# Patient Record
Sex: FEMALE | Race: BLACK OR AFRICAN AMERICAN | NOT HISPANIC OR LATINO | Employment: UNEMPLOYED | URBAN - METROPOLITAN AREA
[De-identification: names, ages, dates, MRNs, and addresses within clinical notes are randomized per-mention and may not be internally consistent; named-entity substitution may affect disease eponyms.]

---

## 2017-04-27 RX ORDER — NORETHINDRONE ACETATE AND ETHINYL ESTRADIOL 1MG-20(21)
KIT ORAL
Qty: 28 TABLET | Refills: 0 | Status: SHIPPED | OUTPATIENT
Start: 2017-04-27 | End: 2022-03-23

## 2017-09-09 ENCOUNTER — OFFICE VISIT (OUTPATIENT)
Dept: URGENT CARE | Facility: CLINIC | Age: 27
End: 2017-09-09
Payer: MEDICAID

## 2017-09-09 VITALS
TEMPERATURE: 97 F | HEIGHT: 65 IN | HEART RATE: 69 BPM | BODY MASS INDEX: 43.15 KG/M2 | WEIGHT: 259 LBS | SYSTOLIC BLOOD PRESSURE: 142 MMHG | RESPIRATION RATE: 18 BRPM | OXYGEN SATURATION: 98 % | DIASTOLIC BLOOD PRESSURE: 88 MMHG

## 2017-09-09 DIAGNOSIS — J30.9 ACUTE ALLERGIC RHINITIS, UNSPECIFIED SEASONALITY, UNSPECIFIED TRIGGER: Primary | ICD-10-CM

## 2017-09-09 PROCEDURE — 3008F BODY MASS INDEX DOCD: CPT | Mod: S$GLB,,, | Performed by: FAMILY MEDICINE

## 2017-09-09 PROCEDURE — 99203 OFFICE O/P NEW LOW 30 MIN: CPT | Mod: S$GLB,,, | Performed by: FAMILY MEDICINE

## 2017-09-09 RX ORDER — FLUTICASONE PROPIONATE 50 MCG
1 SPRAY, SUSPENSION (ML) NASAL DAILY
Qty: 1 BOTTLE | Refills: 2 | Status: SHIPPED | OUTPATIENT
Start: 2017-09-09 | End: 2018-09-09

## 2017-09-09 RX ORDER — LORATADINE 10 MG/1
10 TABLET ORAL DAILY
Refills: 0 | COMMUNITY
Start: 2017-09-09 | End: 2022-03-23

## 2017-09-09 NOTE — PROGRESS NOTES
"Subjective:       Patient ID: Pinky Kate is a 27 y.o. female.    Vitals:  height is 5' 5" (1.651 m) and weight is 117.5 kg (259 lb). Her oral temperature is 97.4 °F (36.3 °C). Her blood pressure is 142/88 (abnormal) and her pulse is 69. Her respiration is 18 and oxygen saturation is 98%.     Chief Complaint: Sinusitis    Sinusitis   This is a new problem. The current episode started more than 1 month ago. The problem has been gradually worsening since onset. There has been no fever. Her pain is at a severity of 4/10. The pain is mild. Associated symptoms include congestion, ear pain, headaches and a hoarse voice. Pertinent negatives include no chills, coughing, shortness of breath or sore throat. Treatments tried: ibuprofen  The treatment provided mild relief.     Review of Systems   Constitution: Negative for chills, fever and malaise/fatigue.   HENT: Positive for congestion, ear pain and hoarse voice. Negative for sore throat.    Eyes: Positive for blurred vision. Negative for discharge and redness.   Cardiovascular: Negative for chest pain, dyspnea on exertion and leg swelling.   Respiratory: Negative for cough, shortness of breath, sputum production and wheezing.    Musculoskeletal: Negative for myalgias.   Gastrointestinal: Positive for nausea. Negative for abdominal pain.   Neurological: Positive for headaches.       Objective:      Physical Exam   Constitutional: She is oriented to person, place, and time. She appears well-developed and well-nourished.   HENT:   Head: Normocephalic and atraumatic.   Nose: Mucosal edema, rhinorrhea and sinus tenderness (minimal over right ethomidal) present.   Mouth/Throat: Posterior oropharyngeal edema and posterior oropharyngeal erythema present.   Eyes: Conjunctivae and EOM are normal. Pupils are equal, round, and reactive to light.   Neck: Normal range of motion.   Cardiovascular: Normal rate, regular rhythm and normal heart sounds.    Pulmonary/Chest: Effort normal and " breath sounds normal. She has no wheezes. She has no rales.   Neurological: She is alert and oriented to person, place, and time.       Assessment:       1. Acute allergic rhinitis, unspecified seasonality, unspecified trigger        Plan:         Acute allergic rhinitis, unspecified seasonality, unspecified trigger  -     loratadine (CLARITIN) 10 mg tablet; Take 1 tablet (10 mg total) by mouth once daily.; Refill: 0  -     fluticasone (FLONASE) 50 mcg/actuation nasal spray; 1 spray by Each Nare route once daily.  Dispense: 1 Bottle; Refill: 2

## 2017-09-09 NOTE — PATIENT INSTRUCTIONS

## 2017-12-03 ENCOUNTER — OFFICE VISIT (OUTPATIENT)
Dept: URGENT CARE | Facility: CLINIC | Age: 27
End: 2017-12-03
Payer: MEDICAID

## 2017-12-03 VITALS
WEIGHT: 259 LBS | HEART RATE: 94 BPM | TEMPERATURE: 98 F | BODY MASS INDEX: 43.15 KG/M2 | OXYGEN SATURATION: 98 % | HEIGHT: 65 IN | DIASTOLIC BLOOD PRESSURE: 93 MMHG | SYSTOLIC BLOOD PRESSURE: 139 MMHG

## 2017-12-03 DIAGNOSIS — J06.9 VIRAL UPPER RESPIRATORY TRACT INFECTION: Primary | ICD-10-CM

## 2017-12-03 DIAGNOSIS — R06.02 SHORTNESS OF BREATH: ICD-10-CM

## 2017-12-03 DIAGNOSIS — R52 BODY ACHES: ICD-10-CM

## 2017-12-03 LAB
CTP QC/QA: YES
FLUAV AG NPH QL: NEGATIVE
FLUBV AG NPH QL: NEGATIVE

## 2017-12-03 PROCEDURE — 99214 OFFICE O/P EST MOD 30 MIN: CPT | Mod: 25,S$GLB,, | Performed by: NURSE PRACTITIONER

## 2017-12-03 PROCEDURE — 94640 AIRWAY INHALATION TREATMENT: CPT | Mod: S$GLB,,, | Performed by: EMERGENCY MEDICINE

## 2017-12-03 PROCEDURE — 87804 INFLUENZA ASSAY W/OPTIC: CPT | Mod: 59,QW,S$GLB, | Performed by: NURSE PRACTITIONER

## 2017-12-03 RX ORDER — PROMETHAZINE HYDROCHLORIDE AND DEXTROMETHORPHAN HYDROBROMIDE 6.25; 15 MG/5ML; MG/5ML
5 SYRUP ORAL
Qty: 118 ML | Refills: 0 | Status: SHIPPED | OUTPATIENT
Start: 2017-12-03 | End: 2022-03-23

## 2017-12-03 RX ORDER — ALBUTEROL SULFATE 0.83 MG/ML
2.5 SOLUTION RESPIRATORY (INHALATION)
Status: COMPLETED | OUTPATIENT
Start: 2017-12-03 | End: 2017-12-03

## 2017-12-03 RX ORDER — ALBUTEROL SULFATE 90 UG/1
2 AEROSOL, METERED RESPIRATORY (INHALATION) EVERY 6 HOURS PRN
Qty: 18 G | Refills: 0 | Status: SHIPPED | OUTPATIENT
Start: 2017-12-03 | End: 2022-03-23

## 2017-12-03 RX ORDER — DEXAMETHASONE SODIUM PHOSPHATE 100 MG/10ML
10 INJECTION INTRAMUSCULAR; INTRAVENOUS
Status: COMPLETED | OUTPATIENT
Start: 2017-12-03 | End: 2017-12-03

## 2017-12-03 RX ADMIN — ALBUTEROL SULFATE 2.5 MG: 0.83 SOLUTION RESPIRATORY (INHALATION) at 01:12

## 2017-12-03 RX ADMIN — DEXAMETHASONE SODIUM PHOSPHATE 10 MG: 100 INJECTION INTRAMUSCULAR; INTRAVENOUS at 01:12

## 2017-12-03 NOTE — PROGRESS NOTES
"Subjective:       Patient ID: Pinky Kate is a 27 y.o. female.    Vitals:  height is 5' 5" (1.651 m) and weight is 117.5 kg (259 lb). Her oral temperature is 98.3 °F (36.8 °C). Her blood pressure is 139/93 (abnormal) and her pulse is 94. Her oxygen saturation is 98%.     Chief Complaint: Generalized Body Aches and Fever    Symptoms since friday      Fever    This is a new problem. The current episode started in the past 7 days. The problem occurs constantly. The problem has been unchanged. Her temperature was unmeasured prior to arrival. Associated symptoms include congestion, coughing, diarrhea, nausea, a sore throat and vomiting. Pertinent negatives include no abdominal pain, chest pain, ear pain, headaches or wheezing.     Review of Systems   Constitution: Positive for chills, fever and malaise/fatigue.   HENT: Positive for congestion and sore throat. Negative for ear pain and hoarse voice.    Eyes: Negative for discharge and redness.   Cardiovascular: Negative for chest pain, dyspnea on exertion and leg swelling.   Respiratory: Positive for cough and sputum production. Negative for shortness of breath and wheezing.    Musculoskeletal: Positive for myalgias.   Gastrointestinal: Positive for diarrhea, nausea and vomiting. Negative for abdominal pain.   Neurological: Negative for headaches.       Objective:      Physical Exam   Constitutional: She is oriented to person, place, and time. She appears well-developed and well-nourished. She is cooperative.  Non-toxic appearance. She does not appear ill. No distress.   HENT:   Head: Normocephalic and atraumatic.   Right Ear: Hearing, tympanic membrane, external ear and ear canal normal.   Left Ear: Hearing, tympanic membrane, external ear and ear canal normal.   Nose: Nose normal. No mucosal edema, rhinorrhea or nasal deformity. No epistaxis. Right sinus exhibits no maxillary sinus tenderness and no frontal sinus tenderness. Left sinus exhibits no maxillary sinus " tenderness and no frontal sinus tenderness.   Mouth/Throat: Uvula is midline, oropharynx is clear and moist and mucous membranes are normal. No trismus in the jaw. Normal dentition. No uvula swelling. No posterior oropharyngeal erythema.   Eyes: Conjunctivae and lids are normal. No scleral icterus.   Sclera clear bilat   Neck: Trachea normal, full passive range of motion without pain and phonation normal. Neck supple.   Cardiovascular: Normal rate, regular rhythm, normal heart sounds, intact distal pulses and normal pulses.    Pulmonary/Chest: Effort normal and breath sounds normal. No respiratory distress.   Abdominal: Soft. Normal appearance and bowel sounds are normal. She exhibits no distension. There is no tenderness.   Musculoskeletal: Normal range of motion. She exhibits no edema or deformity.   Neurological: She is alert and oriented to person, place, and time. She exhibits normal muscle tone. Coordination normal.   Skin: Skin is warm, dry and intact. She is not diaphoretic. No pallor.   Psychiatric: She has a normal mood and affect. Her speech is normal and behavior is normal. Judgment and thought content normal. Cognition and memory are normal.   Nursing note and vitals reviewed.      Assessment:       1. Viral upper respiratory tract infection    2. Body aches    3. Shortness of breath        Plan:         Viral upper respiratory tract infection  -     albuterol nebulizer solution 2.5 mg; Take 3 mLs (2.5 mg total) by nebulization one time.  -     promethazine-dextromethorphan (PROMETHAZINE-DM) 6.25-15 mg/5 mL Syrp; Take 5 mLs by mouth every 4 to 6 hours as needed.  Dispense: 118 mL; Refill: 0  -     albuterol 90 mcg/actuation inhaler; Inhale 2 puffs into the lungs every 6 (six) hours as needed for Wheezing or Shortness of Breath. Rescue  Dispense: 18 g; Refill: 0  -     dexamethasone injection 10 mg; Inject 1 mL (10 mg total) into the muscle one time.    Body aches  -     POCT Influenza A/B    Shortness  of breath      Patient Instructions   Please drink plenty of fluids.  Please get plenty of rest.  Please return here or go to the Emergency Department for any concerns or worsening of condition.  If you were prescribed antibiotics, please take them to completion.  If you were given wait & see antibiotics, please wait 5-7 days before taking them, and only take them if your symptoms have worsened or not improved.  If you do begin taking the antibiotics, please take them to completion.  If you were prescribed a narcotic medication, do not drive or operate heavy equipment or machinery while taking these medications.  If you were given a steroid shot in the clinic and have also been given a prescription for a steroid such as Prednisone or a Medrol Dose Pack, please begin taking them tomorrow.  If you do not have Hypertension or any history of palpitations, it is ok to take over the counter Sudafed or Mucinex D or Allegra-D or Claritin-D or Zyrtec-D.  If you do take one of the above, it is ok to combine that with plain over the counter Mucinex or Allegra or Claritin or Zyrtec.  If for example you are taking Zyrtec -D, you can combine that with Mucinex, but not Mucinex-D.  If you are taking Mucinex-D, you can combine that with plain Allegra or Claritin or Zyrtec.   If you do have Hypertension or palpitations, it is safe to take Coricidin HBP for relief of sinus symptoms.  If not allergic, please take over the counter Tylenol (Acetaminophen) and/or Motrin (Ibuprofen) as directed for control of pain and/or fever.  Please follow up with your primary care doctor or specialist as needed.    If you  smoke, please stop smoking.  Viral Upper Respiratory Illness (Adult)  You have a viral upper respiratory illness (URI), which is another term for the common cold. This illness is contagious during the first few days. It is spread through the air by coughing and sneezing. It may also be spread by direct contact (touching the sick  person and then touching your own eyes, nose, or mouth). Frequent handwashing will decrease risk of spread. Most viral illnesses go away within 7 to 10 days with rest and simple home remedies. Sometimes the illness may last for several weeks. Antibiotics will not kill a virus, and they are generally not prescribed for this condition.    Home care  · If symptoms are severe, rest at home for the first 2 to 3 days. When you resume activity, don't let yourself get too tired.  · Avoid being exposed to cigarette smoke (yours or others).  · You may use acetaminophen or ibuprofen to control pain and fever, unless another medicine was prescribed. (Note: If you have chronic liver or kidney disease, have ever had a stomach ulcer or gastrointestinal bleeding, or are taking blood-thinning medicines, talk with your healthcare provider before using these medicines.) Aspirin should never be given to anyone under 18 years of age who is ill with a viral infection or fever. It may cause severe liver or brain damage.  · Your appetite may be poor, so a light diet is fine. Avoid dehydration by drinking 6 to 8 glasses of fluids per day (water, soft drinks, juices, tea, or soup). Extra fluids will help loosen secretions in the nose and lungs.  · Over-the-counter cold medicines will not shorten the length of time youre sick, but they may be helpful for the following symptoms: cough, sore throat, and nasal and sinus congestion. (Note: Do not use decongestants if you have high blood pressure.)  Follow-up care  Follow up with your healthcare provider, or as advised.  When to seek medical advice  Call your healthcare provider right away if any of these occur:  · Cough with lots of colored sputum (mucus)  · Severe headache; face, neck, or ear pain  · Difficulty swallowing due to throat pain  · Fever of 100.4°F (38°C)  Call 911, or get immediate medical care  Call emergency services right away if any of these occur:  · Chest pain, shortness of  breath, wheezing, or difficulty breathing  · Coughing up blood  · Inability to swallow due to throat pain  Date Last Reviewed: 9/13/2015  © 2258-1716 [employer], [employer]. [address] [place] [address], [place] [address]. All rights reserved. This information is not intended as a substitute for professional medical care. Always follow your healthcare professional's instructions.

## 2017-12-03 NOTE — PATIENT INSTRUCTIONS
Please drink plenty of fluids.  Please get plenty of rest.  Please return here or go to the Emergency Department for any concerns or worsening of condition.  If you were prescribed antibiotics, please take them to completion.  If you were given wait & see antibiotics, please wait 5-7 days before taking them, and only take them if your symptoms have worsened or not improved.  If you do begin taking the antibiotics, please take them to completion.  If you were prescribed a narcotic medication, do not drive or operate heavy equipment or machinery while taking these medications.  If you were given a steroid shot in the clinic and have also been given a prescription for a steroid such as Prednisone or a Medrol Dose Pack, please begin taking them tomorrow.  If you do not have Hypertension or any history of palpitations, it is ok to take over the counter Sudafed or Mucinex D or Allegra-D or Claritin-D or Zyrtec-D.  If you do take one of the above, it is ok to combine that with plain over the counter Mucinex or Allegra or Claritin or Zyrtec.  If for example you are taking Zyrtec -D, you can combine that with Mucinex, but not Mucinex-D.  If you are taking Mucinex-D, you can combine that with plain Allegra or Claritin or Zyrtec.   If you do have Hypertension or palpitations, it is safe to take Coricidin HBP for relief of sinus symptoms.  If not allergic, please take over the counter Tylenol (Acetaminophen) and/or Motrin (Ibuprofen) as directed for control of pain and/or fever.  Please follow up with your primary care doctor or specialist as needed.    If you  smoke, please stop smoking.  Viral Upper Respiratory Illness (Adult)  You have a viral upper respiratory illness (URI), which is another term for the common cold. This illness is contagious during the first few days. It is spread through the air by coughing and sneezing. It may also be spread by direct contact (touching the sick person and then touching your own eyes,  nose, or mouth). Frequent handwashing will decrease risk of spread. Most viral illnesses go away within 7 to 10 days with rest and simple home remedies. Sometimes the illness may last for several weeks. Antibiotics will not kill a virus, and they are generally not prescribed for this condition.    Home care  · If symptoms are severe, rest at home for the first 2 to 3 days. When you resume activity, don't let yourself get too tired.  · Avoid being exposed to cigarette smoke (yours or others).  · You may use acetaminophen or ibuprofen to control pain and fever, unless another medicine was prescribed. (Note: If you have chronic liver or kidney disease, have ever had a stomach ulcer or gastrointestinal bleeding, or are taking blood-thinning medicines, talk with your healthcare provider before using these medicines.) Aspirin should never be given to anyone under 18 years of age who is ill with a viral infection or fever. It may cause severe liver or brain damage.  · Your appetite may be poor, so a light diet is fine. Avoid dehydration by drinking 6 to 8 glasses of fluids per day (water, soft drinks, juices, tea, or soup). Extra fluids will help loosen secretions in the nose and lungs.  · Over-the-counter cold medicines will not shorten the length of time youre sick, but they may be helpful for the following symptoms: cough, sore throat, and nasal and sinus congestion. (Note: Do not use decongestants if you have high blood pressure.)  Follow-up care  Follow up with your healthcare provider, or as advised.  When to seek medical advice  Call your healthcare provider right away if any of these occur:  · Cough with lots of colored sputum (mucus)  · Severe headache; face, neck, or ear pain  · Difficulty swallowing due to throat pain  · Fever of 100.4°F (38°C)  Call 911, or get immediate medical care  Call emergency services right away if any of these occur:  · Chest pain, shortness of breath, wheezing, or difficulty  breathing  · Coughing up blood  · Inability to swallow due to throat pain  Date Last Reviewed: 9/13/2015  © 4359-9112 [employer], [employer]. [address] [place] [address], [place] [address]. All rights reserved. This information is not intended as a substitute for professional medical care. Always follow your healthcare professional's instructions.

## 2018-12-04 ENCOUNTER — OFFICE VISIT (OUTPATIENT)
Dept: URGENT CARE | Facility: CLINIC | Age: 28
End: 2018-12-04
Payer: MEDICAID

## 2018-12-04 VITALS
RESPIRATION RATE: 18 BRPM | DIASTOLIC BLOOD PRESSURE: 89 MMHG | HEIGHT: 65 IN | OXYGEN SATURATION: 98 % | SYSTOLIC BLOOD PRESSURE: 138 MMHG | TEMPERATURE: 99 F | WEIGHT: 256 LBS | BODY MASS INDEX: 42.65 KG/M2 | HEART RATE: 78 BPM

## 2018-12-04 DIAGNOSIS — F41.9 ANXIETY: Primary | ICD-10-CM

## 2018-12-04 PROCEDURE — 99213 OFFICE O/P EST LOW 20 MIN: CPT | Mod: S$GLB,,, | Performed by: NURSE PRACTITIONER

## 2018-12-04 RX ORDER — LORAZEPAM 0.5 MG/1
0.5 TABLET ORAL EVERY 6 HOURS PRN
Qty: 15 TABLET | Refills: 0 | Status: SHIPPED | OUTPATIENT
Start: 2018-12-04 | End: 2022-03-23

## 2018-12-04 NOTE — LETTER
December 4, 2018      [facility] [address] [facility], [place] [address]  Phone: [phone]  Fax: [phone]       Patient: Pinky Kate   YOB: 1990  Date of Visit: 12/04/2018    To Whom It May Concern:    Camryn Kate  was at [facility] on 12/04/2018. She may return to work/school on 12/05/2018 with no restrictions. If you have any questions or concerns, or if I can be of further assistance, please do not hesitate to contact me.    Sincerely,      Shilpa Curtis NP

## 2018-12-05 NOTE — PROGRESS NOTES
"Subjective:       Patient ID: Pinky Kate is a 28 y.o. female.    Vitals:  height is 5' 5" (1.651 m) and weight is 116.1 kg (256 lb). Her temperature is 98.8 °F (37.1 °C). Her blood pressure is 138/89 and her pulse is 78. Her respiration is 18 and oxygen saturation is 98%.     Chief Complaint: Chest Pain    Presented to  with c/o chest pain and dizziness. Patient states symptoms occur when she feels overwhelmed and stressed. She recently  from  and has had major life changes. Denies cp or dizziness at present.       Chest Pain    This is a new problem. Episode onset: 3 days. The onset quality is sudden. The problem occurs constantly. The problem has been gradually worsening. The pain is present in the substernal region. The pain is mild. The quality of the pain is described as crushing and pressure. The pain does not radiate. Associated symptoms include nausea, palpitations, syncope and vomiting. Pertinent negatives include no abdominal pain, back pain, fever or shortness of breath. The pain is aggravated by nothing. She has tried nothing for the symptoms. The treatment provided no relief. Risk factors include obesity.       Constitution: Negative for chills, fatigue, fever and international travel in last 60 days.   HENT: Negative for trouble swallowing.    Neck: Negative for neck pain.   Cardiovascular: Positive for chest pain, palpitations and passing out. Negative for chest trauma and leg swelling.   Respiratory: Negative for sleep apnea and shortness of breath.    Gastrointestinal: Positive for nausea and vomiting. Negative for abdominal pain and heartburn.   Genitourinary: Negative for history of kidney stones.   Musculoskeletal: Negative for back pain.   Skin: Negative for rash.   Neurological: Negative for light-headedness and passing out.   Hematologic/Lymphatic: Negative for history of blood clots.   Psychiatric/Behavioral: Negative for nervous/anxious. The patient is not " nervous/anxious.        Objective:      Physical Exam   Constitutional: She is oriented to person, place, and time. She appears well-developed and well-nourished. She is cooperative.  Non-toxic appearance. She does not appear ill. No distress.   HENT:   Head: Normocephalic and atraumatic.   Right Ear: Hearing, tympanic membrane, external ear and ear canal normal.   Left Ear: Hearing, tympanic membrane, external ear and ear canal normal.   Nose: Nose normal. No mucosal edema, rhinorrhea or nasal deformity. No epistaxis. Right sinus exhibits no maxillary sinus tenderness and no frontal sinus tenderness. Left sinus exhibits no maxillary sinus tenderness and no frontal sinus tenderness.   Mouth/Throat: Uvula is midline, oropharynx is clear and moist and mucous membranes are normal. No trismus in the jaw. Normal dentition. No uvula swelling. No posterior oropharyngeal erythema.   Eyes: Conjunctivae and lids are normal. Right eye exhibits no discharge. Left eye exhibits no discharge. No scleral icterus.   Sclera clear bilat   Neck: Trachea normal, normal range of motion, full passive range of motion without pain and phonation normal. Neck supple.   Cardiovascular: Normal rate, regular rhythm, normal heart sounds, intact distal pulses and normal pulses.   Pulmonary/Chest: Effort normal and breath sounds normal. No respiratory distress.   Abdominal: Soft. Normal appearance and bowel sounds are normal. She exhibits no distension, no pulsatile midline mass and no mass. There is no tenderness.   Musculoskeletal: Normal range of motion. She exhibits no edema or deformity.   Neurological: She is alert and oriented to person, place, and time. She exhibits normal muscle tone. Coordination normal.   Skin: Skin is warm, dry and intact. She is not diaphoretic. No pallor.   Psychiatric: She has a normal mood and affect. Her speech is normal and behavior is normal. Judgment and thought content normal. Cognition and memory are normal.    Nursing note and vitals reviewed.      Assessment:       1. Anxiety        Plan:       EKG- NSR  Anxiety  -     LORazepam (ATIVAN) 0.5 MG tablet; Take 1 tablet (0.5 mg total) by mouth every 6 (six) hours as needed for Anxiety.  Dispense: 15 tablet; Refill: 0  -     Ambulatory referral to Internal Medicine    Relaxation techniques discussed  Please follow up with your Primary care provider within 2-5 days if your signs and symptoms have not resolved or worsen.     If your condition worsens or fails to improve we recommend that you receive another evaluation at the emergency room immediately or contact your primary medical clinic to discuss your concerns.   You must understand that you have received an Urgent Care treatment only and that you may be released before all of your medical problems are known or treated. You, the patient, will arrange for follow up care as instructed.     RED FLAGS/WARNING SYMPTOMS DISCUSSED WITH PATIENT THAT WOULD WARRANT EMERGENT MEDICAL ATTENTION. PATIENT VERBALIZED UNDERSTANDING.     Understanding Anxiety Disorders  Almost everyone gets nervous now and then. Its normal to have knots in your stomach before a test, or for your heart to race on a first date. But an anxiety disorder is much more than a case of nerves. In fact, its symptoms may be overwhelming. But treatment can relieve many of these symptoms. Talking to your healthcare provider is the first step.    What are anxiety disorders?  An anxiety disorder causes intense feelings of panic and fear. These feelings may arise for no apparent reason. And they tend to recur again and again. They may prevent you from coping with life and cause you great distress. As a result, you may avoid anything that triggers your fear. In extreme cases, you may never leave the house. Anxiety disorders may cause other symptoms, such as:  · Obsessive thoughts you cant control  · Constant nightmares or painful thoughts of the past  · Nausea, sweating,  and muscle tension  · Trouble sleeping or concentrating  What causes anxiety disorders?  Anxiety disorders tend to run in families. For some people, childhood abuse or neglect may play a role. For others, stressful life events or trauma may trigger anxiety disorders. Anxiety can trigger low self-esteem and poor coping skills.  Common anxiety disorders  · Panic disorder. This causes an intense fear of being in danger.  · Phobias. These are extreme fears of certain objects, places, or events.  · Obsessive-compulsive disorder. This causes you to have unwanted thoughts and urges. You also may perform certain actions over and over.  · Posttraumatic stress disorder. This occurs in people who have survived a terrible ordeal. It can cause nightmares and flashbacks about the event.  · Generalized anxiety disorder. This causes constant worry that can greatly disrupt your life.   Getting better  You may believe that nothing can help you. Or, you might fear what others may think. But most anxiety symptoms can be eased. Having an anxiety disorder is nothing to be ashamed of. Most people do best with treatment that combines medicine and therapy. These arent cures. But they can help you live a healthier life.  Date Last Reviewed: 2/1/2017  © 5091-0681 [employer]. [address] [place] [address], [place] [address]. All rights reserved. This information is not intended as a substitute for professional medical care. Always follow your healthcare professional's instructions.

## 2018-12-05 NOTE — PATIENT INSTRUCTIONS
Please follow up with your Primary care provider within 2-5 days if your signs and symptoms have not resolved or worsen.     If your condition worsens or fails to improve we recommend that you receive another evaluation at the emergency room immediately or contact your primary medical clinic to discuss your concerns.   You must understand that you have received an Urgent Care treatment only and that you may be released before all of your medical problems are known or treated. You, the patient, will arrange for follow up care as instructed.     RED FLAGS/WARNING SYMPTOMS DISCUSSED WITH PATIENT THAT WOULD WARRANT EMERGENT MEDICAL ATTENTION. PATIENT VERBALIZED UNDERSTANDING.     Understanding Anxiety Disorders  Almost everyone gets nervous now and then. Its normal to have knots in your stomach before a test, or for your heart to race on a first date. But an anxiety disorder is much more than a case of nerves. In fact, its symptoms may be overwhelming. But treatment can relieve many of these symptoms. Talking to your healthcare provider is the first step.    What are anxiety disorders?  An anxiety disorder causes intense feelings of panic and fear. These feelings may arise for no apparent reason. And they tend to recur again and again. They may prevent you from coping with life and cause you great distress. As a result, you may avoid anything that triggers your fear. In extreme cases, you may never leave the house. Anxiety disorders may cause other symptoms, such as:  · Obsessive thoughts you cant control  · Constant nightmares or painful thoughts of the past  · Nausea, sweating, and muscle tension  · Trouble sleeping or concentrating  What causes anxiety disorders?  Anxiety disorders tend to run in families. For some people, childhood abuse or neglect may play a role. For others, stressful life events or trauma may trigger anxiety disorders. Anxiety can trigger low self-esteem and poor coping skills.  Common anxiety  disorders  · Panic disorder. This causes an intense fear of being in danger.  · Phobias. These are extreme fears of certain objects, places, or events.  · Obsessive-compulsive disorder. This causes you to have unwanted thoughts and urges. You also may perform certain actions over and over.  · Posttraumatic stress disorder. This occurs in people who have survived a terrible ordeal. It can cause nightmares and flashbacks about the event.  · Generalized anxiety disorder. This causes constant worry that can greatly disrupt your life.   Getting better  You may believe that nothing can help you. Or, you might fear what others may think. But most anxiety symptoms can be eased. Having an anxiety disorder is nothing to be ashamed of. Most people do best with treatment that combines medicine and therapy. These arent cures. But they can help you live a healthier life.  Date Last Reviewed: 2/1/2017  © 5573-6167 [facility] [employer]. [address] [place] [address], [place] [address]. All rights reserved. This information is not intended as a substitute for professional medical care. Always follow your healthcare professional's instructions.

## 2019-01-16 ENCOUNTER — OFFICE VISIT (OUTPATIENT)
Dept: URGENT CARE | Facility: CLINIC | Age: 29
End: 2019-01-16
Payer: MEDICAID

## 2019-01-16 VITALS
OXYGEN SATURATION: 98 % | WEIGHT: 256 LBS | SYSTOLIC BLOOD PRESSURE: 130 MMHG | TEMPERATURE: 99 F | BODY MASS INDEX: 42.65 KG/M2 | RESPIRATION RATE: 18 BRPM | DIASTOLIC BLOOD PRESSURE: 80 MMHG | HEIGHT: 65 IN | HEART RATE: 62 BPM

## 2019-01-16 DIAGNOSIS — J02.9 ACUTE PHARYNGITIS, UNSPECIFIED ETIOLOGY: ICD-10-CM

## 2019-01-16 DIAGNOSIS — J01.00 ACUTE NON-RECURRENT MAXILLARY SINUSITIS: Primary | ICD-10-CM

## 2019-01-16 PROCEDURE — 99214 PR OFFICE/OUTPT VISIT, EST, LEVL IV, 30-39 MIN: ICD-10-PCS | Mod: S$GLB,,, | Performed by: SURGERY

## 2019-01-16 PROCEDURE — 99214 OFFICE O/P EST MOD 30 MIN: CPT | Mod: S$GLB,,, | Performed by: SURGERY

## 2019-01-16 RX ORDER — FLUTICASONE PROPIONATE 50 MCG
2 SPRAY, SUSPENSION (ML) NASAL DAILY
Qty: 1 BOTTLE | Refills: 0 | Status: SHIPPED | OUTPATIENT
Start: 2019-01-16 | End: 2019-02-15

## 2019-01-16 RX ORDER — IBUPROFEN 800 MG/1
800 TABLET ORAL EVERY 8 HOURS PRN
Qty: 60 TABLET | Refills: 0 | Status: SHIPPED | OUTPATIENT
Start: 2019-01-16 | End: 2020-01-16

## 2019-01-16 RX ORDER — AZITHROMYCIN 500 MG/1
500 TABLET, FILM COATED ORAL DAILY
Qty: 5 TABLET | Refills: 0 | Status: SHIPPED | OUTPATIENT
Start: 2019-01-16 | End: 2019-01-21

## 2019-01-16 NOTE — PROGRESS NOTES
"Subjective:       Patient ID: Pinky Kate is a 28 y.o. female.    Vitals:  height is 5' 5" (1.651 m) and weight is 116.1 kg (256 lb). Her temperature is 98.6 °F (37 °C). Her blood pressure is 130/80 and her pulse is 62. Her respiration is 18 and oxygen saturation is 98%.     Chief Complaint: URI    Patient has been having symptoms of an upper respiratory infection for over week.  She has been taking Mucinex multi symptom with some mucus production but progression of symptoms with face pain severe sore throat difficulty swallowing.      URI    This is a new problem. Episode onset: 2 weeks. The problem has been unchanged. There has been no fever. Associated symptoms include congestion, coughing, rhinorrhea, sinus pain and a sore throat. Pertinent negatives include no ear pain, nausea, rash, vomiting or wheezing. She has tried nothing for the symptoms. The treatment provided no relief.       Constitution: Negative for chills, sweating, fatigue and fever.   HENT: Positive for congestion, postnasal drip, sinus pain, sinus pressure and sore throat. Negative for ear pain and voice change.    Neck: Negative for painful lymph nodes.   Eyes: Negative for eye redness.   Respiratory: Positive for cough and sputum production. Negative for bloody sputum, COPD, shortness of breath, stridor, wheezing and asthma.    Gastrointestinal: Negative for nausea and vomiting.   Musculoskeletal: Negative for muscle ache.   Skin: Negative for rash.   Allergic/Immunologic: Negative for seasonal allergies and asthma.   Hematologic/Lymphatic: Negative for swollen lymph nodes.       Objective:      Physical Exam   Constitutional: She is oriented to person, place, and time. She appears well-developed and well-nourished. She is cooperative.  Non-toxic appearance. She does not appear ill. No distress.   HENT:   Head: Normocephalic and atraumatic.   Right Ear: Hearing, tympanic membrane, external ear and ear canal normal.   Left Ear: Hearing, " tympanic membrane, external ear and ear canal normal.   Nose: Mucosal edema and rhinorrhea present. No nasal deformity. No epistaxis. Right sinus exhibits maxillary sinus tenderness. Right sinus exhibits no frontal sinus tenderness. Left sinus exhibits maxillary sinus tenderness. Left sinus exhibits no frontal sinus tenderness.   Mouth/Throat: Uvula is midline and mucous membranes are normal. No trismus in the jaw. Normal dentition. No uvula swelling. Oropharyngeal exudate, posterior oropharyngeal edema and posterior oropharyngeal erythema present. Tonsils are 0 on the right. Tonsils are 0 on the left.   Purulent postnasal drip   Eyes: Conjunctivae and lids are normal. No scleral icterus.   Sclera clear bilat   Neck: Trachea normal, full passive range of motion without pain and phonation normal. Neck supple.   Cardiovascular: Normal rate, regular rhythm, normal heart sounds, intact distal pulses and normal pulses.   Pulmonary/Chest: Effort normal and breath sounds normal. No respiratory distress.   Abdominal: Soft. Normal appearance and bowel sounds are normal. She exhibits no distension. There is no tenderness.   Musculoskeletal: Normal range of motion. She exhibits no edema or deformity.   Neurological: She is alert and oriented to person, place, and time. She exhibits normal muscle tone. Coordination normal.   Skin: Skin is warm, dry and intact. She is not diaphoretic. No pallor.   Psychiatric: She has a normal mood and affect. Her speech is normal and behavior is normal. Judgment and thought content normal. Cognition and memory are normal.   Nursing note and vitals reviewed.      Assessment:       1. Acute non-recurrent maxillary sinusitis    2. Acute pharyngitis, unspecified etiology        Plan:         Acute non-recurrent maxillary sinusitis  -     ibuprofen (ADVIL,MOTRIN) 800 MG tablet; Take 1 tablet (800 mg total) by mouth every 8 (eight) hours as needed for Pain.  Dispense: 60 tablet; Refill: 0  -      fluticasone (FLONASE) 50 mcg/actuation nasal spray; 2 sprays (100 mcg total) by Each Nare route once daily.  Dispense: 1 Bottle; Refill: 0    Acute pharyngitis, unspecified etiology    Other orders  -     azithromycin (ZITHROMAX) 500 MG tablet; Take 1 tablet (500 mg total) by mouth once daily. for 5 days  Dispense: 5 tablet; Refill: 0      Patient Instructions       Self-Care for Sore Throats    Sore throats happen for many reasons, such as colds, allergies, and infections caused by viruses or bacteria. In any case, your throat becomes red and sore. Your goal for self-care is to reduce your discomfort while giving your throat a chance to heal.  Moisten and soothe your throat  Tips include the following:  · Try a sip of water first thing after waking up.  · Keep your throat moist by drinking 6 or more glasses of clear liquids every day.  · Run a cool-air humidifier in your room overnight.  · Avoid cigarette smoke.   · Suck on throat lozenges, cough drops, hard candy, ice chips, or frozen fruit-juice bars. Use the sugar-free versions if your diet or medical condition requires them.  Gargle to ease irritation  Gargling every hour or 2 can ease irritation. Try gargling with 1 of these solutions:  · 1/4 teaspoon of salt in 1/2 cup of warm water  · An over-the-counter anesthetic gargle  Use medicine for more relief  Over-the-counter medicine can reduce sore throat symptoms. Ask your pharmacist if you have questions about which medicine to use:  · Ease pain with anesthetic sprays. Aspirin or an aspirin substitute also helps. Remember, never give aspirin to anyone 18 or younger, or if you are already taking blood thinners.   · For sore throats caused by allergies, try antihistamines to block the allergic reaction.  · Remember: unless a sore throat is caused by a bacterial infection, antibiotics wont help you.  Prevent future sore throats  Prevention tips include the following:  · Stop smoking or reduce contact with  secondhand smoke. Smoke irritates the tender throat lining.  · Limit contact with pets and with allergy-causing substances, such as pollen and mold.  · When youre around someone with a sore throat or cold, wash your hands often to keep viruses or bacteria from spreading.  · Dont strain your vocal cords.  Call your healthcare provider  Contact your healthcare provider if you have:  · A temperature over 101°F (38.3°C)  · White spots on the throat  · Great difficulty swallowing  · Trouble breathing  · A skin rash  · Recent exposure to someone else with strep bacteria  · Severe hoarseness and swollen glands in the neck or jaw   Date Last Reviewed: 8/1/2016  © 5249-7087 [employer]. [address] [place] [address], [place] [address]. All rights reserved. This information is not intended as a substitute for professional medical care. Always follow your healthcare professional's instructions.

## 2019-01-16 NOTE — PATIENT INSTRUCTIONS

## 2021-02-11 ENCOUNTER — OFFICE VISIT (OUTPATIENT)
Dept: URGENT CARE | Facility: CLINIC | Age: 31
End: 2021-02-11
Payer: MEDICAID

## 2021-02-11 VITALS
BODY MASS INDEX: 42.6 KG/M2 | HEART RATE: 77 BPM | TEMPERATURE: 98 F | SYSTOLIC BLOOD PRESSURE: 143 MMHG | OXYGEN SATURATION: 100 % | WEIGHT: 256 LBS | DIASTOLIC BLOOD PRESSURE: 98 MMHG

## 2021-02-11 DIAGNOSIS — H66.001 NON-RECURRENT ACUTE SUPPURATIVE OTITIS MEDIA OF RIGHT EAR WITHOUT SPONTANEOUS RUPTURE OF TYMPANIC MEMBRANE: Primary | ICD-10-CM

## 2021-02-11 PROCEDURE — 99214 PR OFFICE/OUTPT VISIT, EST, LEVL IV, 30-39 MIN: ICD-10-PCS | Mod: S$GLB,,, | Performed by: PHYSICIAN ASSISTANT

## 2021-02-11 PROCEDURE — 99214 OFFICE O/P EST MOD 30 MIN: CPT | Mod: S$GLB,,, | Performed by: PHYSICIAN ASSISTANT

## 2021-02-11 RX ORDER — AZITHROMYCIN 250 MG/1
TABLET, FILM COATED ORAL
Qty: 6 TABLET | Refills: 0 | Status: SHIPPED | OUTPATIENT
Start: 2021-02-11 | End: 2022-03-23

## 2021-04-16 ENCOUNTER — PATIENT MESSAGE (OUTPATIENT)
Dept: RESEARCH | Facility: HOSPITAL | Age: 31
End: 2021-04-16

## 2021-05-14 ENCOUNTER — CLINICAL SUPPORT (OUTPATIENT)
Dept: URGENT CARE | Facility: CLINIC | Age: 31
End: 2021-05-14
Payer: MEDICAID

## 2021-05-14 DIAGNOSIS — Z20.822 EXPOSURE TO COVID-19 VIRUS: Primary | ICD-10-CM

## 2021-05-14 LAB
CTP QC/QA: YES
SARS-COV-2 RDRP RESP QL NAA+PROBE: NEGATIVE

## 2021-05-14 PROCEDURE — U0002: ICD-10-PCS | Mod: QW,S$GLB,, | Performed by: PHYSICIAN ASSISTANT

## 2021-05-14 PROCEDURE — U0002 COVID-19 LAB TEST NON-CDC: HCPCS | Mod: QW,S$GLB,, | Performed by: PHYSICIAN ASSISTANT

## 2021-11-12 ENCOUNTER — IMMUNIZATION (OUTPATIENT)
Dept: OBSTETRICS AND GYNECOLOGY | Facility: CLINIC | Age: 31
End: 2021-11-12
Payer: MEDICAID

## 2021-11-12 DIAGNOSIS — Z23 NEED FOR VACCINATION: Primary | ICD-10-CM

## 2021-11-12 PROCEDURE — 0001A COVID-19, MRNA, LNP-S, PF, 30 MCG/0.3 ML DOSE VACCINE: CPT | Mod: PBBFAC

## 2021-12-03 ENCOUNTER — IMMUNIZATION (OUTPATIENT)
Dept: OBSTETRICS AND GYNECOLOGY | Facility: CLINIC | Age: 31
End: 2021-12-03
Payer: MEDICAID

## 2021-12-03 DIAGNOSIS — Z23 NEED FOR VACCINATION: Primary | ICD-10-CM

## 2021-12-03 PROCEDURE — 0002A COVID-19, MRNA, LNP-S, PF, 30 MCG/0.3 ML DOSE VACCINE: CPT | Mod: PBBFAC

## 2021-12-03 PROCEDURE — 91300 COVID-19, MRNA, LNP-S, PF, 30 MCG/0.3 ML DOSE VACCINE: CPT | Mod: PBBFAC

## 2022-01-20 ENCOUNTER — OFFICE VISIT (OUTPATIENT)
Dept: OBSTETRICS AND GYNECOLOGY | Facility: CLINIC | Age: 32
End: 2022-01-20
Payer: MEDICAID

## 2022-01-20 VITALS
DIASTOLIC BLOOD PRESSURE: 90 MMHG | BODY MASS INDEX: 46.8 KG/M2 | HEIGHT: 65 IN | SYSTOLIC BLOOD PRESSURE: 136 MMHG | WEIGHT: 280.88 LBS

## 2022-01-20 DIAGNOSIS — N93.9 ABNORMAL UTERINE BLEEDING (AUB): Primary | ICD-10-CM

## 2022-01-20 DIAGNOSIS — I10 HYPERTENSION, UNSPECIFIED TYPE: ICD-10-CM

## 2022-01-20 LAB
B-HCG UR QL: NEGATIVE
CTP QC/QA: YES

## 2022-01-20 PROCEDURE — 3080F PR MOST RECENT DIASTOLIC BLOOD PRESSURE >= 90 MM HG: ICD-10-PCS | Mod: CPTII,,, | Performed by: OBSTETRICS & GYNECOLOGY

## 2022-01-20 PROCEDURE — 87624 HPV HI-RISK TYP POOLED RSLT: CPT | Performed by: OBSTETRICS & GYNECOLOGY

## 2022-01-20 PROCEDURE — 3008F BODY MASS INDEX DOCD: CPT | Mod: CPTII,,, | Performed by: OBSTETRICS & GYNECOLOGY

## 2022-01-20 PROCEDURE — 99385 PR PREVENTIVE VISIT,NEW,18-39: ICD-10-PCS | Mod: S$PBB,,, | Performed by: OBSTETRICS & GYNECOLOGY

## 2022-01-20 PROCEDURE — 99999 PR PBB SHADOW E&M-EST. PATIENT-LVL III: CPT | Mod: PBBFAC,,, | Performed by: OBSTETRICS & GYNECOLOGY

## 2022-01-20 PROCEDURE — 99999 PR PBB SHADOW E&M-EST. PATIENT-LVL III: ICD-10-PCS | Mod: PBBFAC,,, | Performed by: OBSTETRICS & GYNECOLOGY

## 2022-01-20 PROCEDURE — 88175 CYTOPATH C/V AUTO FLUID REDO: CPT | Performed by: OBSTETRICS & GYNECOLOGY

## 2022-01-20 PROCEDURE — 3008F PR BODY MASS INDEX (BMI) DOCUMENTED: ICD-10-PCS | Mod: CPTII,,, | Performed by: OBSTETRICS & GYNECOLOGY

## 2022-01-20 PROCEDURE — 99385 PREV VISIT NEW AGE 18-39: CPT | Mod: S$PBB,,, | Performed by: OBSTETRICS & GYNECOLOGY

## 2022-01-20 PROCEDURE — 88305 TISSUE EXAM BY PATHOLOGIST: CPT | Performed by: PATHOLOGY

## 2022-01-20 PROCEDURE — 99213 OFFICE O/P EST LOW 20 MIN: CPT | Mod: PBBFAC,PN | Performed by: OBSTETRICS & GYNECOLOGY

## 2022-01-20 PROCEDURE — 3075F SYST BP GE 130 - 139MM HG: CPT | Mod: CPTII,,, | Performed by: OBSTETRICS & GYNECOLOGY

## 2022-01-20 PROCEDURE — 3080F DIAST BP >= 90 MM HG: CPT | Mod: CPTII,,, | Performed by: OBSTETRICS & GYNECOLOGY

## 2022-01-20 PROCEDURE — 88305 TISSUE EXAM BY PATHOLOGIST: ICD-10-PCS | Mod: 26,,, | Performed by: PATHOLOGY

## 2022-01-20 PROCEDURE — 88305 TISSUE EXAM BY PATHOLOGIST: CPT | Mod: 26,,, | Performed by: PATHOLOGY

## 2022-01-20 PROCEDURE — 1159F PR MEDICATION LIST DOCUMENTED IN MEDICAL RECORD: ICD-10-PCS | Mod: CPTII,,, | Performed by: OBSTETRICS & GYNECOLOGY

## 2022-01-20 PROCEDURE — 81025 URINE PREGNANCY TEST: CPT | Mod: PBBFAC,PN | Performed by: OBSTETRICS & GYNECOLOGY

## 2022-01-20 PROCEDURE — 3075F PR MOST RECENT SYSTOLIC BLOOD PRESS GE 130-139MM HG: ICD-10-PCS | Mod: CPTII,,, | Performed by: OBSTETRICS & GYNECOLOGY

## 2022-01-20 PROCEDURE — 1159F MED LIST DOCD IN RCRD: CPT | Mod: CPTII,,, | Performed by: OBSTETRICS & GYNECOLOGY

## 2022-01-20 NOTE — PROGRESS NOTES
"  Reason for visit: Well woman exam      HPI:   31 y.o. female  presents for a well woman exam. Hx of AUB since menarche at 13 y/o; became regular after first birth 12 years ago; 2021 became irregular after COV-19 vaccine ([employer])- heavy bleeding 14 days straight; 2021-2022- heavy bleeding for 1 month straight with big clots, intense intermittent cramps; chronic fatigue; slight depression    Patient's last menstrual period was 2021    Contraception: none    New patient: Not seen since 2016    Menopausal: N/A    History of abnormal paps: none; last pap 2016  Abnormal or postmenopausal bleeding: heavy, prolonged cycles  History of abnormal mammograms: None  Family history of breast or ovarian cancer: None  Any breast masses, pain, skin changes, or nipple discharge: None  Possible recent STD exposure: None, not currently sexually active - last encounter   Contraception: None    Reviewed:    Pap: No result found  Mammogram: N/A    Past medical, surgical, social, family, and obstetric history: Reviewed and updated in EMR.  Medications: Reviewed and updated in EMR.  Allergies: Pcn [penicillins]      Review of Systems   Constitutional: Negative for chills and fever.   Eyes: Negative for visual disturbance.   Respiratory: Negative for cough and shortness of breath.    Cardiovascular: Negative for chest pain and leg swelling.   Gastrointestinal: Negative for abdominal pain, nausea and vomiting.   Genitourinary: Positive for vaginal bleeding. Negative for dysuria, flank pain, frequency and urgency.   Integumentary:  Negative for breast mass.   Neurological: Negative for syncope and headaches.   Psychiatric/Behavioral: Negative for depression. The patient is not nervous/anxious.    All other systems reviewed and are negative.  Breast: Negative for mass and mastodynia        Vitals: BP (!) 136/90 (BP Location: Left arm, Patient Position: Sitting)   Ht 5' 5" (1.651 m)   Wt 127.4 kg (280 lb 13.9 oz) "   LMP 01/23/2021   BMI 46.74 kg/m²     Physical Exam  Constitutional:       Appearance: She is obese.   Genitourinary:      Vulva normal.      Right Labia: No rash, lesions or Bartholin's cyst.     Left Labia: No lesions, Bartholin's cyst or rash.     No vaginal discharge or bleeding.        Right Adnexa: not tender and not full.     Left Adnexa: not tender and not full.     No cervical motion tenderness, discharge or lesion.      Uterus is enlarged (exam limited by body habitus).      Uterus is not tender.      Pelvic exam was performed with patient in the lithotomy position.   Breasts: Breasts are symmetrical.      Right: No mass, nipple discharge, skin change, tenderness or axillary adenopathy.      Left: No mass, nipple discharge, skin change, tenderness or axillary adenopathy.       Lymphadenopathy:      Upper Body:      Right upper body: No axillary adenopathy.      Left upper body: No axillary adenopathy.   Neurological:      Mental Status: She is alert.   Vitals reviewed. Exam conducted with a chaperone present.           Assessment and Plan:     Abnormal uterine bleeding (AUB)  -     Liquid-Based Pap Smear, Screening  -     HPV High Risk Genotypes, PCR  -     CBC Auto Differential; Future; Expected date: 01/20/2022  -     TSH; Future; Expected date: 01/20/2022  -     Prolactin; Future; Expected date: 01/20/2022  -     US Pelvis Comp with Transvag NON-OB (xpd; Future; Expected date: 01/20/2022  -     POCT urine pregnancy  -     Endometrial biopsy  -     Specimen to Pathology, Ob/Gyn    Hypertension, unspecified type  -     Ambulatory referral/consult to Family Practice; Future; Expected date: 01/27/2022         Annual exam  o Breast and pelvic exam: performed today, enlarged uterus - suspected leiomyoma  o Patient was counseled on [facility] guidelines for cervical cytology screening  o Cervical screening: Pap cotest sent today  o Patient was counseled on current recommendations for breast cancer  screening  o Mammogram screening: @ 40yoa unless new risk factors warrant earlier screening  o Colonoscopy screening: @ 45yoa unless new risk factors warrant earlier screening  o VitD/Ca supplementation recommendations based on age:  - <50yoa - Ca 1000mg/day, VitD 600IU/day  - 51-70yoa - Ca 1200mg/day, VitD 600IU/day  - >71yoa - Ca 1200mg/day, VitD 800IU/day   STD testing: Declined   Contraception: Declined   EmBx performed today - see separate procedure note   HTN noted on vitals today - will refer to PCP    Workup for AUB ordered: labs, pelvic US and EmBx - will follow up results     She was counseled to follow up with her PCP for other routine health maintenance    Mame Gilmore MS3    Seen and examined.  Agree with note.  All questions answered  TENZIN Cates MD

## 2022-01-24 ENCOUNTER — HOSPITAL ENCOUNTER (OUTPATIENT)
Dept: RADIOLOGY | Facility: OTHER | Age: 32
Discharge: HOME | End: 2022-01-24
Attending: OBSTETRICS & GYNECOLOGY
Payer: MEDICAID

## 2022-01-24 DIAGNOSIS — N93.9 ABNORMAL UTERINE BLEEDING (AUB): ICD-10-CM

## 2022-01-24 PROCEDURE — 76856 US PELVIS COMP WITH TRANSVAG NON-OB (XPD): ICD-10-PCS | Mod: 26,,, | Performed by: RADIOLOGY

## 2022-01-24 PROCEDURE — 76856 US EXAM PELVIC COMPLETE: CPT | Mod: 26,,, | Performed by: RADIOLOGY

## 2022-01-24 PROCEDURE — 76830 US PELVIS COMP WITH TRANSVAG NON-OB (XPD): ICD-10-PCS | Mod: 26,,, | Performed by: RADIOLOGY

## 2022-01-24 PROCEDURE — 76830 TRANSVAGINAL US NON-OB: CPT | Mod: TC

## 2022-01-24 PROCEDURE — 76830 TRANSVAGINAL US NON-OB: CPT | Mod: 26,,, | Performed by: RADIOLOGY

## 2022-01-26 LAB
FINAL PATHOLOGIC DIAGNOSIS: NORMAL
Lab: NORMAL

## 2022-01-27 LAB
FINAL PATHOLOGIC DIAGNOSIS: NORMAL
HPV HR 12 DNA SPEC QL NAA+PROBE: NEGATIVE
HPV16 AG SPEC QL: NEGATIVE
HPV18 DNA SPEC QL NAA+PROBE: NEGATIVE
Lab: NORMAL

## 2022-01-28 ENCOUNTER — PATIENT MESSAGE (OUTPATIENT)
Dept: OBSTETRICS AND GYNECOLOGY | Facility: CLINIC | Age: 32
End: 2022-01-28
Payer: MEDICAID

## 2022-01-31 ENCOUNTER — OFFICE VISIT (OUTPATIENT)
Dept: URGENT CARE | Facility: CLINIC | Age: 32
End: 2022-01-31
Payer: MEDICAID

## 2022-01-31 VITALS
HEIGHT: 65 IN | DIASTOLIC BLOOD PRESSURE: 88 MMHG | BODY MASS INDEX: 46.65 KG/M2 | TEMPERATURE: 100 F | RESPIRATION RATE: 17 BRPM | OXYGEN SATURATION: 98 % | SYSTOLIC BLOOD PRESSURE: 132 MMHG | HEART RATE: 80 BPM | WEIGHT: 280 LBS

## 2022-01-31 DIAGNOSIS — R51.9 SINUS HEADACHE: ICD-10-CM

## 2022-01-31 DIAGNOSIS — R51.9 ACUTE NONINTRACTABLE HEADACHE, UNSPECIFIED HEADACHE TYPE: Primary | ICD-10-CM

## 2022-01-31 DIAGNOSIS — H92.01 RIGHT EAR PAIN: ICD-10-CM

## 2022-01-31 PROCEDURE — 1160F RVW MEDS BY RX/DR IN RCRD: CPT | Mod: CPTII,S$GLB,, | Performed by: PHYSICIAN ASSISTANT

## 2022-01-31 PROCEDURE — 3075F SYST BP GE 130 - 139MM HG: CPT | Mod: CPTII,S$GLB,, | Performed by: PHYSICIAN ASSISTANT

## 2022-01-31 PROCEDURE — 1159F MED LIST DOCD IN RCRD: CPT | Mod: CPTII,S$GLB,, | Performed by: PHYSICIAN ASSISTANT

## 2022-01-31 PROCEDURE — 1159F PR MEDICATION LIST DOCUMENTED IN MEDICAL RECORD: ICD-10-PCS | Mod: CPTII,S$GLB,, | Performed by: PHYSICIAN ASSISTANT

## 2022-01-31 PROCEDURE — 3008F PR BODY MASS INDEX (BMI) DOCUMENTED: ICD-10-PCS | Mod: CPTII,S$GLB,, | Performed by: PHYSICIAN ASSISTANT

## 2022-01-31 PROCEDURE — 3075F PR MOST RECENT SYSTOLIC BLOOD PRESS GE 130-139MM HG: ICD-10-PCS | Mod: CPTII,S$GLB,, | Performed by: PHYSICIAN ASSISTANT

## 2022-01-31 PROCEDURE — 3079F PR MOST RECENT DIASTOLIC BLOOD PRESSURE 80-89 MM HG: ICD-10-PCS | Mod: CPTII,S$GLB,, | Performed by: PHYSICIAN ASSISTANT

## 2022-01-31 PROCEDURE — 3079F DIAST BP 80-89 MM HG: CPT | Mod: CPTII,S$GLB,, | Performed by: PHYSICIAN ASSISTANT

## 2022-01-31 PROCEDURE — 99213 PR OFFICE/OUTPT VISIT, EST, LEVL III, 20-29 MIN: ICD-10-PCS | Mod: S$GLB,,, | Performed by: PHYSICIAN ASSISTANT

## 2022-01-31 PROCEDURE — 1160F PR REVIEW ALL MEDS BY PRESCRIBER/CLIN PHARMACIST DOCUMENTED: ICD-10-PCS | Mod: CPTII,S$GLB,, | Performed by: PHYSICIAN ASSISTANT

## 2022-01-31 PROCEDURE — 3008F BODY MASS INDEX DOCD: CPT | Mod: CPTII,S$GLB,, | Performed by: PHYSICIAN ASSISTANT

## 2022-01-31 PROCEDURE — 99213 OFFICE O/P EST LOW 20 MIN: CPT | Mod: S$GLB,,, | Performed by: PHYSICIAN ASSISTANT

## 2022-01-31 NOTE — PATIENT INSTRUCTIONS
- Rest.  - Drink plenty of fluids.  - Take Tylenol (or Excedrin) and/or Ibuprofen as directed as needed for fever/pain.  Do not take more than the recommended dose.  - follow up with your PCP within the next 1-2 weeks as needed.  - Take Sudafed as directed.  - You must understand that you have received an Urgent Care treatment only and that you may be released before all of your medical problems are known or treated.   - You, the patient, will arrange for follow up care as instructed.   - If your condition worsens or fails to improve we recommend that you receive another evaluation at the ER immediately or contact your PCP to discuss your concerns.   - You can call [phone] or [phone] to help schedule an appointment with the appropriate provider.    Patient Education       Headache Discharge Instructions, Adult   About this topic   Headache is the word used to describe aching or pain in the head. There are many types of headaches. Some of them are:  · Headaches that are from an illness or injury. These may be caused from a virus or other infection. They can also happen when you do not get enough to drink.  · Tension headaches may have mild to moderate pain. The pain may feel like it is squeezing, pressure, dull, or aching. You may have pain in the front, back, or both sides of head. Tension headaches are not often bad enough to keep you from doing daily activities. Some people may not feel like doing anything while they have the headache. Tension headaches can last from 30 minutes to 7 days.  · Migraine headaches may cause moderate to severe pain. The pain may throb on one or both sides of the head. These headaches often start off mild and get worse. You are often not able to do normal activities. This kind of headache may also have other signs with it like throwing up and not being able to be around light or sound.  · Cluster headaches are severe and happen again and again but for short periods of  "time. The pain is burning, sharp, and keeps hurting. The pain may happen behind or around your eye. It can also be on one side of your face. Signs can include a stuffed, runny nose and red, watery eye on the side of pain. They can happen because of drinking alcohol, smoking, heat, and bright lights. Some drugs can also cause this type of headache.  · A sinus headache is often either a migraine or tension headache. Sinusitis should not cause repeat headaches If you have pain over your nose or sinuses, a fever and thick liquid coming from your nose, you may have a sinus infection.  · Medication overuse headaches can happen if you have had many headaches and have taken headache medicine to help with them.  Not all headaches need to be checked by a doctor. Some kinds may be a sign of a serious problem. Care for headaches will depend on what is causing them.  What care is needed at home?   · Ask your doctor what you need to do when you go home. Make sure you ask questions if you do not understand what the doctor says.  · You can take drugs like acetaminophen, ibuprofen, or naproxen for pain as instructed, but use of these pain medicines should be limited. If you need to take pain medicines every day for headaches, call your doctor.  · If possible, lie down in a quiet, dark room.  · Make sure you eat at regular times. Do not skip meals. Drink plenty of fluids. Be sure you are getting enough sleep.  · If you have frequent headaches that interfere with your activities, you can keep a "headache diary." This might help to see if there is a pattern to your headaches. Make notes about:  ? Where your pain is on your head or neck.  ? When you have the pain and how long it lasts.  ? How your pain feels. Is it dull, sharp, burning, stabbing, or cramping?  ? What causes your pain?  ? What makes your pain better or worse?     What follow-up care is needed?   · Your doctor may ask you to make visits to the office to check on your " progress. Be sure to keep these visits.  · Your doctor may want to do tests if the headache comes back. The results will help the doctor understand what kind of headache you have and what causes it. Together you can make a plan for more care.  What drugs may be needed?   Your doctor may order drugs based on the type of headache you have. The doctor may order drugs to:  · Help with pain  · Prevent or stop the headache  · Treat upset stomach and throwing up  · Treat high blood pressure  · Treat low mood  · Treat hormonal imbalance  Will physical activity be limited?   Headaches may be painful enough to stop you from doing your normal activities. The pain may make you stay at home from work or school.  What problems could happen?   Headache may be part of a more serious health problem.  What can be done to prevent this health problem?   · Take the drugs your doctor prescribes. Some may help to keep from getting headaches. Your doctor may give you drugs to try to stop the headache or lower how long the headache lasts.  · Avoid stress. Learn how to cope with things that cause stress. Try to relax. Do relaxation exercises daily like deep breathing, meditation, or yoga.  · Avoid alcohol and smoking. These can make headaches worse.  · Hold the phone rather than resting it on your shoulder, or use a headset.  · Maintain good posture and exercise regularly.  When do I need to call the doctor?   · You have a seizure.  · You have signs of stroke like sudden:  ? Numbness or weakness of the face, arm, or leg, especially on one side of the body.  ? Confusion, trouble speaking, or understanding.  ? Trouble seeing in one or both eyes.  ? Trouble walking, dizziness, loss of balance, or coordination.  ? Severe headache with no known cause.  · You feel extremely weak, confused, or lethargic, or you pass out.  · You have a headache along with neck pain, neck stiffness, fever, or chills.  · You have a headache along with a new skin  rash.  · You have significant nausea or vomiting with your headache.  · The headache lasts more than a few days or the pain gets worse or comes more often.  Teach Back: Helping You Understand   The Teach Back Method helps you understand the information we are giving you. After you talk with the staff, tell them in your own words what you learned. This helps to make sure the staff has described each thing clearly. It also helps to explain things that may have been confusing. Before going home, make sure you can do these:  · I can tell you about my condition.  · I can tell you what may help ease my pain.  · I can tell you what I will do if there is a change in my headaches.  Where can I learn more?   American Academy of [employer]icians  http://familydoctor.org/familydoctor/en/diseases-conditions/headaches.html   National [place] of Neurological Disorders and Stroke  https://www.ninds.nih.gov/Disorders/All-Disorders/Headache-Information-Page   [facility] Choices  http://www.nhs.uk/conditions/headache/Pages/Introduction.aspx   Last Reviewed Date   2021-06-16  Consumer Information Use and Disclaimer   This information is not specific medical advice and does not replace information you receive from your health care provider. This is only a brief summary of general information. It does NOT include all information about conditions, illnesses, injuries, tests, procedures, treatments, therapies, discharge instructions or life-style choices that may apply to you. You must talk with your health care provider for complete information about your health and treatment options. This information should not be used to decide whether or not to accept your health care providers advice, instructions or recommendations. Only your health care provider has the knowledge and training to provide advice that is right for you.  Copyright   Copyright © 2021 [employer] and its affiliates and/or licensors. All rights reserved.  Patient  Education       Serous Otitis Media Discharge Instructions   About this topic   The Eustachian tube allows fluid to drain from the middle ear. Sometimes, fluid cannot drain from the tube. This may cause an acute or chronic problem known as serous otitis media. An acute problem is one that does not last for a long time. Acute serous otitis media is often caused by an infection or allergy. A chronic problem is one that lasts for a long time. Chronic serous otitis media may happen when the tube stays blocked because the fluid is too thick to drain from the tube.  This problem may go away on its own without treatment.     What care is needed at home?   · Ask your doctor what you need to do when you go home. Make sure you ask questions if you do not understand what the doctor says. This way you will know what you need to do.  · If the doctor orders antibiotics, be sure to take all of them, even if you start to feel better.  · Do not put anything in your ear unless it was ordered by the doctor.  · You may want to take medicines like ibuprofen, naproxen, or acetaminophen to help with pain.  What follow-up care is needed?   · Your doctor may ask you to make visits to the office to check on your progress. Be sure to keep these visits.  · Have ear and hearing tests done regularly as ordered by your doctor.  What drugs may be needed?   Your doctor may order drugs based on what problems you are having. The doctor may order drugs to:  · Help with pain and swelling  · Fight an infection   · Treat an allergy  Will physical activity be limited?   Talk with your doctor about the right amount of activity for you.  What problems could happen?   · Infection could come back  · Loss of hearing  · Problems with speech  · Scarring on the eardrum  What can be done to prevent this health problem?   · If you smoke, quit. Do not go near people who smoke.  · Stay away from people who have colds.  · If you have allergies, treat them, and try to  avoid your triggers.  · Wash your hands often.  · If over 12 months of age, stop daytime use of a pacifier.  When do I need to call the doctor?   · Your symptoms are not getting better in 2 to 3 days.  · You continue to have problems hearing after 2 to 3 weeks.  · You have a fever of 100.4°F (38°C) or higher or chills.  · You have discharge or fluid coming from your ear.  Teach Back: Helping You Understand   The Teach Back Method helps you understand the information we are giving you. After you talk with the staff, tell them in your own words what you learned. This helps to make sure the staff has described each thing clearly. It also helps to explain things that may have been confusing. Before going home, make sure you can do these:  · I can tell you about my condition.  · I can tell you what may help ease my pain.  · I can tell you what I will do if I have a fever, ear pain, or redness and pain behind my ear.  Where can I learn more?   American Academy of Family Physicians  https://familydoctor.org/condition/ear-infection/   American Academy of Family Physicians  https://familydoctor.org/condition/eustachian-tube-dysfunction/   American Academy of Family Physicians  https://familydoctor.org/condition/otitis-media-with-effusion/   Last Reviewed Date   2021-06-21  Consumer Information Use and Disclaimer   This information is not specific medical advice and does not replace information you receive from your health care provider. This is only a brief summary of general information. It does NOT include all information about conditions, illnesses, injuries, tests, procedures, treatments, therapies, discharge instructions or life-style choices that may apply to you. You must talk with your health care provider for complete information about your health and treatment options. This information should not be used to decide whether or not to accept your health care providers advice, instructions or recommendations. Only  your health care provider has the knowledge and training to provide advice that is right for you.  Copyright   Copyright © 2021 [employer] and its affiliates and/or licensors. All rights reserved.  Patient Education       Sinus Headache Discharge Instructions   About this topic   A sinus headache is caused by infected sinuses. Sinuses are small air spaces in the head behind the nose, eyes, and cheeks. They lead into the nose. They allow fluid, called mucus, to drain. They can swell due to an allergy or an infection. When they swell, fluid gets trapped and it can get infected. This causes pressure in your head. If the pressure is very bad you will feel pain. If you move suddenly or lean forward the pain often gets worse. The pain is dull and throbs. You may feel it in the top of your face. You may also ache and have a yellow-green drainage from your nose. Your doctor may order drugs to stop the infection and to help with swelling.           What care is needed at home?   · Ask your doctor what you need to do when you go home. Make sure you ask questions if you do not understand what the doctor says. This way you will know what you need to do.  · Use a salt water or saline rinse in your nose. Talk to your doctor about how often you should do this.  · Hold a warm cloth to your sinuses for a few minutes. Then, hold a cold cloth to your sinuses for 30 seconds. Repeat a few times a day.  · Take a hot bath or shower. Breathe in steam from a bowl of hot water or hot shower. This moist air can help the headache.  · Drink 6 to 8 glasses of water each day.  · Avoid beer, wine, and mixed drinks (alcohol). This can make the nose and sinuses swell.  · Do not smoke and avoid areas where people smoke.  What follow-up care is needed?   Your doctor may ask you to make visits to the office to check on your progress. Be sure to keep these visits.  What drugs may be needed?   The doctor may order drugs to:  · Relieve  headache  · Help with pain and swelling  · Treat an allergy  · Moisten your nose  · Stop an infection  Will physical activity be limited?   You may be more comfortable if you do not lean forward or lie down. Try to sleep with your head and shoulders propped on a few pillows. Talk to your doctor about the right amount of activity for you.  What can be done to prevent this health problem?   · Avoid fumes, smoke, dust, and other allergens. These can bring on your headache.  · Look for treatment if you have a cold or an infection. This can keep you from getting sinusitis.  When do I need to call the doctor?   · Signs of infection. These include a fever of 100.4°F (38°C) or higher, chills, very bad sore throat, ear or sinus pain, cough, more sputum or change in color of sputum.  · Sinus headache lasts for more than a week  · The drugs your doctor gave you do not work  · You are not feeling better in 2 to 3 days or you are feeling worse  Teach Back: Helping You Understand   The Teach Back Method helps you understand the information we are giving you. The idea is simple. After talking with the staff, tell them in your own words what you were just told. This helps to make sure the staff has covered each thing clearly. It also helps to explain things that may have been a bit confusing. Before going home, make sure you are able to do these:  · I can tell you about my condition.  · I can tell you what may help ease my pain.  · I can tell you what I will do if my headache lasts for more than a week.  Where can I learn more?   American [employer] Foundation  https://americanmigrainefoundation.org/understanding-migraine/sinus-headaches/   [facility] Choices  http://www.nhs.uk/conditions/sinus-headache/Pages/Introduction.aspx   Last Reviewed Date   2018-10-19  Consumer Information Use and Disclaimer   This information is not specific medical advice and does not replace information you receive from your health care provider. This is only a  brief summary of general information. It does NOT include all information about conditions, illnesses, injuries, tests, procedures, treatments, therapies, discharge instructions or life-style choices that may apply to you. You must talk with your health care provider for complete information about your health and treatment options. This information should not be used to decide whether or not to accept your health care providers advice, instructions or recommendations. Only your health care provider has the knowledge and training to provide advice that is right for you.  Copyright   Copyright © 2021 [employer] and its affiliates and/or licensors. All rights reserved.

## 2022-01-31 NOTE — PROGRESS NOTES
"Subjective:       Patient ID: Pinky Kate is a 31 y.o. female.    Vitals:  height is 5' 5" (1.651 m) and weight is 127 kg (280 lb). Her temporal temperature is 99.6 °F (37.6 °C). Her blood pressure is 132/88 and her pulse is 80. Her respiration is 17 and oxygen saturation is 98%.     Chief Complaint: Otalgia    Pt states that she ia having right ear pain and a headache that started 1-2 days ago . Pt is taking tylenol for the pain .  She denies fever or chills.  She took Tylenol with some relief of the headache.  She denies any significant sinus congestion.    Otalgia   There is pain in the right ear. This is a new problem. The current episode started in the past 7 days. The problem occurs constantly. The problem has been unchanged. There has been no fever. The pain is at a severity of 6/10. The pain is moderate. Associated symptoms include headaches. Pertinent negatives include no diarrhea, neck pain, rash, sore throat or vomiting. She has tried acetaminophen for the symptoms.       Constitution: Negative for chills and fever.   HENT: Positive for ear pain. Negative for congestion and sore throat.    Neck: Negative for neck pain.   Cardiovascular: Negative for chest pain.   Eyes: Negative for blurred vision.   Respiratory: Negative for shortness of breath.    Gastrointestinal: Negative for nausea, vomiting and diarrhea.   Musculoskeletal: Negative for pain, joint pain and joint swelling.   Skin: Negative for rash.   Neurological: Positive for headaches. Negative for altered mental status and numbness.   Psychiatric/Behavioral: Negative for altered mental status.       Objective:      Physical Exam   Constitutional: She is oriented to person, place, and time. She appears well-developed. No distress.   HENT:   Head: Normocephalic and atraumatic.   Ears:   Right Ear: Hearing, tympanic membrane, external ear and ear canal normal.   Left Ear: Hearing, tympanic membrane, external ear and ear canal normal.   Nose: " Mucosal edema (R > L) present. Right sinus exhibits maxillary sinus tenderness. Right sinus exhibits no frontal sinus tenderness. Left sinus exhibits no maxillary sinus tenderness and no frontal sinus tenderness.   Mouth/Throat: Uvula is midline and oropharynx is clear and moist.   Eyes: Conjunctivae are normal.   Cardiovascular: Normal rate, regular rhythm and normal heart sounds.   No murmur heard.Exam reveals no gallop and no friction rub.   Pulmonary/Chest: Effort normal and breath sounds normal. No respiratory distress. She has no wheezes.   Musculoskeletal: Normal range of motion.         General: No tenderness. Normal range of motion.   Lymphadenopathy:        Head (right side): No posterior auricular (+ TTP) adenopathy present.        Head (left side): No posterior auricular adenopathy present.     She has no cervical adenopathy.   Neurological: She is alert and oriented to person, place, and time.   Skin: Skin is warm, dry and not diaphoretic.   Psychiatric: Her behavior is normal. Judgment and thought content normal.   Nursing note and vitals reviewed.        Assessment:       1. Acute nonintractable headache, unspecified headache type    2. Right ear pain    3. Sinus headache          Plan:         Acute nonintractable headache, unspecified headache type    Right ear pain    Sinus headache                 Patient Instructions   - Rest.  - Drink plenty of fluids.  - Take Tylenol (or Excedrin) and/or Ibuprofen as directed as needed for fever/pain.  Do not take more than the recommended dose.  - follow up with your PCP within the next 1-2 weeks as needed.  - Take Sudafed as directed.  - You must understand that you have received an Urgent Care treatment only and that you may be released before all of your medical problems are known or treated.   - You, the patient, will arrange for follow up care as instructed.   - If your condition worsens or fails to improve we recommend that you receive another evaluation  at the ER immediately or contact your PCP to discuss your concerns.   - You can call [phone] or [phone] to help schedule an appointment with the appropriate provider.    Patient Education       Headache Discharge Instructions, Adult   About this topic   Headache is the word used to describe aching or pain in the head. There are many types of headaches. Some of them are:  · Headaches that are from an illness or injury. These may be caused from a virus or other infection. They can also happen when you do not get enough to drink.  · Tension headaches may have mild to moderate pain. The pain may feel like it is squeezing, pressure, dull, or aching. You may have pain in the front, back, or both sides of head. Tension headaches are not often bad enough to keep you from doing daily activities. Some people may not feel like doing anything while they have the headache. Tension headaches can last from 30 minutes to 7 days.  · Migraine headaches may cause moderate to severe pain. The pain may throb on one or both sides of the head. These headaches often start off mild and get worse. You are often not able to do normal activities. This kind of headache may also have other signs with it like throwing up and not being able to be around light or sound.  · Cluster headaches are severe and happen again and again but for short periods of time. The pain is burning, sharp, and keeps hurting. The pain may happen behind or around your eye. It can also be on one side of your face. Signs can include a stuffed, runny nose and red, watery eye on the side of pain. They can happen because of drinking alcohol, smoking, heat, and bright lights. Some drugs can also cause this type of headache.  · A sinus headache is often either a migraine or tension headache. Sinusitis should not cause repeat headaches If you have pain over your nose or sinuses, a fever and thick liquid coming from your nose, you may have a sinus  "infection.  · Medication overuse headaches can happen if you have had many headaches and have taken headache medicine to help with them.  Not all headaches need to be checked by a doctor. Some kinds may be a sign of a serious problem. Care for headaches will depend on what is causing them.  What care is needed at home?   · Ask your doctor what you need to do when you go home. Make sure you ask questions if you do not understand what the doctor says.  · You can take drugs like acetaminophen, ibuprofen, or naproxen for pain as instructed, but use of these pain medicines should be limited. If you need to take pain medicines every day for headaches, call your doctor.  · If possible, lie down in a quiet, dark room.  · Make sure you eat at regular times. Do not skip meals. Drink plenty of fluids. Be sure you are getting enough sleep.  · If you have frequent headaches that interfere with your activities, you can keep a "headache diary." This might help to see if there is a pattern to your headaches. Make notes about:  ? Where your pain is on your head or neck.  ? When you have the pain and how long it lasts.  ? How your pain feels. Is it dull, sharp, burning, stabbing, or cramping?  ? What causes your pain?  ? What makes your pain better or worse?     What follow-up care is needed?   · Your doctor may ask you to make visits to the office to check on your progress. Be sure to keep these visits.  · Your doctor may want to do tests if the headache comes back. The results will help the doctor understand what kind of headache you have and what causes it. Together you can make a plan for more care.  What drugs may be needed?   Your doctor may order drugs based on the type of headache you have. The doctor may order drugs to:  · Help with pain  · Prevent or stop the headache  · Treat upset stomach and throwing up  · Treat high blood pressure  · Treat low mood  · Treat hormonal imbalance  Will physical activity be limited? "   Headaches may be painful enough to stop you from doing your normal activities. The pain may make you stay at home from work or school.  What problems could happen?   Headache may be part of a more serious health problem.  What can be done to prevent this health problem?   · Take the drugs your doctor prescribes. Some may help to keep from getting headaches. Your doctor may give you drugs to try to stop the headache or lower how long the headache lasts.  · Avoid stress. Learn how to cope with things that cause stress. Try to relax. Do relaxation exercises daily like deep breathing, meditation, or yoga.  · Avoid alcohol and smoking. These can make headaches worse.  · Hold the phone rather than resting it on your shoulder, or use a headset.  · Maintain good posture and exercise regularly.  When do I need to call the doctor?   · You have a seizure.  · You have signs of stroke like sudden:  ? Numbness or weakness of the face, arm, or leg, especially on one side of the body.  ? Confusion, trouble speaking, or understanding.  ? Trouble seeing in one or both eyes.  ? Trouble walking, dizziness, loss of balance, or coordination.  ? Severe headache with no known cause.  · You feel extremely weak, confused, or lethargic, or you pass out.  · You have a headache along with neck pain, neck stiffness, fever, or chills.  · You have a headache along with a new skin rash.  · You have significant nausea or vomiting with your headache.  · The headache lasts more than a few days or the pain gets worse or comes more often.  Teach Back: Helping You Understand   The Teach Back Method helps you understand the information we are giving you. After you talk with the staff, tell them in your own words what you learned. This helps to make sure the staff has described each thing clearly. It also helps to explain things that may have been confusing. Before going home, make sure you can do these:  · I can tell you about my condition.  · I can  tell you what may help ease my pain.  · I can tell you what I will do if there is a change in my headaches.  Where can I learn more?   American Academy of Family Physicians  http://familydoctor.org/familydoctor/en/diseases-conditions/headaches.html   National [place] of Neurological Disorders and Stroke  https://www.ninds.nih.gov/Disorders/All-Disorders/Headache-Information-Page   [facility] Choices  http://www.nhs.uk/conditions/headache/Pages/Introduction.aspx   Last Reviewed Date   2021-06-16  Consumer Information Use and Disclaimer   This information is not specific medical advice and does not replace information you receive from your health care provider. This is only a brief summary of general information. It does NOT include all information about conditions, illnesses, injuries, tests, procedures, treatments, therapies, discharge instructions or life-style choices that may apply to you. You must talk with your health care provider for complete information about your health and treatment options. This information should not be used to decide whether or not to accept your health care providers advice, instructions or recommendations. Only your health care provider has the knowledge and training to provide advice that is right for you.  Copyright   Copyright © 2021 [employer] and its affiliates and/or licensors. All rights reserved.  Patient Education       Serous Otitis Media Discharge Instructions   About this topic   The Eustachian tube allows fluid to drain from the middle ear. Sometimes, fluid cannot drain from the tube. This may cause an acute or chronic problem known as serous otitis media. An acute problem is one that does not last for a long time. Acute serous otitis media is often caused by an infection or allergy. A chronic problem is one that lasts for a long time. Chronic serous otitis media may happen when the tube stays blocked because the fluid is too thick to drain from the tube.  This problem  may go away on its own without treatment.     What care is needed at home?   · Ask your doctor what you need to do when you go home. Make sure you ask questions if you do not understand what the doctor says. This way you will know what you need to do.  · If the doctor orders antibiotics, be sure to take all of them, even if you start to feel better.  · Do not put anything in your ear unless it was ordered by the doctor.  · You may want to take medicines like ibuprofen, naproxen, or acetaminophen to help with pain.  What follow-up care is needed?   · Your doctor may ask you to make visits to the office to check on your progress. Be sure to keep these visits.  · Have ear and hearing tests done regularly as ordered by your doctor.  What drugs may be needed?   Your doctor may order drugs based on what problems you are having. The doctor may order drugs to:  · Help with pain and swelling  · Fight an infection   · Treat an allergy  Will physical activity be limited?   Talk with your doctor about the right amount of activity for you.  What problems could happen?   · Infection could come back  · Loss of hearing  · Problems with speech  · Scarring on the eardrum  What can be done to prevent this health problem?   · If you smoke, quit. Do not go near people who smoke.  · Stay away from people who have colds.  · If you have allergies, treat them, and try to avoid your triggers.  · Wash your hands often.  · If over 12 months of age, stop daytime use of a pacifier.  When do I need to call the doctor?   · Your symptoms are not getting better in 2 to 3 days.  · You continue to have problems hearing after 2 to 3 weeks.  · You have a fever of 100.4°F (38°C) or higher or chills.  · You have discharge or fluid coming from your ear.  Teach Back: Helping You Understand   The Teach Back Method helps you understand the information we are giving you. After you talk with the staff, tell them in your own words what you learned. This helps to  make sure the staff has described each thing clearly. It also helps to explain things that may have been confusing. Before going home, make sure you can do these:  · I can tell you about my condition.  · I can tell you what may help ease my pain.  · I can tell you what I will do if I have a fever, ear pain, or redness and pain behind my ear.  Where can I learn more?   American Academy of Family Physicians  https://familydoctor.org/condition/ear-infection/   American Academy of Family Physicians  https://familydoctor.org/condition/eustachian-tube-dysfunction/   American Academy of Family Physicians  https://familydoctor.org/condition/otitis-media-with-effusion/   Last Reviewed Date   2021-06-21  Consumer Information Use and Disclaimer   This information is not specific medical advice and does not replace information you receive from your health care provider. This is only a brief summary of general information. It does NOT include all information about conditions, illnesses, injuries, tests, procedures, treatments, therapies, discharge instructions or life-style choices that may apply to you. You must talk with your health care provider for complete information about your health and treatment options. This information should not be used to decide whether or not to accept your health care providers advice, instructions or recommendations. Only your health care provider has the knowledge and training to provide advice that is right for you.  Copyright   Copyright © 2021 [employer] and its affiliates and/or licensors. All rights reserved.  Patient Education       Sinus Headache Discharge Instructions   About this topic   A sinus headache is caused by infected sinuses. Sinuses are small air spaces in the head behind the nose, eyes, and cheeks. They lead into the nose. They allow fluid, called mucus, to drain. They can swell due to an allergy or an infection. When they swell, fluid gets trapped and it can get  infected. This causes pressure in your head. If the pressure is very bad you will feel pain. If you move suddenly or lean forward the pain often gets worse. The pain is dull and throbs. You may feel it in the top of your face. You may also ache and have a yellow-green drainage from your nose. Your doctor may order drugs to stop the infection and to help with swelling.           What care is needed at home?   · Ask your doctor what you need to do when you go home. Make sure you ask questions if you do not understand what the doctor says. This way you will know what you need to do.  · Use a salt water or saline rinse in your nose. Talk to your doctor about how often you should do this.  · Hold a warm cloth to your sinuses for a few minutes. Then, hold a cold cloth to your sinuses for 30 seconds. Repeat a few times a day.  · Take a hot bath or shower. Breathe in steam from a bowl of hot water or hot shower. This moist air can help the headache.  · Drink 6 to 8 glasses of water each day.  · Avoid beer, wine, and mixed drinks (alcohol). This can make the nose and sinuses swell.  · Do not smoke and avoid areas where people smoke.  What follow-up care is needed?   Your doctor may ask you to make visits to the office to check on your progress. Be sure to keep these visits.  What drugs may be needed?   The doctor may order drugs to:  · Relieve headache  · Help with pain and swelling  · Treat an allergy  · Moisten your nose  · Stop an infection  Will physical activity be limited?   You may be more comfortable if you do not lean forward or lie down. Try to sleep with your head and shoulders propped on a few pillows. Talk to your doctor about the right amount of activity for you.  What can be done to prevent this health problem?   · Avoid fumes, smoke, dust, and other allergens. These can bring on your headache.  · Look for treatment if you have a cold or an infection. This can keep you from getting sinusitis.  When do I need  to call the doctor?   · Signs of infection. These include a fever of 100.4°F (38°C) or higher, chills, very bad sore throat, ear or sinus pain, cough, more sputum or change in color of sputum.  · Sinus headache lasts for more than a week  · The drugs your doctor gave you do not work  · You are not feeling better in 2 to 3 days or you are feeling worse  Teach Back: Helping You Understand   The Teach Back Method helps you understand the information we are giving you. The idea is simple. After talking with the staff, tell them in your own words what you were just told. This helps to make sure the staff has covered each thing clearly. It also helps to explain things that may have been a bit confusing. Before going home, make sure you are able to do these:  · I can tell you about my condition.  · I can tell you what may help ease my pain.  · I can tell you what I will do if my headache lasts for more than a week.  Where can I learn more?   American Migraine Foundation  https://americanmigrainefoundation.org/understanding-migraine/sinus-headaches/   [facility] Choices  http://www.nhs.uk/conditions/sinus-headache/Pages/Introduction.aspx   Last Reviewed Date   2018-10-19  Consumer Information Use and Disclaimer   This information is not specific medical advice and does not replace information you receive from your health care provider. This is only a brief summary of general information. It does NOT include all information about conditions, illnesses, injuries, tests, procedures, treatments, therapies, discharge instructions or life-style choices that may apply to you. You must talk with your health care provider for complete information about your health and treatment options. This information should not be used to decide whether or not to accept your health care providers advice, instructions or recommendations. Only your health care provider has the knowledge and training to provide advice that is right for you.  Copyright    Copyright © 2021 [employer] and its affiliates and/or licensors. All rights reserved.

## 2022-02-03 ENCOUNTER — OFFICE VISIT (OUTPATIENT)
Dept: URGENT CARE | Facility: CLINIC | Age: 32
End: 2022-02-03
Payer: MEDICAID

## 2022-02-03 VITALS
TEMPERATURE: 98 F | OXYGEN SATURATION: 97 % | WEIGHT: 280 LBS | BODY MASS INDEX: 46.65 KG/M2 | HEIGHT: 65 IN | RESPIRATION RATE: 18 BRPM | HEART RATE: 110 BPM | DIASTOLIC BLOOD PRESSURE: 83 MMHG | SYSTOLIC BLOOD PRESSURE: 129 MMHG

## 2022-02-03 DIAGNOSIS — H65.01 NON-RECURRENT ACUTE SEROUS OTITIS MEDIA OF RIGHT EAR: Primary | ICD-10-CM

## 2022-02-03 DIAGNOSIS — J01.00 ACUTE NON-RECURRENT MAXILLARY SINUSITIS: ICD-10-CM

## 2022-02-03 PROCEDURE — 3074F PR MOST RECENT SYSTOLIC BLOOD PRESSURE < 130 MM HG: ICD-10-PCS | Mod: CPTII,S$GLB,, | Performed by: STUDENT IN AN ORGANIZED HEALTH CARE EDUCATION/TRAINING PROGRAM

## 2022-02-03 PROCEDURE — 99213 OFFICE O/P EST LOW 20 MIN: CPT | Mod: S$GLB,,, | Performed by: STUDENT IN AN ORGANIZED HEALTH CARE EDUCATION/TRAINING PROGRAM

## 2022-02-03 PROCEDURE — 3008F BODY MASS INDEX DOCD: CPT | Mod: CPTII,S$GLB,, | Performed by: STUDENT IN AN ORGANIZED HEALTH CARE EDUCATION/TRAINING PROGRAM

## 2022-02-03 PROCEDURE — 99213 PR OFFICE/OUTPT VISIT, EST, LEVL III, 20-29 MIN: ICD-10-PCS | Mod: S$GLB,,, | Performed by: STUDENT IN AN ORGANIZED HEALTH CARE EDUCATION/TRAINING PROGRAM

## 2022-02-03 PROCEDURE — 1159F PR MEDICATION LIST DOCUMENTED IN MEDICAL RECORD: ICD-10-PCS | Mod: CPTII,S$GLB,, | Performed by: STUDENT IN AN ORGANIZED HEALTH CARE EDUCATION/TRAINING PROGRAM

## 2022-02-03 PROCEDURE — 3079F DIAST BP 80-89 MM HG: CPT | Mod: CPTII,S$GLB,, | Performed by: STUDENT IN AN ORGANIZED HEALTH CARE EDUCATION/TRAINING PROGRAM

## 2022-02-03 PROCEDURE — 3079F PR MOST RECENT DIASTOLIC BLOOD PRESSURE 80-89 MM HG: ICD-10-PCS | Mod: CPTII,S$GLB,, | Performed by: STUDENT IN AN ORGANIZED HEALTH CARE EDUCATION/TRAINING PROGRAM

## 2022-02-03 PROCEDURE — 1159F MED LIST DOCD IN RCRD: CPT | Mod: CPTII,S$GLB,, | Performed by: STUDENT IN AN ORGANIZED HEALTH CARE EDUCATION/TRAINING PROGRAM

## 2022-02-03 PROCEDURE — 1160F PR REVIEW ALL MEDS BY PRESCRIBER/CLIN PHARMACIST DOCUMENTED: ICD-10-PCS | Mod: CPTII,S$GLB,, | Performed by: STUDENT IN AN ORGANIZED HEALTH CARE EDUCATION/TRAINING PROGRAM

## 2022-02-03 PROCEDURE — 3008F PR BODY MASS INDEX (BMI) DOCUMENTED: ICD-10-PCS | Mod: CPTII,S$GLB,, | Performed by: STUDENT IN AN ORGANIZED HEALTH CARE EDUCATION/TRAINING PROGRAM

## 2022-02-03 PROCEDURE — 3074F SYST BP LT 130 MM HG: CPT | Mod: CPTII,S$GLB,, | Performed by: STUDENT IN AN ORGANIZED HEALTH CARE EDUCATION/TRAINING PROGRAM

## 2022-02-03 PROCEDURE — 1160F RVW MEDS BY RX/DR IN RCRD: CPT | Mod: CPTII,S$GLB,, | Performed by: STUDENT IN AN ORGANIZED HEALTH CARE EDUCATION/TRAINING PROGRAM

## 2022-02-03 RX ORDER — DOXYCYCLINE HYCLATE 100 MG
100 TABLET ORAL 2 TIMES DAILY
Qty: 14 TABLET | Refills: 0 | Status: SHIPPED | OUTPATIENT
Start: 2022-02-03 | End: 2022-02-10

## 2022-02-03 NOTE — PATIENT INSTRUCTIONS
Patient Education       Serous Otitis Media Discharge Instructions   About this topic   The Eustachian tube allows fluid to drain from the middle ear. Sometimes, fluid cannot drain from the tube. This may cause an acute or chronic problem known as serous otitis media. An acute problem is one that does not last for a long time. Acute serous otitis media is often caused by an infection or allergy. A chronic problem is one that lasts for a long time. Chronic serous otitis media may happen when the tube stays blocked because the fluid is too thick to drain from the tube.  This problem may go away on its own without treatment.     What care is needed at home?   · Ask your doctor what you need to do when you go home. Make sure you ask questions if you do not understand what the doctor says. This way you will know what you need to do.  · If the doctor orders antibiotics, be sure to take all of them, even if you start to feel better.  · Do not put anything in your ear unless it was ordered by the doctor.  · You may want to take medicines like ibuprofen, naproxen, or acetaminophen to help with pain.  What follow-up care is needed?   · Your doctor may ask you to make visits to the office to check on your progress. Be sure to keep these visits.  · Have ear and hearing tests done regularly as ordered by your doctor.  What drugs may be needed?   Your doctor may order drugs based on what problems you are having. The doctor may order drugs to:  · Help with pain and swelling  · Fight an infection   · Treat an allergy  Will physical activity be limited?   Talk with your doctor about the right amount of activity for you.  What problems could happen?   · Infection could come back  · Loss of hearing  · Problems with speech  · Scarring on the eardrum  What can be done to prevent this health problem?   · If you smoke, quit. Do not go near people who smoke.  · Stay away from people who have colds.  · If you have allergies, treat them, and  try to avoid your triggers.  · Wash your hands often.  · If over 12 months of age, stop daytime use of a pacifier.  When do I need to call the doctor?   · Your symptoms are not getting better in 2 to 3 days.  · You continue to have problems hearing after 2 to 3 weeks.  · You have a fever of 100.4°F (38°C) or higher or chills.  · You have discharge or fluid coming from your ear.  Teach Back: Helping You Understand   The Teach Back Method helps you understand the information we are giving you. After you talk with the staff, tell them in your own words what you learned. This helps to make sure the staff has described each thing clearly. It also helps to explain things that may have been confusing. Before going home, make sure you can do these:  · I can tell you about my condition.  · I can tell you what may help ease my pain.  · I can tell you what I will do if I have a fever, ear pain, or redness and pain behind my ear.  Where can I learn more?   American Academy of Family Physicians  https://[email or web address]/condition/ear-infection/   American Academy of Family Physicians  https://familydoctor.org/condition/eustachian-tube-dysfunction/   American Academy of Family Physicians  https://familydoctor.org/condition/otitis-media-with-effusion/   Last Reviewed Date   2021-06-21  Consumer Information Use and Disclaimer   This information is not specific medical advice and does not replace information you receive from your health care provider. This is only a brief summary of general information. It does NOT include all information about conditions, illnesses, injuries, tests, procedures, treatments, therapies, discharge instructions or life-style choices that may apply to you. You must talk with your health care provider for complete information about your health and treatment options. This information should not be used to decide whether or not to accept your health care providers advice, instructions or recommendations.  Only your health care provider has the knowledge and training to provide advice that is right for you.  Copyright   Copyright © 2021 [employer] and its affiliates and/or licensors. All rights reserved.

## 2022-02-03 NOTE — PROGRESS NOTES
"Subjective:       Patient ID: Pinky Kate is a 31 y.o. female.    Vitals:  height is 5' 5" (1.651 m) and weight is 127 kg (280 lb).     Chief Complaint: Otalgia    Pt presents for R ear pain. Reports R ear pain/fullness and R sinus pain/pressure ~x10d. Was seen 01/31 and counseled to start tylenol and sudafed, had some mild improvement but has not resolved. Denied f/c, GI sx's, lower respiratory sx's, nasal congestion, sore throat, ear drainage, changes in hearing.    Otalgia   There is pain in the right ear. This is a new problem. The current episode started 1 to 4 weeks ago. The problem occurs constantly. The problem has been unchanged. There has been no fever. The pain is at a severity of 4/10. The pain is moderate. Associated symptoms include headaches. Pertinent negatives include no abdominal pain, coughing, diarrhea, ear discharge, hearing loss, neck pain, rash, rhinorrhea, sore throat or vomiting. She has tried acetaminophen (Sudafed) for the symptoms. The treatment provided mild relief. There is no history of a chronic ear infection, hearing loss or a tympanostomy tube.       Constitution: Negative for chills, fatigue and fever.   HENT: Positive for ear pain, sinus pain and sinus pressure. Negative for ear discharge, tinnitus, hearing loss, congestion, postnasal drip and sore throat.    Neck: Negative for neck pain.   Cardiovascular: Negative.    Eyes: Negative.    Respiratory: Negative.  Negative for cough.    Gastrointestinal: Negative for abdominal pain, vomiting and diarrhea.   Skin: Negative for rash.   Neurological: Positive for headaches.   Psychiatric/Behavioral: Negative for confusion.       Objective:      Physical Exam   Constitutional: She is oriented to person, place, and time. She appears well-developed. She does not appear ill. No distress.   HENT:   Head: Normocephalic and atraumatic.   Ears:   Right Ear: Hearing, external ear and ear canal normal. No mastoid tenderness. Tympanic membrane " is bulging. Tympanic membrane is not perforated, not erythematous and not retracted. A middle ear effusion (serous) is present.   Left Ear: Hearing, tympanic membrane, external ear and ear canal normal.  No middle ear effusion.   Nose: Mucosal edema and rhinorrhea present. Right sinus exhibits maxillary sinus tenderness. Right sinus exhibits no frontal sinus tenderness. Left sinus exhibits no maxillary sinus tenderness and no frontal sinus tenderness.   Eyes: Conjunctivae and EOM are normal. Right eye exhibits no discharge. Left eye exhibits no discharge.   Neck: Neck supple.   Cardiovascular: Normal rate, regular rhythm and normal heart sounds.   Pulmonary/Chest: Effort normal and breath sounds normal. No respiratory distress. She has no wheezes. She has no rhonchi. She has no rales.   Musculoskeletal: Normal range of motion.         General: Normal range of motion.   Lymphadenopathy:        Head (right side): Posterior auricular adenopathy present. No preauricular adenopathy present.        Head (left side): No preauricular and no posterior auricular adenopathy present.     She has no cervical adenopathy.   Neurological: She is alert and oriented to person, place, and time. No cranial nerve deficit (CN II-XII grossly intact).   Skin: Skin is warm, dry and no rash.   Psychiatric: Her behavior is normal. Judgment and thought content normal.   Nursing note and vitals reviewed.        Assessment:       1. Non-recurrent acute serous otitis media of right ear    2. Acute non-recurrent maxillary sinusitis          Plan:         Non-recurrent acute serous otitis media of right ear  - counseled on home care and OTC medications    Acute non-recurrent maxillary sinusitis  -     doxycycline (VIBRA-TABS) 100 MG tablet; Take 1 tablet (100 mg total) by mouth 2 (two) times daily. for 7 days  Dispense: 14 tablet; Refill: 0    Medications and diagnosis reviewed with patient, questions answered, and return precautions  given.    Follow up if symptoms worsen or fail to improve.    Don Monge MD/MPH  [facility]  [facility], [place]

## 2022-03-22 NOTE — PROGRESS NOTES
"  Date:  3/23/2022      Chief Complaint:  I saw Pinky Kate at the [facility] for neuro-ophthalmic evaluation.   She is a 31 y.o. female with a history of obesity, anxiety, and headaches who presents for evaluation of OD optic disc edema and blurred vision.     History:       2013 started having severe headaches, nausea, profuse vomiting, blurred vision OS, with painful eye movements. MRIs at that time reportedly normal. Was told that she had an aneurysm?    11/2021 got COVID 19 shot and had prolonged menstrual bleeding, then started having severe headaches again. Saw an eye doctor who said swelling of right optic disc. Sent to neuro-ophthalmology. Headaches, better with lying down and closing eyes, some tinnitus like a heartbeat, improved with doxycyline, but still there sometimes, not positional.     No diplopia. Vision OD started getting blurry after headaches began in January. No TVOs. No subjective red desaturation. Some right eye pain and puffiness, not with eye movements. Right lip twitching a few weeks ago. No bladder or bowel dysfunction.     No history of clots, but noticed an increase in vaginal blood clotting. History of bleeding during pregnancy but no miscarriages.     Cousin with MS    Dad with cancer    [place] 3/2016:  "Patient she has always had visual problems but most of them started in   2013. She began to experience HA's which progressively got worse. Her   vision would blackout for 10-15 minutes. Subsequently pt has MRI's because   doctors thought she may have had an aneurysm but all tests came back   negative. She was diagnosed with fluid on the brain which was removed   through her spinal cord. Since then her HA's improved. She thinks back   then she was also diagnosed with papilledema. She c/o photosensitivity   form light only from certain angles at that time she can feels the HA's   coming on. Since her last eye exam, even though she did not get the script   filled, she feels " "that her left eye has gotten worse.  On a ton of   medication--including diamox.    Decrease in vision OS since papilledema.    No eye meds at this time  "    12/2013 LP with OP of 27 cm H2O    Mathias 2/2014  "progressively worsening headaches back on 10/31 to Dr. Smith clinic. She was also having progressive vision loss with left more prominent than right. She was started on Diamox and said the symptoms improved. She was scheduled for a spinal tap on 12/6/2013 to rule out possible psuedutumor cerebri. Her opening pressure was 27 so 25cc of CSF was drained. Symptoms improved so patient stopped taking diamox due to unwanted side effects. Says her vision is almost back to normal but still blurry on the left side. Blurriness subsides when patient is exercising. "  ?  Current Outpatient Medications   Medication Sig Dispense Refill    albuterol 90 mcg/actuation inhaler Inhale 2 puffs into the lungs every 6 (six) hours as needed for Wheezing or Shortness of Breath. Rescue 18 g 0    azithromycin (Z-KACI) 250 MG tablet Take 2 tablets by mouth on day 1; Take 1 tablet by mouth on days 2-5 (Patient not taking: Reported on 1/20/2022) 6 tablet 0    BLISOVI FE 1/20, 28, 1 mg-20 mcg (21)/75 mg (7) per tablet TAKE 1 TABLET BY MOUTH ONCE DAILY. (Patient not taking: Reported on 1/20/2022) 28 tablet 0    loratadine (CLARITIN) 10 mg tablet Take 1 tablet (10 mg total) by mouth once daily.  0    LORazepam (ATIVAN) 0.5 MG tablet Take 1 tablet (0.5 mg total) by mouth every 6 (six) hours as needed for Anxiety. 15 tablet 0    promethazine-dextromethorphan (PROMETHAZINE-DM) 6.25-15 mg/5 mL Syrp Take 5 mLs by mouth every 4 to 6 hours as needed. (Patient not taking: Reported on 1/20/2022) 118 mL 0     No current facility-administered medications for this visit.     Review of patient's allergies indicates:   Allergen Reactions    Pcn [penicillins] Swelling     Past Medical History:   Diagnosis Date    Anxiety     Pseudotumor cerebri 2013 "     Past Surgical History:   Procedure Laterality Date     SECTION      x2    TONSILLECTOMY       Family History   Problem Relation Age of Onset    Cancer Father     Diabetes Neg Hx     Eclampsia Neg Hx     Hypertension Neg Hx     Miscarriages / Stillbirths Neg Hx      labor Neg Hx     Stroke Neg Hx      Social History     Socioeconomic History    Marital status: Single   Occupational History     Employer: [employer]   Tobacco Use    Smoking status: Current Some Day Smoker     Types: Cigarettes    Smokeless tobacco: Never Used   Substance and Sexual Activity    Alcohol use: Yes     Comment: occas    Drug use: Yes     Types: Marijuana    Sexual activity: Yes     Partners: Male     Birth control/protection: OCP       ?  Review of Systems:  Detailed review of systems was negative except as noted below.  Endocrine (hormone): Negative  Cardiovascular ( heart/blood vessels): Negative  Fevers/weight loss (constitutional):Negative  Ear, nose, or throat : Negative  Respiratory (lung): Negative  Gastrointestinal (stomach): Negative  Genitourinary (bladder/kidneys): Negative  Musculoskeletal (muscle/bones): Negative  Skin: Negative  Psychiatric: Negative  Hematologic (blood): Negative    Examination:  She was well-appearing. She was alert and oriented. Attention span and concentration were normal. Speech, language, memory, and general knowledge were intact.      Her distance visual acuity without correction was 20/20-1 in the right eye and 20/80  (NIPH) in the left eye. Her near visual acuity without correction was 20/20 in the right eye and 20/200 in the left eye.     She perceived 8/8 OD and 0/8 OS Ishihara color plates correctly. Pupils were brisk to light with a 1.5 log unit Left APD. Ocular ductions were full. Orthophoric in primary, right, and left gaze by cross cover. There was no nystagmus. Saccades and pursuits were normal. Lids were symmetric.     Optic discs OD with circumferential disc  edema with single superior peripapillary hemorrhage, OS flat and pale. Pupillary dilation was not necessary for visualization of the optic disc today.     Her intra ocular pressure was 21 in the right eye and 14 in the left eye at 1030 by applanation.     On the remainder of the neurologic examination, facial sensation was intact. Face was symmetric. Tongue was midline. Strength in the arms and legs was 5/5 without pronator drift. Reflexes were 2+ and symmetric. Finger to nose was intact without dysmetria. Sensation was normal to light touch.     Laboratories Reviewed:     n/a  ?  Neuroimaging Reviewed:     8/2013 CTH wo contrast  Findings: No acute intracranial hemorrhage.  No extra-axial fluid collection. No low attenuation changes to suggest acute infarct. No mass-effect or midline shift.  The ventricles and basal cisterns are within normal limits in size and configuration.       The bony calvarium is intact.  The paranasal sinuses and mastoid air cells are clear.  Impression    1.  No acute intracranial abnormalities seen.       I personally reviewed these images and findings include normal CTH  ?  Ocular Imaging, Photos, Records Reviewed:     OCT RNFL Today 3/23/2022:   Right Eye - Average RNFL 432 global elevation   Left Eye - Average RNFL 70 temporal and inferior thinning     Impression:  Pinky Kate has history of obesity, anxiety, and headaches who presents for evaluation of OD optic disc edema and blurred vision. She reports in 2013 development of headaches and nausea/profuse bilious vomiting and left eye painful vision loss. Reports normal MRI except for possible aneurysm? at that time but these records are not available for review today. She had LP with very mild elevation of opening pressure at that time and improvement of symptoms on short course of diamox. She had period of quiescence until January 2022 when she again developed headaches, nausea, and painful right eye vision loss with periorbital  edema and ?proptosis. Interestingly, leading up to this she had abnormal uterine bleeding (prolonged and with many clots) following COVID 19 vaccination in 11/2021.     Today she has severe circumferential disc edema OD and pale flat disc OS with relatively preserved visual acuity OD. She has bilateral proptosis.     The differential is broad at this time. My suspicion for IIH is low, and I consider it possible that she had an atypical optic neuritis in 2013 in OS. Atypical optic neuritis is possible, also posterior compression from meningioma or less likely cavernous sinus thrombosis, venous sinus thrombosis, orbital inflammation, thyroid eye disease. MRI and MRV brain STAT to evaluate for evidence of any of the aforementioned possibilities. Optic neuropathy labs today.  ?  Plan:  1. Labs  2. MRI head and orbits and MRV brain STAT  3. Follow up with optometry/ophthalmology for yearly routine eye exams and refraction needs    Follow-up:  I will see her in follow-up in 2 weeks or sooner with any change.  ?OCT and HVF  ?  Visit Checklist (as applicable):  1. Status of new and prior symptoms discussed? yes  2. Neuroimaging reviewed/ ordered as appropriate? yes  3. Ocular imaging and photos reviewed/ ordered as appropriate? yes  4. Plan for work-up and treatment discussed with patient? yes  5. Potential medication side-effects and monitoring plan discussed? n/a  6. Review of outside medical records was performed and pertinent details are summarized in the HPI above? yes    Time spent on this encounter: 60 minutes. This includes face to face time and non-face to face time preparing to see the patient (eg, review of tests), obtaining and/or reviewing separately obtained history, documenting clinical information in the electronic or other health record, independently interpreting results and communicating results to the patient/family/caregiver, or care coordinator.      FRANCA Cedeno  Neuro-Ophthalmology  Consultant

## 2022-03-23 ENCOUNTER — LAB VISIT (OUTPATIENT)
Dept: LAB | Facility: HOSPITAL | Age: 32
End: 2022-03-23
Attending: STUDENT IN AN ORGANIZED HEALTH CARE EDUCATION/TRAINING PROGRAM
Payer: MEDICAID

## 2022-03-23 ENCOUNTER — OFFICE VISIT (OUTPATIENT)
Dept: OPHTHALMOLOGY | Facility: CLINIC | Age: 32
End: 2022-03-23
Payer: MEDICAID

## 2022-03-23 DIAGNOSIS — H53.15 VISUAL DISTORTIONS OF SHAPE AND SIZE: Primary | ICD-10-CM

## 2022-03-23 DIAGNOSIS — H54.7 UNSPECIFIED VISUAL LOSS: ICD-10-CM

## 2022-03-23 DIAGNOSIS — H46.9 UNSPECIFIED OPTIC NEURITIS: ICD-10-CM

## 2022-03-23 DIAGNOSIS — H93.A9 PULSATILE TINNITUS, UNSPECIFIED EAR: ICD-10-CM

## 2022-03-23 DIAGNOSIS — G08 DURAL VENOUS SINUS THROMBOSIS: ICD-10-CM

## 2022-03-23 DIAGNOSIS — R51.9 NONINTRACTABLE HEADACHE, UNSPECIFIED CHRONICITY PATTERN, UNSPECIFIED HEADACHE TYPE: ICD-10-CM

## 2022-03-23 LAB
BASOPHILS # BLD AUTO: 0.03 K/UL (ref 0–0.2)
BASOPHILS NFR BLD: 0.5 % (ref 0–1.9)
CRP SERPL-MCNC: 4.4 MG/L (ref 0–8.2)
DIFFERENTIAL METHOD: ABNORMAL
EOSINOPHIL # BLD AUTO: 0.3 K/UL (ref 0–0.5)
EOSINOPHIL NFR BLD: 4.5 % (ref 0–8)
ERYTHROCYTE [DISTWIDTH] IN BLOOD BY AUTOMATED COUNT: 16.9 % (ref 11.5–14.5)
ERYTHROCYTE [SEDIMENTATION RATE] IN BLOOD BY WESTERGREN METHOD: 38 MM/HR (ref 0–36)
HCT VFR BLD AUTO: 33.1 % (ref 37–48.5)
HGB BLD-MCNC: 10.2 G/DL (ref 12–16)
IMM GRANULOCYTES # BLD AUTO: 0.01 K/UL (ref 0–0.04)
IMM GRANULOCYTES NFR BLD AUTO: 0.2 % (ref 0–0.5)
LYMPHOCYTES # BLD AUTO: 2.4 K/UL (ref 1–4.8)
LYMPHOCYTES NFR BLD: 40.2 % (ref 18–48)
MCH RBC QN AUTO: 22.8 PG (ref 27–31)
MCHC RBC AUTO-ENTMCNC: 30.8 G/DL (ref 32–36)
MCV RBC AUTO: 74 FL (ref 82–98)
MONOCYTES # BLD AUTO: 0.3 K/UL (ref 0.3–1)
MONOCYTES NFR BLD: 4.5 % (ref 4–15)
NEUTROPHILS # BLD AUTO: 3 K/UL (ref 1.8–7.7)
NEUTROPHILS NFR BLD: 50.1 % (ref 38–73)
NRBC BLD-RTO: 0 /100 WBC
PLATELET # BLD AUTO: 330 K/UL (ref 150–450)
PMV BLD AUTO: 9.9 FL (ref 9.2–12.9)
RBC # BLD AUTO: 4.47 M/UL (ref 4–5.4)
T4 FREE SERPL-MCNC: 0.96 NG/DL (ref 0.71–1.51)
THYROPEROXIDASE IGG SERPL-ACNC: <6 IU/ML
TSH SERPL DL<=0.005 MIU/L-ACNC: 1.64 UIU/ML (ref 0.4–4)
WBC # BLD AUTO: 5.97 K/UL (ref 3.9–12.7)

## 2022-03-23 PROCEDURE — 86592 SYPHILIS TEST NON-TREP QUAL: CPT | Performed by: STUDENT IN AN ORGANIZED HEALTH CARE EDUCATION/TRAINING PROGRAM

## 2022-03-23 PROCEDURE — 99205 OFFICE O/P NEW HI 60 MIN: CPT | Mod: S$PBB,,, | Performed by: STUDENT IN AN ORGANIZED HEALTH CARE EDUCATION/TRAINING PROGRAM

## 2022-03-23 PROCEDURE — 99999 PR PBB SHADOW E&M-EST. PATIENT-LVL II: CPT | Mod: PBBFAC,,, | Performed by: STUDENT IN AN ORGANIZED HEALTH CARE EDUCATION/TRAINING PROGRAM

## 2022-03-23 PROCEDURE — 84443 ASSAY THYROID STIM HORMONE: CPT | Performed by: STUDENT IN AN ORGANIZED HEALTH CARE EDUCATION/TRAINING PROGRAM

## 2022-03-23 PROCEDURE — 84439 ASSAY OF FREE THYROXINE: CPT | Performed by: STUDENT IN AN ORGANIZED HEALTH CARE EDUCATION/TRAINING PROGRAM

## 2022-03-23 PROCEDURE — 86376 MICROSOMAL ANTIBODY EACH: CPT | Performed by: STUDENT IN AN ORGANIZED HEALTH CARE EDUCATION/TRAINING PROGRAM

## 2022-03-23 PROCEDURE — 86235 NUCLEAR ANTIGEN ANTIBODY: CPT | Mod: 59 | Performed by: STUDENT IN AN ORGANIZED HEALTH CARE EDUCATION/TRAINING PROGRAM

## 2022-03-23 PROCEDURE — 86235 NUCLEAR ANTIGEN ANTIBODY: CPT | Performed by: STUDENT IN AN ORGANIZED HEALTH CARE EDUCATION/TRAINING PROGRAM

## 2022-03-23 PROCEDURE — 82787 IGG 1 2 3 OR 4 EACH: CPT | Performed by: STUDENT IN AN ORGANIZED HEALTH CARE EDUCATION/TRAINING PROGRAM

## 2022-03-23 PROCEDURE — 99999 PR PBB SHADOW E&M-EST. PATIENT-LVL II: ICD-10-PCS | Mod: PBBFAC,,, | Performed by: STUDENT IN AN ORGANIZED HEALTH CARE EDUCATION/TRAINING PROGRAM

## 2022-03-23 PROCEDURE — 86039 ANTINUCLEAR ANTIBODIES (ANA): CPT | Performed by: STUDENT IN AN ORGANIZED HEALTH CARE EDUCATION/TRAINING PROGRAM

## 2022-03-23 PROCEDURE — 1160F PR REVIEW ALL MEDS BY PRESCRIBER/CLIN PHARMACIST DOCUMENTED: ICD-10-PCS | Mod: CPTII,,, | Performed by: STUDENT IN AN ORGANIZED HEALTH CARE EDUCATION/TRAINING PROGRAM

## 2022-03-23 PROCEDURE — 86140 C-REACTIVE PROTEIN: CPT | Performed by: STUDENT IN AN ORGANIZED HEALTH CARE EDUCATION/TRAINING PROGRAM

## 2022-03-23 PROCEDURE — 86255 FLUORESCENT ANTIBODY SCREEN: CPT | Performed by: STUDENT IN AN ORGANIZED HEALTH CARE EDUCATION/TRAINING PROGRAM

## 2022-03-23 PROCEDURE — 84445 ASSAY OF TSI GLOBULIN: CPT | Performed by: STUDENT IN AN ORGANIZED HEALTH CARE EDUCATION/TRAINING PROGRAM

## 2022-03-23 PROCEDURE — 86611 BARTONELLA ANTIBODY: CPT | Mod: 91 | Performed by: STUDENT IN AN ORGANIZED HEALTH CARE EDUCATION/TRAINING PROGRAM

## 2022-03-23 PROCEDURE — 99205 PR OFFICE/OUTPT VISIT, NEW, LEVL V, 60-74 MIN: ICD-10-PCS | Mod: S$PBB,,, | Performed by: STUDENT IN AN ORGANIZED HEALTH CARE EDUCATION/TRAINING PROGRAM

## 2022-03-23 PROCEDURE — 1159F PR MEDICATION LIST DOCUMENTED IN MEDICAL RECORD: ICD-10-PCS | Mod: CPTII,,, | Performed by: STUDENT IN AN ORGANIZED HEALTH CARE EDUCATION/TRAINING PROGRAM

## 2022-03-23 PROCEDURE — 99212 OFFICE O/P EST SF 10 MIN: CPT | Mod: PBBFAC | Performed by: STUDENT IN AN ORGANIZED HEALTH CARE EDUCATION/TRAINING PROGRAM

## 2022-03-23 PROCEDURE — 92133 POSTERIOR SEGMENT OCT OPTIC NERVE(OCULAR COHERENCE TOMOGRAPHY) - OU - BOTH EYES: ICD-10-PCS | Mod: 26,S$PBB,, | Performed by: STUDENT IN AN ORGANIZED HEALTH CARE EDUCATION/TRAINING PROGRAM

## 2022-03-23 PROCEDURE — 1159F MED LIST DOCD IN RCRD: CPT | Mod: CPTII,,, | Performed by: STUDENT IN AN ORGANIZED HEALTH CARE EDUCATION/TRAINING PROGRAM

## 2022-03-23 PROCEDURE — 87389 HIV-1 AG W/HIV-1&-2 AB AG IA: CPT | Performed by: STUDENT IN AN ORGANIZED HEALTH CARE EDUCATION/TRAINING PROGRAM

## 2022-03-23 PROCEDURE — 92133 CPTRZD OPH DX IMG PST SGM ON: CPT | Mod: PBBFAC | Performed by: STUDENT IN AN ORGANIZED HEALTH CARE EDUCATION/TRAINING PROGRAM

## 2022-03-23 PROCEDURE — 82164 ANGIOTENSIN I ENZYME TEST: CPT | Performed by: STUDENT IN AN ORGANIZED HEALTH CARE EDUCATION/TRAINING PROGRAM

## 2022-03-23 PROCEDURE — 1160F RVW MEDS BY RX/DR IN RCRD: CPT | Mod: CPTII,,, | Performed by: STUDENT IN AN ORGANIZED HEALTH CARE EDUCATION/TRAINING PROGRAM

## 2022-03-23 PROCEDURE — 85025 COMPLETE CBC W/AUTO DIFF WBC: CPT | Performed by: STUDENT IN AN ORGANIZED HEALTH CARE EDUCATION/TRAINING PROGRAM

## 2022-03-23 PROCEDURE — 83520 IMMUNOASSAY QUANT NOS NONAB: CPT | Performed by: STUDENT IN AN ORGANIZED HEALTH CARE EDUCATION/TRAINING PROGRAM

## 2022-03-23 PROCEDURE — 85652 RBC SED RATE AUTOMATED: CPT | Performed by: STUDENT IN AN ORGANIZED HEALTH CARE EDUCATION/TRAINING PROGRAM

## 2022-03-23 PROCEDURE — 86038 ANTINUCLEAR ANTIBODIES: CPT | Performed by: STUDENT IN AN ORGANIZED HEALTH CARE EDUCATION/TRAINING PROGRAM

## 2022-03-24 ENCOUNTER — HOSPITAL ENCOUNTER (OUTPATIENT)
Dept: RADIOLOGY | Facility: HOSPITAL | Age: 32
Discharge: HOME | End: 2022-03-24
Attending: STUDENT IN AN ORGANIZED HEALTH CARE EDUCATION/TRAINING PROGRAM
Payer: MEDICAID

## 2022-03-24 DIAGNOSIS — G08 DURAL VENOUS SINUS THROMBOSIS: ICD-10-CM

## 2022-03-24 DIAGNOSIS — H54.7 UNSPECIFIED VISUAL LOSS: ICD-10-CM

## 2022-03-24 DIAGNOSIS — H46.9 UNSPECIFIED OPTIC NEURITIS: ICD-10-CM

## 2022-03-24 DIAGNOSIS — R51.9 NONINTRACTABLE HEADACHE, UNSPECIFIED CHRONICITY PATTERN, UNSPECIFIED HEADACHE TYPE: ICD-10-CM

## 2022-03-24 DIAGNOSIS — H93.A9 PULSATILE TINNITUS, UNSPECIFIED EAR: ICD-10-CM

## 2022-03-24 LAB
ACE SERPL-CCNC: 24 U/L (ref 16–85)
ANA PATTERN 1: NORMAL
ANA SER QL IF: POSITIVE
ANA TITR SER IF: NORMAL {TITER}
B HENSELAE IGG TITR SER IF: NORMAL TITER
B HENSELAE IGM TITR SER IF: NORMAL TITER
B QUINTANA IGG TITR SER IF: NORMAL TITER
B QUINTANA IGM TITR SER IF: NORMAL TITER
HIV 1+2 AB+HIV1 P24 AG SERPL QL IA: NEGATIVE
RPR SER QL: NORMAL
TSH RECEP AB SER-ACNC: <1.1 IU/L (ref 0–1.75)

## 2022-03-24 PROCEDURE — 25500020 PHARM REV CODE 255: Performed by: STUDENT IN AN ORGANIZED HEALTH CARE EDUCATION/TRAINING PROGRAM

## 2022-03-24 PROCEDURE — A9585 GADOBUTROL INJECTION: HCPCS | Performed by: STUDENT IN AN ORGANIZED HEALTH CARE EDUCATION/TRAINING PROGRAM

## 2022-03-24 PROCEDURE — 70544 MR ANGIOGRAPHY HEAD W/O DYE: CPT | Mod: 59,TC

## 2022-03-24 PROCEDURE — 70543 MRI ORBT/FAC/NCK W/O &W/DYE: CPT | Mod: 26,,, | Performed by: RADIOLOGY

## 2022-03-24 PROCEDURE — 70544 MR ANGIOGRAPHY HEAD W/O DYE: CPT | Mod: 26,59,, | Performed by: RADIOLOGY

## 2022-03-24 PROCEDURE — 70553 MRI HEAD-ORBITS W/WO CONTRAST (XPD): ICD-10-PCS | Mod: 26,,, | Performed by: RADIOLOGY

## 2022-03-24 PROCEDURE — 70543 MRI ORBT/FAC/NCK W/O &W/DYE: CPT | Mod: TC

## 2022-03-24 PROCEDURE — 70553 MRI BRAIN STEM W/O & W/DYE: CPT | Mod: 26,,, | Performed by: RADIOLOGY

## 2022-03-24 PROCEDURE — 70544 MRV BRAIN WITHOUT CONTRAST: ICD-10-PCS | Mod: 26,59,, | Performed by: RADIOLOGY

## 2022-03-24 PROCEDURE — 70553 MRI BRAIN STEM W/O & W/DYE: CPT

## 2022-03-24 PROCEDURE — 70543 MRI HEAD-ORBITS W/WO CONTRAST (XPD): ICD-10-PCS | Mod: 26,,, | Performed by: RADIOLOGY

## 2022-03-24 RX ORDER — GADOBUTROL 604.72 MG/ML
10 INJECTION INTRAVENOUS
Status: COMPLETED | OUTPATIENT
Start: 2022-03-24 | End: 2022-03-24

## 2022-03-24 RX ADMIN — GADOBUTROL 10 ML: 604.72 INJECTION INTRAVENOUS at 07:03

## 2022-03-25 LAB
ANTI SM ANTIBODY: 0.24 RATIO (ref 0–0.99)
ANTI SM/RNP ANTIBODY: 0.44 RATIO (ref 0–0.99)
ANTI-SM INTERPRETATION: NEGATIVE
ANTI-SM/RNP INTERPRETATION: NEGATIVE
ANTI-SSA ANTIBODY: 0.16 RATIO (ref 0–0.99)
ANTI-SSA ANTIBODY: 0.16 RATIO (ref 0–0.99)
ANTI-SSA INTERPRETATION: NEGATIVE
ANTI-SSA INTERPRETATION: NEGATIVE
ANTI-SSB ANTIBODY: 0.07 RATIO (ref 0–0.99)
ANTI-SSB ANTIBODY: 0.07 RATIO (ref 0–0.99)
ANTI-SSB INTERPRETATION: NEGATIVE
ANTI-SSB INTERPRETATION: NEGATIVE
DSDNA AB SER-ACNC: NORMAL [IU]/ML

## 2022-03-25 RX ORDER — ACETAZOLAMIDE 500 MG/1
500 CAPSULE, EXTENDED RELEASE ORAL 2 TIMES DAILY
Qty: 60 CAPSULE | Refills: 11 | Status: SHIPPED | OUTPATIENT
Start: 2022-03-25 | End: 2023-05-04

## 2022-03-28 LAB
ANCA AB TITR SER IF: NORMAL TITER
IGG4 SER-MCNC: 13 MG/DL (ref 4–86)
P-ANCA TITR SER IF: NORMAL TITER
TSI SER-ACNC: <0.1 IU/L

## 2022-03-29 LAB
CNS DEMYELINATING DISEASE EVAL: NORMAL
MOG-IGG1: NEGATIVE
NMO/AQP4 FACS,S: NEGATIVE

## 2022-04-06 NOTE — PROGRESS NOTES
"    Date:  4/7/2022      Chief Complaint:  I saw Pinky Kate at the [facility] for neuro-ophthalmic evaluation.   She is a 31 y.o. female with a history of obesity, anxiety, and headaches who presents for evaluation of OD optic disc edema and blurred vision.     History:       2013 started having severe headaches, nausea, profuse vomiting, blurred vision OS, with painful eye movements. MRIs at that time reportedly normal. Was told that she had an aneurysm?    11/2021 got COVID 19 shot and had prolonged menstrual bleeding, then started having severe headaches again. Saw an eye doctor who said swelling of right optic disc. Sent to neuro-ophthalmology. Headaches, better with lying down and closing eyes, some tinnitus like a heartbeat, improved with prednisone, but still there sometimes, not positional.     No diplopia. Vision OD started getting blurry after headaches began in January. No TVOs. No subjective red desaturation. Some right eye pain and puffiness, not with eye movements. Right lip twitching a few weeks ago. No bladder or bowel dysfunction.     No history of clots, but noticed an increase in vaginal blood clotting. History of bleeding during pregnancy but no miscarriages.     Cousin with MS    Dad with cancer    Hyman 3/2016:  "Patient she has always had visual problems but most of them started in   2013. She began to experience HA's which progressively got worse. Her   vision would blackout for 10-15 minutes. Subsequently pt has MRI's because   doctors thought she may have had an aneurysm but all tests came back   negative. She was diagnosed with fluid on the brain which was removed   through her spinal cord. Since then her HA's improved. She thinks back   then she was also diagnosed with papilledema. She c/o photosensitivity   form light only from certain angles at that time she can feels the HA's   coming on. Since her last eye exam, even though she did not get the script   filled, she feels " "that her left eye has gotten worse.  On a ton of   medication--including diamox.    Decrease in vision OS since papilledema.    No eye meds at this time  "    2013 LP with OP of 27 cm H2O    Stew 2014  "progressively worsening headaches back on 10/31 to Dr. Smith clinic. She was also having progressive vision loss with left more prominent than right. She was started on Diamox and said the symptoms improved. She was scheduled for a spinal tap on 2013 to rule out possible psuedutumor cerebri. Her opening pressure was 27 so 25cc of CSF was drained. Symptoms improved so patient stopped taking diamox due to unwanted side effects. Says her vision is almost back to normal but still blurry on the left side. Blurriness subsides when patient is exercising. "     Interval History: 2022    Since last visit had MRI head and orbits with stigmata of elevated ICP, no optic neuritis. EMORY positive, ESR mildly elevated, negative demyelinating panel. She did not start acetazolamide yet. Weight has been up and down, no significant weight loss since last visit.   ?  Current Outpatient Medications   Medication Sig Dispense Refill    acetaZOLAMIDE (DIAMOX) 500 mg CpSR Take 1 capsule (500 mg total) by mouth 2 (two) times daily. 60 capsule 11     No current facility-administered medications for this visit.     Review of patient's allergies indicates:   Allergen Reactions    Pcn [penicillins] Swelling     Past Medical History:   Diagnosis Date    Anxiety     Pseudotumor cerebri      Past Surgical History:   Procedure Laterality Date     SECTION      x2    TONSILLECTOMY       Family History   Problem Relation Age of Onset    Cancer Father     Diabetes Neg Hx     Eclampsia Neg Hx     Hypertension Neg Hx     Miscarriages / Stillbirths Neg Hx      labor Neg Hx     Stroke Neg Hx      Social History     Socioeconomic History    Marital status: Single   Occupational History     Employer: [employer] "   Tobacco Use    Smoking status: Current Some Day Smoker     Types: Cigarettes    Smokeless tobacco: Never Used   Substance and Sexual Activity    Alcohol use: Yes     Comment: occas    Drug use: Yes     Types: Marijuana    Sexual activity: Yes     Partners: Male     Birth control/protection: OCP       ?  Review of Systems:  Detailed review of systems was negative except as noted below.  Endocrine (hormone): Negative  Cardiovascular ( heart/blood vessels): Negative  Fevers/weight loss (constitutional):Negative  Ear, nose, or throat : Negative  Respiratory (lung): Negative  Gastrointestinal (stomach): Negative  Genitourinary (bladder/kidneys): Negative  Musculoskeletal (muscle/bones): Negative  Skin: Negative  Psychiatric: Negative  Hematologic (blood): Negative    Examination:  She was well-appearing. She was alert and oriented. Attention span and concentration were normal. Speech, language, memory, and general knowledge were intact.      Her distance visual acuity without correction was 20/25  (NPH) in the right eye and 20/60  (NIPH) in the left eye. Her near visual acuity without correction was   in the right eye and   in the left eye.      Pupils were brisk to light with no APD, quicker rebound from constriction OS though. Ocular ductions were full. Orthophoric in primary, right, and left gaze by cross cover. There was no nystagmus. Saccades and pursuits were normal. Lids were symmetric.     Optic discs OD with circumferential disc edema without peripapillary hemorrhage, OS flat and pale. Pupillary dilation was not necessary for visualization of the optic disc today.     Her intra ocular pressure was 14 in the right eye and 14 in the left eye at 1120 by applanation.     Prior: On the remainder of the neurologic examination, facial sensation was intact. Face was symmetric. Tongue was midline. Strength in the arms and legs was 5/5 without pronator drift. Reflexes were 2+ and symmetric. Finger to nose was  intact without dysmetria. Sensation was normal to light touch.     Laboratories Reviewed:      Latest Reference Range & Units 03/23/22 12:05 03/23/22 12:15   Sed Rate 0 - 36 mm/Hr 38 (H)    CRP 0.0 - 8.2 mg/L  4.4   Angio Convert Enzyme 16 - 85 U/L  24 [1]   TSH 0.400 - 4.000 uIU/mL  1.637   Free T4 0.71 - 1.51 ng/dL  0.96   Thyrotropin Receptor Ab 0.00 - 1.75 IU/L <1.10 [2]    Thyroperoxidase Antibodies <6.0 IU/mL <6.0    Thyroid-Stim IG Quantitative <0.10 IU/L <0.10 [3]    EMORY Screen Negative <1:80   Positive ! [4]   EMORY Titer 1   1:80   EMORY PATTERN 1   Speckled   Ds DNA Ab Negative 1:10   Negative 1:10 [5]   Anti-SSA Antibody 0.00 - 0.99 Ratio  0.00 - 0.99 Ratio  0.16  0.16   Anti-SSA Interpretation Negative   Negative   Negative  Negative   Anti-SSB Antibody 0.00 - 0.99 Ratio  0.00 - 0.99 Ratio  0.07  0.07   Anti-SSB Interpretation Negative   Negative   Negative  Negative   Anti Sm Antibody 0.00 - 0.99 Ratio  0.24   Anti-Sm Interpretation Negative   Negative   Anti Sm/RNP Antibody 0.00 - 0.99 Ratio  0.44   Anti-Sm/RNP Interpretation Negative   Negative   CNS Demyelinating Disease Eval  SEE BELOW [6]    Cytoplasmic Neutrophilic Ab <1:20 Titer  <1:20 [7]   MOG-IgG1 Negative  Negative [8]    Perinuclear (P-ANCA) <1:20 Titer  <1:20   IgG 4 4 - 86 mg/dL  13 [9]   NMO/AQP4 FACS,S Negative  Negative [10]    B haskins IgG <1:128 titer  <1:128   B Haskins IgM <1:20 titer  <1:20 [11]   Mitogen - Nil IU/mL  9.973   NIL IU/mL  0.69901 [12]   TB Gold Plus Negative   Negative [13]   TB1 - Nil IU/mL  0.003   TB2 - Nil IU/mL  -0.004   HIV 1/2 Ag/Ab Negative   Negative   RPR Non-reactive  Non-reactive    B. henselae IgG <1:128 titer  <1:128   B. henselae IgM <1:20 titer  <1:20       Neuroimaging Reviewed:     8/2013 CTH wo contrast  Findings: No acute intracranial hemorrhage.  No extra-axial fluid collection. No low attenuation changes to suggest acute infarct. No mass-effect or midline shift.  The ventricles and basal  cisterns are within normal limits in size and configuration.       The bony calvarium is intact.  The paranasal sinuses and mastoid air cells are clear.  Impression    1.  No acute intracranial abnormalities seen.         3/24/2022 MRI head and orbits w/wo contrast and MRV brain wo contrast  Impression:     Empty sella configuration, mild increased edema in the optic nerve sheaths bilaterally and mild flattening of the posterior sclera, and narrowing at the right transverse and sigmoid sinuses with associated prominent arachnoid granulation.  Constellation of findings can be seen in the setting of idiopathic intracranial hypertension.  Correlation is advised.  Consider consultation with interventional neuroradiology, if clinically indicated.     Mild asymmetric increased T2 signal in the left optic nerve as compared to the right is best appreciated on T2 coronal imaging.  This finding can be seen with optic neuritis of the left optic nerve.  No abnormal enhancement of the optic nerves.  Enhancement is typically seen in greater than 90% of acute cases of optic neuritis in the 1st 2-3 weeks.    ?  Ocular Imaging, Photos, Records Reviewed:     OCT RNFL 3/23/2022:   Right Eye - Average RNFL 432 global elevation   Left Eye - Average RNFL 70 temporal and inferior thinning     OCT RNFL Today 4/7/2022:   Right Eye - Average RNFL 259 S, I, T elevation, improved from prior   Left Eye - Average RNFL 55      Macular architecture normal OU    Visual Field Test 24-2 OU Today 4/7/2022: Right Eye - fixation losses 0/11, false positives 4%, false negatives 0%, MD -1.94dB, Impression OD: BSE. Left Eye - fixation losses 3/13, false positives 0%, false negatives 16%, MD -18.85dB, Impression OS: dense superior depression and mild inferior depression.      Impression:  Pinky Kate has history of obesity, anxiety, and headaches who presents for evaluation of OD optic disc edema and blurred vision. She reports in 2013 development of  headaches and nausea/profuse bilious vomiting and left eye painful vision loss. Reports normal MRI except for possible aneurysm? at that time but these records are not available for review today. She had LP with very mild elevation of opening pressure at that time and improvement of symptoms on short course of diamox. She had period of quiescence until January 2022 when she again developed headaches, nausea, and painful right eye vision loss with periorbital edema and ?proptosis. Interestingly, leading up to this she had abnormal uterine bleeding (prolonged and with many clots) following COVID 19 vaccination in 11/2021.     Today she has severe circumferential disc edema OD and pale flat disc OS with relatively preserved visual acuity OD. She has bilateral proptosis.     The differential is broad at this time. My suspicion for IIH is low, and I consider it possible that she had an atypical optic neuritis in 2013 in OS. Atypical optic neuritis is possible, also posterior compression from meningioma or less likely cavernous sinus thrombosis, venous sinus thrombosis, orbital inflammation, thyroid eye disease. MRI and MRV brain STAT to evaluate for evidence of any of the aforementioned possibilities. Optic neuropathy labs today.    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 Demyelinating panel negative. Has had some interval improvement in optic disc edema without acetazolamide. Will treat as IIH and monitor for improvement, but will refer to neuroimmunology as well for further input.   ?  Plan:  1. Neuroimmunology  2. Acetazolamide 500 mg BID  3. Follow up with optometry/ophthalmology for yearly routine eye exams and refraction needs    Follow-up:  I will see her in follow-up in 8 weeks or sooner with any change.  ?OCT and HVF  ?  Visit Checklist (as applicable):  1. Status of new and prior  symptoms discussed? yes  2. Neuroimaging reviewed/ ordered as appropriate? yes  3. Ocular imaging and photos reviewed/ ordered as appropriate? yes  4. Plan for work-up and treatment discussed with patient? yes  5. Potential medication side-effects and monitoring plan discussed? yes  6. Review of outside medical records was performed and pertinent details are summarized in the HPI above? yes    Time spent on this encounter: 45 minutes. This includes face to face time and non-face to face time preparing to see the patient (eg, review of tests), obtaining and/or reviewing separately obtained history, documenting clinical information in the electronic or other health record, independently interpreting results and communicating results to the patient/family/caregiver, or care coordinator.      FRANCA Cedeno  Neuro-Ophthalmology Consultant

## 2022-04-07 ENCOUNTER — CLINICAL SUPPORT (OUTPATIENT)
Dept: OPHTHALMOLOGY | Facility: CLINIC | Age: 32
End: 2022-04-07
Payer: MEDICAID

## 2022-04-07 ENCOUNTER — OFFICE VISIT (OUTPATIENT)
Dept: OPHTHALMOLOGY | Facility: CLINIC | Age: 32
End: 2022-04-07
Payer: MEDICAID

## 2022-04-07 DIAGNOSIS — H53.15 VISUAL DISTORTIONS OF SHAPE AND SIZE: ICD-10-CM

## 2022-04-07 DIAGNOSIS — H46.9 RIGHT OPTIC NEURITIS: ICD-10-CM

## 2022-04-07 DIAGNOSIS — H47.10 PAPILLEDEMA: Primary | ICD-10-CM

## 2022-04-07 DIAGNOSIS — E23.6 EMPTY SELLA: ICD-10-CM

## 2022-04-07 DIAGNOSIS — E66.9 OBESITY, UNSPECIFIED CLASSIFICATION, UNSPECIFIED OBESITY TYPE, UNSPECIFIED WHETHER SERIOUS COMORBIDITY PRESENT: ICD-10-CM

## 2022-04-07 PROCEDURE — 1159F PR MEDICATION LIST DOCUMENTED IN MEDICAL RECORD: ICD-10-PCS | Mod: CPTII,,, | Performed by: STUDENT IN AN ORGANIZED HEALTH CARE EDUCATION/TRAINING PROGRAM

## 2022-04-07 PROCEDURE — 99215 PR OFFICE/OUTPT VISIT, EST, LEVL V, 40-54 MIN: ICD-10-PCS | Mod: S$PBB,,, | Performed by: STUDENT IN AN ORGANIZED HEALTH CARE EDUCATION/TRAINING PROGRAM

## 2022-04-07 PROCEDURE — 99213 OFFICE O/P EST LOW 20 MIN: CPT | Mod: PBBFAC | Performed by: STUDENT IN AN ORGANIZED HEALTH CARE EDUCATION/TRAINING PROGRAM

## 2022-04-07 PROCEDURE — 1160F PR REVIEW ALL MEDS BY PRESCRIBER/CLIN PHARMACIST DOCUMENTED: ICD-10-PCS | Mod: CPTII,,, | Performed by: STUDENT IN AN ORGANIZED HEALTH CARE EDUCATION/TRAINING PROGRAM

## 2022-04-07 PROCEDURE — 1159F MED LIST DOCD IN RCRD: CPT | Mod: CPTII,,, | Performed by: STUDENT IN AN ORGANIZED HEALTH CARE EDUCATION/TRAINING PROGRAM

## 2022-04-07 PROCEDURE — 1160F RVW MEDS BY RX/DR IN RCRD: CPT | Mod: CPTII,,, | Performed by: STUDENT IN AN ORGANIZED HEALTH CARE EDUCATION/TRAINING PROGRAM

## 2022-04-07 PROCEDURE — 99999 PR PBB SHADOW E&M-EST. PATIENT-LVL III: ICD-10-PCS | Mod: PBBFAC,,, | Performed by: STUDENT IN AN ORGANIZED HEALTH CARE EDUCATION/TRAINING PROGRAM

## 2022-04-07 PROCEDURE — 99999 PR PBB SHADOW E&M-EST. PATIENT-LVL III: CPT | Mod: PBBFAC,,, | Performed by: STUDENT IN AN ORGANIZED HEALTH CARE EDUCATION/TRAINING PROGRAM

## 2022-04-07 PROCEDURE — 99215 OFFICE O/P EST HI 40 MIN: CPT | Mod: S$PBB,,, | Performed by: STUDENT IN AN ORGANIZED HEALTH CARE EDUCATION/TRAINING PROGRAM

## 2022-04-07 PROCEDURE — 92083 HUMPHREY VISUAL FIELD - OU - BOTH EYES: ICD-10-PCS | Mod: 26,S$PBB,, | Performed by: STUDENT IN AN ORGANIZED HEALTH CARE EDUCATION/TRAINING PROGRAM

## 2022-04-07 PROCEDURE — 92083 EXTENDED VISUAL FIELD XM: CPT | Mod: PBBFAC | Performed by: STUDENT IN AN ORGANIZED HEALTH CARE EDUCATION/TRAINING PROGRAM

## 2022-04-07 NOTE — PROGRESS NOTES
.        Visual field test done.  Patient stated no latex allergies used coverlet            No mrx  Patient Name: Pinky Kate  MRN: [id number] Date: 4/7/2022        Glasses Prescription     Sphere Cylinder   Right +1.00 Sphere   Left +2.25 Sphere   Type: SVL   Expiration Date: 3/8/2017        Ordered, Authorized, and Signed

## 2022-04-26 ENCOUNTER — TELEPHONE (OUTPATIENT)
Dept: NEUROLOGY | Facility: CLINIC | Age: 32
End: 2022-04-26

## 2022-04-26 NOTE — TELEPHONE ENCOUNTER
Spoke with patient and scheduled her to see Eugene on 5/9. Offered sooner appt but patient declined due to work schedule.

## 2022-05-02 ENCOUNTER — TELEPHONE (OUTPATIENT)
Dept: NEUROLOGY | Facility: CLINIC | Age: 32
End: 2022-05-02
Payer: MEDICAID

## 2022-05-02 NOTE — TELEPHONE ENCOUNTER
----- Message from Dani Cheema sent at 5/2/2022 11:42 AM CDT -----  Contact: @ [phone]  Patient calling to re-schedule the 5-9th appointment to a AM on 5-11th 12th or 13th,  please call to advise

## 2022-05-13 ENCOUNTER — OFFICE VISIT (OUTPATIENT)
Dept: NEUROLOGY | Facility: CLINIC | Age: 32
End: 2022-05-13
Payer: MEDICAID

## 2022-05-13 VITALS
WEIGHT: 280 LBS | DIASTOLIC BLOOD PRESSURE: 74 MMHG | SYSTOLIC BLOOD PRESSURE: 129 MMHG | HEIGHT: 65 IN | HEART RATE: 79 BPM | BODY MASS INDEX: 46.65 KG/M2

## 2022-05-13 DIAGNOSIS — H47.10 PAPILLEDEMA: ICD-10-CM

## 2022-05-13 PROCEDURE — 1159F MED LIST DOCD IN RCRD: CPT | Mod: CPTII,,, | Performed by: STUDENT IN AN ORGANIZED HEALTH CARE EDUCATION/TRAINING PROGRAM

## 2022-05-13 PROCEDURE — 99214 PR OFFICE/OUTPT VISIT, EST, LEVL IV, 30-39 MIN: ICD-10-PCS | Mod: S$PBB,,, | Performed by: STUDENT IN AN ORGANIZED HEALTH CARE EDUCATION/TRAINING PROGRAM

## 2022-05-13 PROCEDURE — 99213 OFFICE O/P EST LOW 20 MIN: CPT | Mod: PBBFAC | Performed by: STUDENT IN AN ORGANIZED HEALTH CARE EDUCATION/TRAINING PROGRAM

## 2022-05-13 PROCEDURE — 3078F DIAST BP <80 MM HG: CPT | Mod: CPTII,,, | Performed by: STUDENT IN AN ORGANIZED HEALTH CARE EDUCATION/TRAINING PROGRAM

## 2022-05-13 PROCEDURE — 99999 PR PBB SHADOW E&M-EST. PATIENT-LVL III: ICD-10-PCS | Mod: PBBFAC,,, | Performed by: STUDENT IN AN ORGANIZED HEALTH CARE EDUCATION/TRAINING PROGRAM

## 2022-05-13 PROCEDURE — 1159F PR MEDICATION LIST DOCUMENTED IN MEDICAL RECORD: ICD-10-PCS | Mod: CPTII,,, | Performed by: STUDENT IN AN ORGANIZED HEALTH CARE EDUCATION/TRAINING PROGRAM

## 2022-05-13 PROCEDURE — 3074F SYST BP LT 130 MM HG: CPT | Mod: CPTII,,, | Performed by: STUDENT IN AN ORGANIZED HEALTH CARE EDUCATION/TRAINING PROGRAM

## 2022-05-13 PROCEDURE — 3074F PR MOST RECENT SYSTOLIC BLOOD PRESSURE < 130 MM HG: ICD-10-PCS | Mod: CPTII,,, | Performed by: STUDENT IN AN ORGANIZED HEALTH CARE EDUCATION/TRAINING PROGRAM

## 2022-05-13 PROCEDURE — 3008F PR BODY MASS INDEX (BMI) DOCUMENTED: ICD-10-PCS | Mod: CPTII,,, | Performed by: STUDENT IN AN ORGANIZED HEALTH CARE EDUCATION/TRAINING PROGRAM

## 2022-05-13 PROCEDURE — 1160F PR REVIEW ALL MEDS BY PRESCRIBER/CLIN PHARMACIST DOCUMENTED: ICD-10-PCS | Mod: CPTII,,, | Performed by: STUDENT IN AN ORGANIZED HEALTH CARE EDUCATION/TRAINING PROGRAM

## 2022-05-13 PROCEDURE — 99999 PR PBB SHADOW E&M-EST. PATIENT-LVL III: CPT | Mod: PBBFAC,,, | Performed by: STUDENT IN AN ORGANIZED HEALTH CARE EDUCATION/TRAINING PROGRAM

## 2022-05-13 PROCEDURE — 3078F PR MOST RECENT DIASTOLIC BLOOD PRESSURE < 80 MM HG: ICD-10-PCS | Mod: CPTII,,, | Performed by: STUDENT IN AN ORGANIZED HEALTH CARE EDUCATION/TRAINING PROGRAM

## 2022-05-13 PROCEDURE — 1160F RVW MEDS BY RX/DR IN RCRD: CPT | Mod: CPTII,,, | Performed by: STUDENT IN AN ORGANIZED HEALTH CARE EDUCATION/TRAINING PROGRAM

## 2022-05-13 PROCEDURE — 3008F BODY MASS INDEX DOCD: CPT | Mod: CPTII,,, | Performed by: STUDENT IN AN ORGANIZED HEALTH CARE EDUCATION/TRAINING PROGRAM

## 2022-05-13 PROCEDURE — 99214 OFFICE O/P EST MOD 30 MIN: CPT | Mod: S$PBB,,, | Performed by: STUDENT IN AN ORGANIZED HEALTH CARE EDUCATION/TRAINING PROGRAM

## 2022-05-13 NOTE — PROGRESS NOTES
[facility]  New Patient Visit      Disease Summary     Principle neurological diagnosis: IIH, ON    Date of symptom onset: 2013  Date of diagnosis: 2013  Disease type at diagnosis: RR  Disease type currently: RR  Previous therapy: Diamox   Current therapy: NA  Last MRI Brain: 3/24/22  Last MRI C-spine: NA  CSF: 2013, elevated OP, unknown lab results  JCV status: NA  Other relevant labs and tests: CNS demyelinating panel negative     History:     This is a patient with OD optic disc edema and blurred vision.    In 2013 when her symptoms initially started, it was a/w headaches, nausea/vomiting, and L eye painful vison loss (blacked out).  LP at that time showed mild elevated OP and she was started on Diamox for a couple months. She wasn't aware of any lab abnormalities.  Symptoms improved, however, she still has residual issues - left eye unable to read, can distinguish shapes.   In Jan 2022. This time around, it was the right eye with blurred vision, painful, she also has headaches, hearing her heartbeat in her ear. Nausea without vomiting, fatigue. Photophobia.     On neuro-ophthalmology visit 4/7/22 w/ Dr. Varghese, she was noted to have severe circumferential disc edema OD and pale flat disc OS with preserved visual acuity. She also has bilateral proptosis.     MRI and MRV 3/24/22 showed: Empty sella and mild increased ON sheath edema b/l and flattening of posterior sclera. Consistent with IIH. L ON T2 increased signal. No abnormal enhancement.      She was prescribed Diamox by Dr. Varghese but has not started. Her symptoms has already improved by then. She is able to read with her right eye. She still has mild photophobia.     On neuro ROS:  Denies weakness, sensory changes, incoordination.  She does have LLE giorgio horses, after exertion   Denies urinary dysfunction  Denies cognitive changes  Denies double vision     She has sickle cell trait.     She has a cousin with autoimmune disease  "affecting the nervous system, but otherwise no autoimmune history in herself or family.     ROS:     A complete 9 system ROS was reviewed by me and otherwise negative .     Past Medical History:   Diagnosis Date    Anxiety     Pseudotumor cerebri        Past Surgical History:   Procedure Laterality Date     SECTION      x2    TONSILLECTOMY         Family History   Problem Relation Age of Onset    Cancer Father     Diabetes Neg Hx     Eclampsia Neg Hx     Hypertension Neg Hx     Miscarriages / Stillbirths Neg Hx      labor Neg Hx     Stroke Neg Hx        Social History     Socioeconomic History    Marital status: Single   Occupational History     Employer: [employer]   Tobacco Use    Smoking status: Current Some Day Smoker     Types: Cigarettes    Smokeless tobacco: Never Used   Substance and Sexual Activity    Alcohol use: Yes     Comment: occas    Drug use: Yes     Types: Marijuana    Sexual activity: Yes     Partners: Male     Birth control/protection: OCP       Review of patient's allergies indicates:   Allergen Reactions    Pcn [penicillins] Swelling       Living arrangements - the patient lives with their family.        Exam:     Vitals:    22 1058   BP: 129/74   Pulse: 79   Weight: 127 kg (279 lb 15.8 oz)   Height: 5' 5" (1.651 m)          In general, the patient is well nourished and appears to be in no acute distress.    OS disc pallor on fundoscopic exam.   OD optic disc edema improved. No pallor.     MENTAL STATUS: language is fluent, normal verbal comprehension, short-term and remote memory is intact, attention is normal, patient is alert and oriented x 3, fund of knowlege is appropriate by vocabulary.     CRANIAL NERVE EXAM:  There is no intrernuclear ophthalmoplegia.  Extraocular muscles are intact. VF intact. Pupils are equal, round, and reactive to light. No facial asymmetry. Facial sensation is intact bilaterally. There is no dysarthria. Uvula is midline, and " palate moves symmetrically. Shoulder shrug intact bilaterlly. Tongue protrusion is midline. Hearing is intact to finger rub bilaterally. Neck is supple with full ROM  No Lhermitte's    MOTOR EXAM: Normal bulk and tone throughout UE and LE bilaterally.   No pronator drift; rapid sequential movements are normal; Strength is  5/5 in all groups in the lower extremities and upper extremities    REFLEXES: 2+ and symmetric throughout in all four extremeties; toes are down bilaterally; negative Moore's, no cross-adductors    SENSORY EXAM: Normal to light touch, vibration, and temperature throughout.    COORDINATION: Normal finger-to-nose exam. Normal heel-to-shin exam.    GAIT: Narrow based and stable. Able to heel walk, toe walk, and perform tandem gait.     Negative Romberg's          Imaging (personally reviewed):   MRI and MRV brain 3/24/22  Empty sella configuration, mild increased edema in the optic nerve sheaths bilaterally and mild flattening of the posterior sclera, and narrowing at the right transverse and sigmoid sinuses with associated prominent arachnoid granulation.  Constellation of findings can be seen in the setting of idiopathic intracranial hypertension.  Correlation is advised.  Consider consultation with interventional neuroradiology, if clinically indicated.     Mild asymmetric increased T2 signal in the left optic nerve as compared to the right is best appreciated on T2 coronal imaging.  This finding can be seen with optic neuritis of the left optic nerve.  No abnormal enhancement of the optic nerves.  Enhancement is typically seen in greater than 90% of acute cases of optic neuritis in the 1st 2-3 weeks.      Labs:          Lab Results   Component Value Date    WBC 5.97 03/23/2022    RBC 4.47 03/23/2022    HGB 10.2 (L) 03/23/2022    HCT 33.1 (L) 03/23/2022    MCV 74 (L) 03/23/2022    MCH 22.8 (L) 03/23/2022    MCHC 30.8 (L) 03/23/2022    RDW 16.9 (H) 03/23/2022     03/23/2022    MPV 9.9  03/23/2022    GRAN 3.0 03/23/2022    GRAN 50.1 03/23/2022    LYMPH 2.4 03/23/2022    LYMPH 40.2 03/23/2022    MONO 0.3 03/23/2022    MONO 4.5 03/23/2022    EOS 0.3 03/23/2022    BASO 0.03 03/23/2022    EOSINOPHIL 4.5 03/23/2022    BASOPHIL 0.5 03/23/2022     Sodium   Date Value Ref Range Status   08/27/2013 137 136 - 145 mmol/L Final     Potassium   Date Value Ref Range Status   08/27/2013 3.5 3.5 - 5.1 mmol/L Final     Chloride   Date Value Ref Range Status   08/27/2013 99 95 - 110 mmol/L Final     CO2   Date Value Ref Range Status   08/27/2013 29 23 - 29 mmol/L Final     Glucose   Date Value Ref Range Status   08/27/2013 88 70 - 110 mg/dl Final     BUN   Date Value Ref Range Status   08/27/2013 6 6 - 20 mg/dl Final     Creatinine   Date Value Ref Range Status   08/27/2013 0.9 0.5 - 1.4 mg/dl Final     Calcium   Date Value Ref Range Status   08/27/2013 10.0 8.7 - 10.5 mg/dl Final     Total Protein   Date Value Ref Range Status   08/27/2013 7.5 6.0 - 8.4 g/dL Final     Albumin   Date Value Ref Range Status   08/27/2013 3.5 3.5 - 5.2 g/dl Final     Total Bilirubin   Date Value Ref Range Status   08/27/2013 0.5 0.1 - 1.0 mg/dl Final     Comment:     For infants and newborns, interpretation of results should be based  on gestational age, weight and in agreement with clinical  observations.  .  Premature Infant recommended reference ranges:  Up to 24 hours.............<8.0 mg/dl  Up to 48 hours............<12.0 mg/dl  3-5 days..................<15.0 mg/dl  6-29 days.................<15.0 mg/dl     Alkaline Phosphatase   Date Value Ref Range Status   08/27/2013 86 55 - 135 U/L Final     AST   Date Value Ref Range Status   08/27/2013 34 10 - 40 U/L Final     ALT   Date Value Ref Range Status   08/27/2013 43 10 - 44 U/L Final     Anion Gap   Date Value Ref Range Status   08/27/2013 9.0 8 - 16 mmol/L Final     eGFR if    Date Value Ref Range Status   08/27/2013 >60 >60 mL/min Final     Comment:     Estimated  glomerular filtration rate (eGFR) is normalized to an  average body surface area of 1.73 square meters.  The calculation  used to obtain the eGFR is the adjusted MDRD equation, which factors  patient sex, age, race, and creatinine result.  Since race is unknown  in our information system, the eGFR values for -American  and Non--American patients are given for each creatinine  result.     eGFR if non    Date Value Ref Range Status   08/27/2013 >60 >60 mL/min Final     Component      Latest Ref Rng & Units 3/23/2022 3/23/2022 3/23/2022          12:15 PM 12:15 PM 12:05 PM   WBC      3.90 - 12.70 K/uL  5.97    RBC      4.00 - 5.40 M/uL  4.47    Hemoglobin      12.0 - 16.0 g/dL  10.2 (L)    Hematocrit      37.0 - 48.5 %  33.1 (L)    MCV      82 - 98 fL  74 (L)    MCH      27.0 - 31.0 pg  22.8 (L)    MCHC      32.0 - 36.0 g/dL  30.8 (L)    RDW      11.5 - 14.5 %  16.9 (H)    Platelets      150 - 450 K/uL  330    MPV      9.2 - 12.9 fL  9.9    Immature Granulocytes      0.0 - 0.5 %  0.2    Gran # (ANC)      1.8 - 7.7 K/uL  3.0    Immature Grans (Abs)      0.00 - 0.04 K/uL  0.01    Lymph #      1.0 - 4.8 K/uL  2.4    Mono #      0.3 - 1.0 K/uL  0.3    Eos #      0.0 - 0.5 K/uL  0.3    Baso #      0.00 - 0.20 K/uL  0.03    nRBC      0 /100 WBC  0    Gran %      38.0 - 73.0 %  50.1    Lymph %      18.0 - 48.0 %  40.2    Mono %      4.0 - 15.0 %  4.5    Eosinophil %      0.0 - 8.0 %  4.5    Basophil %      0.0 - 1.9 %  0.5    Differential Method        Automated    Anti Sm Antibody      0.00 - 0.99 Ratio 0.24     Anti-Sm Interpretation      Negative Negative     Anti-SSA Antibody      0.00 - 0.99 Ratio 0.16 0.16    Anti-SSA Interpretation      Negative Negative Negative    Anti-SSB Antibody      0.00 - 0.99 Ratio 0.07 0.07    Anti-SSB Interpretation      Negative Negative Negative    ds DNA Ab      Negative 1:10 Negative 1:10     Anti Sm/RNP Antibody      0.00 - 0.99 Ratio 0.44     Anti-Sm/RNP  Interpretation      Negative Negative     NIL      IU/mL  0.41908    TB1 - Nil      IU/mL  0.003    TB2 - Nil      IU/mL  -0.004    Mitogen - Nil      IU/mL  9.973    TB Gold Plus      Negative  Negative    B. henselae IgG      <1:128 titer  <1:128    B. henselae IgM      <1:20 titer  <1:20    B haskins IgG      <1:128 titer  <1:128    B Haskins IgM      <1:20 titer  <1:20    CNS Demyelinating Disease Eval         SEE BELOW   NMO/AQP4 FACS,S      Negative   Negative   MOG-IgG1      Negative   Negative   Cytoplasmic Neutrophilic Ab      <1:20 Titer  <1:20    Perinuclear (P-ANCA)      <1:20 Titer  <1:20    EMORY Screen      Negative <1:80  Positive (A)    Angio Convert Enzyme      16 - 85 U/L  24    CRP      0.0 - 8.2 mg/L  4.4    Sed Rate      0 - 36 mm/Hr   38 (H)   RPR      Non-reactive   Non-reactive   HIV 1/2 Ag/Ab      Negative  Negative    Free T4      0.71 - 1.51 ng/dL  0.96    TSH      0.400 - 4.000 uIU/mL  1.637    IgG 4      4 - 86 mg/dL  13    Thyroperoxidase Antibodies      <6.0 IU/mL   <6.0   Thyroid-Stim IG Quantitative      <0.10 IU/L   <0.10   Thyrotropin Receptor Ab      0.00 - 1.75 IU/L   <1.10   EMORY PATTERN 1        Speckled    EMORY Titer 1        1:80               Diagnosis/Assessment/Plan:     Recurrent optic disc edema in setting of elevated opening pressure and empty sella on MRI, consistent with IIH. No characteristic demyelinating plaques on imaging. Exam findings consistent with bilateral optic neuropathy, no other focal findings. NMO and MOG IgG negative.  Currently atypical for MS, however, we will scan her C-spine to screen for demyelinating conditions that may mimic MS.  No DMT warranted at this time.  Okay to continue diamox for headaches presumed secondary to IIH.   Lifestyle modifications for brain health discussed.  Return to clinic as needed.             Total time spent with the patient: 45 minutes, 30 minutes of face to face consultation and 15 minutes of chart review and  coordination of care, on the day of the visit. This includes face to face time and non-face to face time preparing to see the patient (eg, review of tests), obtaining and/or reviewing separately obtained history, documenting clinical information in the electronic or other health record, independently interpreting resultsand communicating results to the patient/family/caregiver, or care coordination.         Sheba Knight MD, MSc  Attending neurologist

## 2023-03-20 ENCOUNTER — HOSPITAL ENCOUNTER (OUTPATIENT)
Facility: HOSPITAL | Age: 33
Discharge: HOME | End: 2023-03-21
Attending: EMERGENCY MEDICINE | Admitting: HOSPITALIST
Payer: MEDICAID

## 2023-03-20 DIAGNOSIS — N93.9 VAGINAL BLEEDING: ICD-10-CM

## 2023-03-20 DIAGNOSIS — R07.9 CHEST PAIN: ICD-10-CM

## 2023-03-20 DIAGNOSIS — D64.9 SYMPTOMATIC ANEMIA: Primary | ICD-10-CM

## 2023-03-20 PROBLEM — E66.01 MORBID OBESITY WITH BMI OF 45.0-49.9, ADULT: Status: ACTIVE | Noted: 2023-03-20

## 2023-03-20 LAB
ABO + RH BLD: NORMAL
ANION GAP SERPL CALC-SCNC: 12 MMOL/L (ref 8–16)
B-HCG UR QL: NEGATIVE
BASOPHILS # BLD AUTO: 0.04 K/UL (ref 0–0.2)
BASOPHILS NFR BLD: 0.5 % (ref 0–1.9)
BLD GP AB SCN CELLS X3 SERPL QL: NORMAL
BUN SERPL-MCNC: 9 MG/DL (ref 6–20)
CALCIUM SERPL-MCNC: 9 MG/DL (ref 8.7–10.5)
CHLORIDE SERPL-SCNC: 109 MMOL/L (ref 95–110)
CO2 SERPL-SCNC: 18 MMOL/L (ref 23–29)
CREAT SERPL-MCNC: 1 MG/DL (ref 0.5–1.4)
CTP QC/QA: YES
DIFFERENTIAL METHOD: ABNORMAL
EOSINOPHIL # BLD AUTO: 0.2 K/UL (ref 0–0.5)
EOSINOPHIL NFR BLD: 2.6 % (ref 0–8)
ERYTHROCYTE [DISTWIDTH] IN BLOOD BY AUTOMATED COUNT: 18.2 % (ref 11.5–14.5)
EST. GFR  (NO RACE VARIABLE): >60 ML/MIN/1.73 M^2
FERRITIN SERPL-MCNC: 5 NG/ML (ref 20–300)
GLUCOSE SERPL-MCNC: 88 MG/DL (ref 70–110)
HCT VFR BLD AUTO: 22.9 % (ref 37–48.5)
HGB BLD-MCNC: 6.9 G/DL (ref 12–16)
IMM GRANULOCYTES # BLD AUTO: 0.02 K/UL (ref 0–0.04)
IMM GRANULOCYTES NFR BLD AUTO: 0.2 % (ref 0–0.5)
IRON SERPL-MCNC: 17 UG/DL (ref 30–160)
LYMPHOCYTES # BLD AUTO: 3.9 K/UL (ref 1–4.8)
LYMPHOCYTES NFR BLD: 45.2 % (ref 18–48)
MCH RBC QN AUTO: 22.4 PG (ref 27–31)
MCHC RBC AUTO-ENTMCNC: 30.1 G/DL (ref 32–36)
MCV RBC AUTO: 74 FL (ref 82–98)
MONOCYTES # BLD AUTO: 0.5 K/UL (ref 0.3–1)
MONOCYTES NFR BLD: 6 % (ref 4–15)
NEUTROPHILS # BLD AUTO: 3.9 K/UL (ref 1.8–7.7)
NEUTROPHILS NFR BLD: 45.5 % (ref 38–73)
NRBC BLD-RTO: 0 /100 WBC
PLATELET # BLD AUTO: 330 K/UL (ref 150–450)
PMV BLD AUTO: 9.5 FL (ref 9.2–12.9)
POTASSIUM SERPL-SCNC: 4.3 MMOL/L (ref 3.5–5.1)
RBC # BLD AUTO: 3.08 M/UL (ref 4–5.4)
SATURATED IRON: 4 % (ref 20–50)
SODIUM SERPL-SCNC: 139 MMOL/L (ref 136–145)
TOTAL IRON BINDING CAPACITY: 471 UG/DL (ref 250–450)
TRANSFERRIN SERPL-MCNC: 318 MG/DL (ref 200–375)
VIT B12 SERPL-MCNC: 319 PG/ML (ref 210–950)
WBC # BLD AUTO: 8.62 K/UL (ref 3.9–12.7)

## 2023-03-20 PROCEDURE — 99223 PR INITIAL HOSPITAL CARE,LEVL III: ICD-10-PCS | Mod: ,,, | Performed by: NURSE PRACTITIONER

## 2023-03-20 PROCEDURE — 85025 COMPLETE CBC W/AUTO DIFF WBC: CPT | Performed by: EMERGENCY MEDICINE

## 2023-03-20 PROCEDURE — 99285 EMERGENCY DEPT VISIT HI MDM: CPT | Mod: ,,, | Performed by: EMERGENCY MEDICINE

## 2023-03-20 PROCEDURE — 86920 COMPATIBILITY TEST SPIN: CPT | Performed by: NURSE PRACTITIONER

## 2023-03-20 PROCEDURE — 82728 ASSAY OF FERRITIN: CPT | Performed by: NURSE PRACTITIONER

## 2023-03-20 PROCEDURE — G0378 HOSPITAL OBSERVATION PER HR: HCPCS

## 2023-03-20 PROCEDURE — 86900 BLOOD TYPING SEROLOGIC ABO: CPT | Performed by: NURSE PRACTITIONER

## 2023-03-20 PROCEDURE — 82607 VITAMIN B-12: CPT | Performed by: NURSE PRACTITIONER

## 2023-03-20 PROCEDURE — 96374 THER/PROPH/DIAG INJ IV PUSH: CPT

## 2023-03-20 PROCEDURE — 84466 ASSAY OF TRANSFERRIN: CPT | Performed by: NURSE PRACTITIONER

## 2023-03-20 PROCEDURE — 99285 EMERGENCY DEPT VISIT HI MDM: CPT | Mod: 25

## 2023-03-20 PROCEDURE — 25000003 PHARM REV CODE 250: Performed by: EMERGENCY MEDICINE

## 2023-03-20 PROCEDURE — 81025 URINE PREGNANCY TEST: CPT | Performed by: EMERGENCY MEDICINE

## 2023-03-20 PROCEDURE — 82746 ASSAY OF FOLIC ACID SERUM: CPT | Performed by: NURSE PRACTITIONER

## 2023-03-20 PROCEDURE — 99285 PR EMERGENCY DEPT VISIT,LEVEL V: ICD-10-PCS | Mod: ,,, | Performed by: EMERGENCY MEDICINE

## 2023-03-20 PROCEDURE — 99223 1ST HOSP IP/OBS HIGH 75: CPT | Mod: ,,, | Performed by: NURSE PRACTITIONER

## 2023-03-20 PROCEDURE — 80048 BASIC METABOLIC PNL TOTAL CA: CPT | Performed by: EMERGENCY MEDICINE

## 2023-03-20 RX ORDER — ACETAMINOPHEN 325 MG/1
650 TABLET ORAL EVERY 4 HOURS PRN
Status: DISCONTINUED | OUTPATIENT
Start: 2023-03-20 | End: 2023-03-21 | Stop reason: HOSPADM

## 2023-03-20 RX ORDER — IBUPROFEN 400 MG/1
400 TABLET ORAL EVERY 6 HOURS PRN
Status: DISCONTINUED | OUTPATIENT
Start: 2023-03-20 | End: 2023-03-21 | Stop reason: HOSPADM

## 2023-03-20 RX ORDER — KETOROLAC TROMETHAMINE 30 MG/ML
15 INJECTION, SOLUTION INTRAMUSCULAR; INTRAVENOUS
Status: DISCONTINUED | OUTPATIENT
Start: 2023-03-20 | End: 2023-03-20

## 2023-03-20 RX ORDER — LANOLIN ALCOHOL/MO/W.PET/CERES
1 CREAM (GRAM) TOPICAL DAILY
Status: DISCONTINUED | OUTPATIENT
Start: 2023-03-21 | End: 2023-03-21 | Stop reason: HOSPADM

## 2023-03-20 RX ORDER — SODIUM CHLORIDE 9 MG/ML
1000 INJECTION, SOLUTION INTRAVENOUS
Status: COMPLETED | OUTPATIENT
Start: 2023-03-20 | End: 2023-03-20

## 2023-03-20 RX ORDER — DEXTROSE 40 %
30 GEL (GRAM) ORAL
Status: DISCONTINUED | OUTPATIENT
Start: 2023-03-20 | End: 2023-03-21 | Stop reason: HOSPADM

## 2023-03-20 RX ORDER — SODIUM CHLORIDE 0.9 % (FLUSH) 0.9 %
10 SYRINGE (ML) INJECTION EVERY 12 HOURS PRN
Status: DISCONTINUED | OUTPATIENT
Start: 2023-03-20 | End: 2023-03-21 | Stop reason: HOSPADM

## 2023-03-20 RX ORDER — DEXTROSE 40 %
15 GEL (GRAM) ORAL
Status: DISCONTINUED | OUTPATIENT
Start: 2023-03-20 | End: 2023-03-21 | Stop reason: HOSPADM

## 2023-03-20 RX ORDER — ONDANSETRON 2 MG/ML
4 INJECTION INTRAMUSCULAR; INTRAVENOUS EVERY 8 HOURS PRN
Status: DISCONTINUED | OUTPATIENT
Start: 2023-03-20 | End: 2023-03-21 | Stop reason: HOSPADM

## 2023-03-20 RX ORDER — GLUCAGON 1 MG
1 KIT INJECTION
Status: DISCONTINUED | OUTPATIENT
Start: 2023-03-20 | End: 2023-03-21 | Stop reason: HOSPADM

## 2023-03-20 RX ORDER — HYDROCODONE BITARTRATE AND ACETAMINOPHEN 500; 5 MG/1; MG/1
TABLET ORAL
Status: DISCONTINUED | OUTPATIENT
Start: 2023-03-20 | End: 2023-03-21 | Stop reason: HOSPADM

## 2023-03-20 RX ADMIN — SODIUM CHLORIDE 1000 ML: 9 INJECTION, SOLUTION INTRAVENOUS at 08:03

## 2023-03-20 NOTE — Clinical Note
Diagnosis: Symptomatic anemia [[id number]]   Future Attending Provider: MARCIAL VORA [[id number]]   Admitting Provider:: MARCIAL VORA [[id number]]

## 2023-03-20 NOTE — LETTER
March 21, 2023         [address]  [facility] [address]  Phone: [phone]  Fax: [phone]       Patient: Pinky Kate   YOB: 1990  Date of Visit: 03/21/2023    To Whom It May Concern:    Camryn Kate  was at [facility] from 03/20/2023- 03/21/2023. The patient may return to work/school on 03/22/23 with no restrictions. If you have any questions or concerns, or if I can be of further assistance, please do not hesitate to contact me.    Sincerely,    Alisa Hernadez, NP

## 2023-03-21 VITALS
HEART RATE: 98 BPM | OXYGEN SATURATION: 97 % | SYSTOLIC BLOOD PRESSURE: 124 MMHG | RESPIRATION RATE: 18 BRPM | TEMPERATURE: 100 F | WEIGHT: 293 LBS | HEIGHT: 65 IN | DIASTOLIC BLOOD PRESSURE: 80 MMHG | BODY MASS INDEX: 48.82 KG/M2

## 2023-03-21 PROBLEM — G93.2 IIH (IDIOPATHIC INTRACRANIAL HYPERTENSION): Status: ACTIVE | Noted: 2023-03-21

## 2023-03-21 LAB
ANION GAP SERPL CALC-SCNC: 8 MMOL/L (ref 8–16)
BASOPHILS # BLD AUTO: 0.02 K/UL (ref 0–0.2)
BASOPHILS # BLD AUTO: 0.04 K/UL (ref 0–0.2)
BASOPHILS NFR BLD: 0.2 % (ref 0–1.9)
BASOPHILS NFR BLD: 0.5 % (ref 0–1.9)
BLD PROD TYP BPU: NORMAL
BLOOD UNIT EXPIRATION DATE: NORMAL
BLOOD UNIT TYPE CODE: 6200
BLOOD UNIT TYPE: NORMAL
BUN SERPL-MCNC: 12 MG/DL (ref 6–20)
CALCIUM SERPL-MCNC: 8.7 MG/DL (ref 8.7–10.5)
CHLORIDE SERPL-SCNC: 109 MMOL/L (ref 95–110)
CO2 SERPL-SCNC: 23 MMOL/L (ref 23–29)
CODING SYSTEM: NORMAL
CREAT SERPL-MCNC: 1 MG/DL (ref 0.5–1.4)
CROSSMATCH INTERPRETATION: NORMAL
DIFFERENTIAL METHOD: ABNORMAL
DIFFERENTIAL METHOD: ABNORMAL
DISPENSE STATUS: NORMAL
EOSINOPHIL # BLD AUTO: 0.2 K/UL (ref 0–0.5)
EOSINOPHIL # BLD AUTO: 0.3 K/UL (ref 0–0.5)
EOSINOPHIL NFR BLD: 2.7 % (ref 0–8)
EOSINOPHIL NFR BLD: 3.1 % (ref 0–8)
ERYTHROCYTE [DISTWIDTH] IN BLOOD BY AUTOMATED COUNT: 18.3 % (ref 11.5–14.5)
ERYTHROCYTE [DISTWIDTH] IN BLOOD BY AUTOMATED COUNT: 18.9 % (ref 11.5–14.5)
EST. GFR  (NO RACE VARIABLE): >60 ML/MIN/1.73 M^2
FOLATE SERPL-MCNC: 27.8 NG/ML (ref 4–24)
GLUCOSE SERPL-MCNC: 90 MG/DL (ref 70–110)
HCT VFR BLD AUTO: 23.6 % (ref 37–48.5)
HCT VFR BLD AUTO: 25.2 % (ref 37–48.5)
HGB BLD-MCNC: 7.2 G/DL (ref 12–16)
HGB BLD-MCNC: 7.7 G/DL (ref 12–16)
IMM GRANULOCYTES # BLD AUTO: 0.02 K/UL (ref 0–0.04)
IMM GRANULOCYTES # BLD AUTO: 0.07 K/UL (ref 0–0.04)
IMM GRANULOCYTES NFR BLD AUTO: 0.2 % (ref 0–0.5)
IMM GRANULOCYTES NFR BLD AUTO: 0.8 % (ref 0–0.5)
LYMPHOCYTES # BLD AUTO: 2.7 K/UL (ref 1–4.8)
LYMPHOCYTES # BLD AUTO: 3.7 K/UL (ref 1–4.8)
LYMPHOCYTES NFR BLD: 31.7 % (ref 18–48)
LYMPHOCYTES NFR BLD: 44.7 % (ref 18–48)
MAGNESIUM SERPL-MCNC: 2 MG/DL (ref 1.6–2.6)
MCH RBC QN AUTO: 22.8 PG (ref 27–31)
MCH RBC QN AUTO: 23.1 PG (ref 27–31)
MCHC RBC AUTO-ENTMCNC: 30.5 G/DL (ref 32–36)
MCHC RBC AUTO-ENTMCNC: 30.6 G/DL (ref 32–36)
MCV RBC AUTO: 75 FL (ref 82–98)
MCV RBC AUTO: 76 FL (ref 82–98)
MONOCYTES # BLD AUTO: 0.4 K/UL (ref 0.3–1)
MONOCYTES # BLD AUTO: 0.5 K/UL (ref 0.3–1)
MONOCYTES NFR BLD: 4.9 % (ref 4–15)
MONOCYTES NFR BLD: 5.9 % (ref 4–15)
NEUTROPHILS # BLD AUTO: 3.8 K/UL (ref 1.8–7.7)
NEUTROPHILS # BLD AUTO: 5.1 K/UL (ref 1.8–7.7)
NEUTROPHILS NFR BLD: 45.6 % (ref 38–73)
NEUTROPHILS NFR BLD: 59.7 % (ref 38–73)
NRBC BLD-RTO: 1 /100 WBC
NRBC BLD-RTO: 1 /100 WBC
NUM UNITS TRANS PACKED RBC: NORMAL
PLATELET # BLD AUTO: 309 K/UL (ref 150–450)
PLATELET # BLD AUTO: 324 K/UL (ref 150–450)
PMV BLD AUTO: 9.8 FL (ref 9.2–12.9)
PMV BLD AUTO: 9.9 FL (ref 9.2–12.9)
POTASSIUM SERPL-SCNC: 4 MMOL/L (ref 3.5–5.1)
RBC # BLD AUTO: 3.12 M/UL (ref 4–5.4)
RBC # BLD AUTO: 3.38 M/UL (ref 4–5.4)
SODIUM SERPL-SCNC: 140 MMOL/L (ref 136–145)
WBC # BLD AUTO: 8.3 K/UL (ref 3.9–12.7)
WBC # BLD AUTO: 8.51 K/UL (ref 3.9–12.7)

## 2023-03-21 PROCEDURE — 36430 TRANSFUSION BLD/BLD COMPNT: CPT

## 2023-03-21 PROCEDURE — 85025 COMPLETE CBC W/AUTO DIFF WBC: CPT | Mod: 91

## 2023-03-21 PROCEDURE — G0378 HOSPITAL OBSERVATION PER HR: HCPCS

## 2023-03-21 PROCEDURE — P9016 RBC LEUKOCYTES REDUCED: HCPCS | Performed by: NURSE PRACTITIONER

## 2023-03-21 PROCEDURE — 63600175 PHARM REV CODE 636 W HCPCS: Mod: JZ,JG | Performed by: HOSPITALIST

## 2023-03-21 PROCEDURE — 25000003 PHARM REV CODE 250

## 2023-03-21 PROCEDURE — 85025 COMPLETE CBC W/AUTO DIFF WBC: CPT | Performed by: NURSE PRACTITIONER

## 2023-03-21 PROCEDURE — 99239 PR HOSPITAL DISCHARGE DAY,>30 MIN: ICD-10-PCS | Mod: ,,,

## 2023-03-21 PROCEDURE — 83735 ASSAY OF MAGNESIUM: CPT | Performed by: NURSE PRACTITIONER

## 2023-03-21 PROCEDURE — 36415 COLL VENOUS BLD VENIPUNCTURE: CPT

## 2023-03-21 PROCEDURE — 99239 HOSP IP/OBS DSCHRG MGMT >30: CPT | Mod: ,,,

## 2023-03-21 PROCEDURE — 80048 BASIC METABOLIC PNL TOTAL CA: CPT | Performed by: NURSE PRACTITIONER

## 2023-03-21 PROCEDURE — 96375 TX/PRO/DX INJ NEW DRUG ADDON: CPT

## 2023-03-21 PROCEDURE — 25000003 PHARM REV CODE 250: Performed by: NURSE PRACTITIONER

## 2023-03-21 PROCEDURE — 25000003 PHARM REV CODE 250: Performed by: HOSPITALIST

## 2023-03-21 PROCEDURE — 36415 COLL VENOUS BLD VENIPUNCTURE: CPT | Performed by: NURSE PRACTITIONER

## 2023-03-21 RX ORDER — MEDROXYPROGESTERONE ACETATE 10 MG/1
20 TABLET ORAL DAILY
Status: DISCONTINUED | OUTPATIENT
Start: 2023-03-21 | End: 2023-03-21 | Stop reason: HOSPADM

## 2023-03-21 RX ORDER — MEDROXYPROGESTERONE ACETATE 10 MG/1
20 TABLET ORAL DAILY
Qty: 60 TABLET | Refills: 11 | Status: SHIPPED | OUTPATIENT
Start: 2023-03-21 | End: 2024-03-20

## 2023-03-21 RX ORDER — MEDROXYPROGESTERONE ACETATE 2.5 MG/1
2.5 TABLET ORAL DAILY
Status: CANCELLED | OUTPATIENT
Start: 2023-03-21

## 2023-03-21 RX ORDER — CYANOCOBALAMIN (VITAMIN B-12) 250 MCG
250 TABLET ORAL DAILY
Status: DISCONTINUED | OUTPATIENT
Start: 2023-03-21 | End: 2023-03-21 | Stop reason: HOSPADM

## 2023-03-21 RX ORDER — FERROUS SULFATE 325(65) MG
325 TABLET, DELAYED RELEASE (ENTERIC COATED) ORAL DAILY
Qty: 90 TABLET | Refills: 3 | Status: SHIPPED | OUTPATIENT
Start: 2023-03-21 | End: 2024-03-20

## 2023-03-21 RX ADMIN — Medication 250 MCG: at 08:03

## 2023-03-21 RX ADMIN — FERROUS SULFATE TAB 325 MG (65 MG ELEMENTAL FE) 1 EACH: 325 (65 FE) TAB at 08:03

## 2023-03-21 RX ADMIN — FERUMOXYTOL 510 MG: 510 INJECTION INTRAVENOUS at 01:03

## 2023-03-21 RX ADMIN — DOCUSATE SODIUM 50 MG: 50 CAPSULE, LIQUID FILLED ORAL at 01:03

## 2023-03-21 RX ADMIN — MEDROXYPROGESTERONE ACETATE 20 MG: 10 TABLET ORAL at 05:03

## 2023-03-21 NOTE — ASSESSMENT & PLAN NOTE
I reviewed the H&P, I examined the patient, and there are no changes in the patient's condition.      There were no vitals taken for this visit.    Heart: rrr    Lungs: ctab     Patient reports having vaginal bleeding x3 weeks. She reports experiencing similar symptoms last year, notes menstrual cycle lasted for 3 months. Not currently on OCP.   -Could consider adding provera if bleeding does not continue to improve.  -Previously seen by OB/GYN and EmBx done which showed no endometrial polyps or evidence of chronic endometritis identified. Negative for hyperplasia or carcinoma.  -Patient offered mirena as well but was not interested at the time.   -Previous pelvic US reviewed- no acute process seen. Intramural leiomyoma in the uterine body abuts the endometrial canal.  -OBGYN consulted, appreciate assistance  -Would benefit from outpatient follow up with OB/GYN.

## 2023-03-21 NOTE — SUBJECTIVE & OBJECTIVE
Interval History: NAEON, AF, VSS. Hgb 6.9>> 7.2 after transfusion. Will monitor Hgb. Patient endorsed continued vaginal bleeding. Has to change tampon/pad every hour with products being fully saturated/ bleeding onto her clothes. Bleeding has not decreased since admission. OBGYN consulted for further management. Will continue to monitor overnight.     Review of Systems   Constitutional:  Positive for chills and fatigue. Negative for appetite change, diaphoresis and fever.   HENT:  Negative for congestion, rhinorrhea and sore throat.    Eyes:  Negative for photophobia and visual disturbance.   Respiratory:  Negative for cough, shortness of breath and wheezing.         +ESCOBAR   Cardiovascular:  Positive for palpitations. Negative for chest pain and leg swelling.   Gastrointestinal:  Negative for abdominal distention, abdominal pain, diarrhea, nausea and vomiting.   Genitourinary:  Positive for vaginal bleeding. Negative for dysuria and frequency.   Musculoskeletal:  Negative for arthralgias, back pain and myalgias.   Skin:  Negative for color change, pallor, rash and wound.   Neurological:  Positive for light-headedness and headaches. Negative for dizziness, syncope and weakness.   Psychiatric/Behavioral:  Negative for confusion and hallucinations. The patient is not nervous/anxious.    Objective:     Vital Signs (Most Recent):  Temp: 98.1 °F (36.7 °C) (03/21/23 0753)  Pulse: 80 (03/21/23 0753)  Resp: 18 (03/21/23 0753)  BP: 125/79 (03/21/23 0753)  SpO2: 97 % (03/21/23 0753) Vital Signs (24h Range):  Temp:  [97.7 °F (36.5 °C)-98.7 °F (37.1 °C)] 98.1 °F (36.7 °C)  Pulse:  [] 80  Resp:  [14-18] 18  SpO2:  [97 %-100 %] 97 %  BP: (106-157)/(55-91) 125/79     Weight: (!) 136.4 kg (300 lb 11.3 oz)  Body mass index is 50.04 kg/m².    Intake/Output Summary (Last 24 hours) at 3/21/2023 1229  Last data filed at 3/21/2023 0345  Gross per 24 hour   Intake 410.42 ml   Output --   Net 410.42 ml      Physical Exam  Vitals and  nursing note reviewed.   Constitutional:       General: She is not in acute distress.     Appearance: She is obese. She is not toxic-appearing or diaphoretic.   HENT:      Head: Normocephalic and atraumatic.      Nose: Nose normal.      Mouth/Throat:      Mouth: Mucous membranes are moist.   Eyes:      Pupils: Pupils are equal, round, and reactive to light.   Cardiovascular:      Rate and Rhythm: Normal rate and regular rhythm.      Pulses: Normal pulses.   Pulmonary:      Effort: Pulmonary effort is normal. No respiratory distress.      Breath sounds: No wheezing, rhonchi or rales.   Abdominal:      General: Bowel sounds are normal. There is no distension.      Palpations: Abdomen is soft.      Tenderness: There is no abdominal tenderness. There is no guarding.   Genitourinary:     Comments: deferred  Musculoskeletal:         General: No swelling, tenderness or deformity. Normal range of motion.      Cervical back: Normal range of motion.   Skin:     General: Skin is warm and dry.      Capillary Refill: Capillary refill takes less than 2 seconds.   Neurological:      General: No focal deficit present.      Mental Status: She is alert and oriented to person, place, and time.      Sensory: No sensory deficit.      Motor: No weakness.   Psychiatric:         Mood and Affect: Mood normal.         Behavior: Behavior normal.       Significant Labs: All pertinent labs within the past 24 hours have been reviewed.  BMP:   Recent Labs   Lab 03/21/23  0405   GLU 90      K 4.0      CO2 23   BUN 12   CREATININE 1.0   CALCIUM 8.7   MG 2.0     CBC:   Recent Labs   Lab 03/20/23 1958 03/21/23  0405   WBC 8.62 8.30   HGB 6.9* 7.2*   HCT 22.9* 23.6*    309       Significant Imaging: I have reviewed all pertinent imaging results/findings within the past 24 hours.

## 2023-03-21 NOTE — CONSULTS
[facility]  Obstetrics & Gynecology  Consult Note    Patient Name: Pinky Kate  MRN: [id number]  Admission Date: 3/20/2023  Hospital Length of Stay: 0 days  Code Status: Full Code  Primary Care Provider: Primary Doctor No  Principal Problem: Symptomatic anemia    Inpatient consult to Obstetrics  Consult performed by: Nasrin Luque MD  Consult ordered by: Brittayn Hernandez PA-C      Subjective:     Chief Complaint: Heavy and irregular menstrual cycles    History of Present Illness: Pinky Kate is a 33yo  with a long history of abnormal uterine bleeding. Patient reports that ever since she starting having periods they have been irregular. After the birth of her second child her periods became heavier and longer. Current bleeding episode has been going on for 3 weeks. She reports fully saturating an ultra tampon or maxi pad every 2 hours for the past 3 weeks. She presented to the ED because she started having SOB with daily activities. H/H was 6.9/23. She was transfused 1u pRBCs and vanessa to 7.7/25. She reports improvement in symptoms today.     Patient reports taking OCPs in the past, but discontinued them due to adverse effects. She also states her bleeding did not improve significantly while taking OCPs. Patient has not been on any form of medical management for AUB for 6 years. She was seen by Dr. Cates 2022 and had an EMBx which was negative and a TVUS which showed a 3cm intramural uterine fibroid abutting the endometrial canal. Patient does not desire any form of contraception and is considering surgical management.     No current facility-administered medications on file prior to encounter.     Current Outpatient Medications on File Prior to Encounter   Medication Sig    acetaZOLAMIDE (DIAMOX) 500 mg CpSR Take 1 capsule (500 mg total) by mouth 2 (two) times daily. (Patient not taking: Reported on 2022)       Review of patient's allergies indicates:   Allergen Reactions    Pcn  [penicillins] Swelling       Past Medical History:   Diagnosis Date    Anxiety     Pseudotumor cerebri      OB History    Para Term  AB Living   2 2 0 0 0 2   SAB IAB Ectopic Multiple Live Births   0 0 0 0 0      # Outcome Date GA Lbr Casey/2nd Weight Sex Delivery Anes PTL Lv   2 Para            1 Para              Past Surgical History:   Procedure Laterality Date     SECTION      x2    TONSILLECTOMY       Family History       Problem Relation (Age of Onset)    Cancer Father          Tobacco Use    Smoking status: Some Days     Types: Cigarettes    Smokeless tobacco: Never   Substance and Sexual Activity    Alcohol use: Yes     Comment: occas    Drug use: Yes     Types: Marijuana    Sexual activity: Yes     Partners: Male     Birth control/protection: OCP     Review of Systems   Constitutional:  Positive for fever.   Respiratory:  Positive for shortness of breath.    Cardiovascular:  Negative for chest pain.   Gastrointestinal:  Negative for abdominal pain, nausea and vomiting.   Endocrine: Negative for hot flashes.   Genitourinary:  Positive for menorrhagia and vaginal bleeding.   Integumentary:  Negative for breast mass, nipple discharge and breast skin changes.   Neurological:  Negative for headaches.   Hematological:  Does not bruise/bleed easily.   Psychiatric/Behavioral:  Negative for depression.    Breast: Negative for mass, mastodynia, nipple discharge and skin changes  Objective:     Vital Signs (Most Recent):  Temp: 99.5 °F (37.5 °C) (23 1552)  Pulse: 98 (23 1552)  Resp: 18 (23 1552)  BP: 124/80 (23 1552)  SpO2: 97 % (23 1552) Vital Signs (24h Range):  Temp:  [97.7 °F (36.5 °C)-99.5 °F (37.5 °C)] 99.5 °F (37.5 °C)  Pulse:  [] 98  Resp:  [14-18] 18  SpO2:  [97 %-100 %] 97 %  BP: (106-157)/(55-91) 124/80     Weight: (!) 136.4 kg (300 lb 11.3 oz)  Body mass index is 50.04 kg/m².  No LMP recorded.    Physical Exam:   Constitutional: She is oriented  to person, place, and time. She appears well-developed and well-nourished.    HENT:   Head: Normocephalic and atraumatic.    Eyes: Conjunctivae are normal.     Cardiovascular:  Normal rate.             Pulmonary/Chest: No respiratory distress.        Abdominal: She exhibits no distension. There is no abdominal tenderness.     Genitourinary:    Genitourinary Comments: Mild bleeding from cervical os, cleared with 2 procto sawabs             Musculoskeletal: Normal range of motion.       Neurological: She is alert and oriented to person, place, and time.    Skin: Skin is warm and dry.    Psychiatric: She has a normal mood and affect.     Laboratory:  CBC:   Recent Labs   Lab 03/21/23  1336   WBC 8.51   RBC 3.38*   HGB 7.7*   HCT 25.2*      MCV 75*   MCH 22.8*   MCHC 30.6*         Assessment/Plan:     Active Diagnoses:    Diagnosis Date Noted POA    PRINCIPAL PROBLEM:  Symptomatic anemia [D64.9] 03/20/2023 Yes    IIH (idiopathic intracranial hypertension) [G93.2] 03/21/2023 Yes    Abnormal uterine bleeding (AUB) [N93.9] 03/20/2023 Yes    Morbid obesity with BMI of 45.0-49.9, adult [E66.01, Z68.42] 03/20/2023 Not Applicable      Problems Resolved During this Admission:       AUB  -VSS, hemodynamically stable  -H/H 6.9/22.9 > 1u pRBCs > 7.7/25.2  -TVUS performed 01/2022 shows a 3cm intramural uterine fibroid  -EMBx 01/2022: negative for hyperplasia   -On exam, mild bleeding from cervical os, cleared with 2 procto swabs  -Recommend Provera 20mg daily until patient can follow up with us in clinic and discuss further management options.  -Recommend daily iron supplement  -We discussed the hormonal treatments for AUB including oral progesterone, OCPs, Depo Provera, and progesterone IUD. The pros, cons, risks, and benefits of each was discussed. Patient has failed medical management in the past and is considering surgical management.  -May need repeat TVUS and EMBx in outpatient setting.     -She will follow up with the  Gyn resident clinic. Message sent to staff for scheduling.    Thank you for your consult. I will follow-up with patient. Please contact us if you have any additional questions.      Nasrin Luque MD  Obstetrics & Gynecology  [facility] - Observation [facility]H

## 2023-03-21 NOTE — PROGRESS NOTES
[facility] [address] [place] [address] Medicine  Progress Note    Patient Name: Pinky Kate  MRN: [id number]  Patient Class: OP- Observation   Admission Date: 3/20/2023  Length of Stay: 0 days  Attending Physician: Denise Bryan MD  Primary Care Provider: Primary Doctor No        Subjective:     Principal Problem:Symptomatic anemia        HPI:  Pinky Kate is a 32 y.o. female with a PMHx of morbid obesity, anemia, and AUB who presents to the ED with complaints of vaginal bleeding x3 weeks. She reports experiencing similar symptoms last year, notes menstrual cycle lasted for 3 months. She endorses lightheadedness, fatigue, chills, ESCOBAR, rapid heart beat, and headache. Denies receiving a blood transfusion in the past. She states after her last pregnancy her menstrual cycles have been irregular. The patient states she was initially saturating 6-7 tampons every hour which has improved to about 1 pad every 2 hours. Patient denies any CP, SOB, cough, fever, N/V/D, abdomina pain    In the ED, patient initially tachycardic which improved with IVF, otherwise VSSAF. Hgb 6.9 (bl 10-10.2). BMP unremarkable. UPT negative. The patient received 1L IVF.      Overview/Hospital Course:  Pinky Kate is a 32 y.o. female admitted to observation for symptomatic anemia. Hgb 6.9>>7.2 after 1 unit pRBCS. OBGYN consulted for continued vaginal bleeding.       Interval History: NAEON, AF, VSS. Hgb 6.9>> 7.2 after transfusion. Will monitor Hgb. Patient endorsed continued vaginal bleeding. Has to change tampon/pad every hour with products being fully saturated/ bleeding onto her clothes. Bleeding has not decreased since admission. OBGYN consulted for further management. Will continue to monitor overnight.     Review of Systems   Constitutional:  Positive for chills and fatigue. Negative for appetite change, diaphoresis and fever.   HENT:  Negative for congestion, rhinorrhea and sore throat.    Eyes:  Negative for photophobia and visual  disturbance.   Respiratory:  Negative for cough, shortness of breath and wheezing.         +ESCOBAR   Cardiovascular:  Positive for palpitations. Negative for chest pain and leg swelling.   Gastrointestinal:  Negative for abdominal distention, abdominal pain, diarrhea, nausea and vomiting.   Genitourinary:  Positive for vaginal bleeding. Negative for dysuria and frequency.   Musculoskeletal:  Negative for arthralgias, back pain and myalgias.   Skin:  Negative for color change, pallor, rash and wound.   Neurological:  Positive for light-headedness and headaches. Negative for dizziness, syncope and weakness.   Psychiatric/Behavioral:  Negative for confusion and hallucinations. The patient is not nervous/anxious.    Objective:     Vital Signs (Most Recent):  Temp: 98.1 °F (36.7 °C) (03/21/23 0753)  Pulse: 80 (03/21/23 0753)  Resp: 18 (03/21/23 0753)  BP: 125/79 (03/21/23 0753)  SpO2: 97 % (03/21/23 0753) Vital Signs (24h Range):  Temp:  [97.7 °F (36.5 °C)-98.7 °F (37.1 °C)] 98.1 °F (36.7 °C)  Pulse:  [] 80  Resp:  [14-18] 18  SpO2:  [97 %-100 %] 97 %  BP: (106-157)/(55-91) 125/79     Weight: (!) 136.4 kg (300 lb 11.3 oz)  Body mass index is 50.04 kg/m².    Intake/Output Summary (Last 24 hours) at 3/21/2023 1229  Last data filed at 3/21/2023 0345  Gross per 24 hour   Intake 410.42 ml   Output --   Net 410.42 ml      Physical Exam  Vitals and nursing note reviewed.   Constitutional:       General: She is not in acute distress.     Appearance: She is obese. She is not toxic-appearing or diaphoretic.   HENT:      Head: Normocephalic and atraumatic.      Nose: Nose normal.      Mouth/Throat:      Mouth: Mucous membranes are moist.   Eyes:      Pupils: Pupils are equal, round, and reactive to light.   Cardiovascular:      Rate and Rhythm: Normal rate and regular rhythm.      Pulses: Normal pulses.   Pulmonary:      Effort: Pulmonary effort is normal. No respiratory distress.      Breath sounds: No wheezing, rhonchi or  rales.   Abdominal:      General: Bowel sounds are normal. There is no distension.      Palpations: Abdomen is soft.      Tenderness: There is no abdominal tenderness. There is no guarding.   Genitourinary:     Comments: deferred  Musculoskeletal:         General: No swelling, tenderness or deformity. Normal range of motion.      Cervical back: Normal range of motion.   Skin:     General: Skin is warm and dry.      Capillary Refill: Capillary refill takes less than 2 seconds.   Neurological:      General: No focal deficit present.      Mental Status: She is alert and oriented to person, place, and time.      Sensory: No sensory deficit.      Motor: No weakness.   Psychiatric:         Mood and Affect: Mood normal.         Behavior: Behavior normal.       Significant Labs: All pertinent labs within the past 24 hours have been reviewed.  BMP:   Recent Labs   Lab 03/21/23  0405   GLU 90      K 4.0      CO2 23   BUN 12   CREATININE 1.0   CALCIUM 8.7   MG 2.0     CBC:   Recent Labs   Lab 03/20/23 1958 03/21/23  0405   WBC 8.62 8.30   HGB 6.9* 7.2*   HCT 22.9* 23.6*    309       Significant Imaging: I have reviewed all pertinent imaging results/findings within the past 24 hours.      Assessment/Plan:      * Symptomatic anemia  Patient's anemia is currently uncontrolled. Currently receiving 1 units of PRBCs. Etiology likely d/t AUB.  Current CBC reviewed-   Lab Results   Component Value Date    HGB 7.2 (L) 03/21/2023    HCT 23.6 (L) 03/21/2023     Monitor serial CBC and transfuse if patient becomes hemodynamically unstable, symptomatic or H/H drops below 7/21.   - Hgb 7.2 s/p transfusion, continue to monitor Hgb   - anemia panel reviewed  - started oral iron  - given IV iron repletion   - scheduled colace for constipation    IIH (idiopathic intracranial hypertension)  -Followed by neurology and ophthalmology outpatient.  -Not currently taking diamox.    Morbid obesity with BMI of 45.0-49.9, adult  Body  mass index is 50.04 kg/m². Morbid obesity complicates all aspects of disease management from diagnostic modalities to treatment. Weight loss encouraged and health benefits explained to patient.     Abnormal uterine bleeding (AUB)  Patient reports having vaginal bleeding x3 weeks. She reports experiencing similar symptoms last year, notes menstrual cycle lasted for 3 months. Not currently on OCP.   -Could consider adding provera if bleeding does not continue to improve.  -Previously seen by OB/GYN and EmBx done which showed no endometrial polyps or evidence of chronic endometritis identified. Negative for hyperplasia or carcinoma.  -Patient offered mirena as well but was not interested at the time.   -Previous pelvic US reviewed- no acute process seen. Intramural leiomyoma in the uterine body abuts the endometrial canal.  -OBGYN consulted, appreciate assistance  -Would benefit from outpatient follow up with OB/GYN.      VTE Risk Mitigation (From admission, onward)         Ordered     Reason for No Pharmacological VTE Prophylaxis  Once        Question:  Reasons:  Answer:  Active Bleeding    03/20/23 2110     IP VTE HIGH RISK PATIENT  Once         03/20/23 2110     Place sequential compression device  Until discontinued         03/20/23 2110                Discharge Planning   CLOTILDE: 3/22/2023     Code Status: Full Code   Is the patient medically ready for discharge?: No    Reason for patient still in hospital (select all that apply): Patient trending condition, Laboratory test, Treatment and Consult recommendations  Discharge Plan A: Home            Brittany Hernandez PA-C  Department of Hospital Medicine   [facility]

## 2023-03-21 NOTE — ASSESSMENT & PLAN NOTE
Body mass index is 50.04 kg/m². Morbid obesity complicates all aspects of disease management from diagnostic modalities to treatment. Weight loss encouraged and health benefits explained to patient.

## 2023-03-21 NOTE — HOSPITAL COURSE
Pinky Kate is a 32 y.o. female admitted to observation for symptomatic anemia. Hgb 6.9>>7.2 after 1 unit pRBCS. OBGYN consulted for continued vaginal bleeding. Prescribed provera 20 mg daily and will see patient in clinic outpatient. Repeat Hgb  uptrending. Patient is medically ready for discharge. All questions answered at bedside with verbal understanding. Iron supplementation and colace prescribed. Return precautions given.

## 2023-03-21 NOTE — ASSESSMENT & PLAN NOTE
Patient reports having vaginal bleeding x3 weeks. She reports experiencing similar symptoms last year, notes menstrual cycle lasted for 3 months. Not currently on OCP.   -Could consider adding provera if bleeding does not continue to improve.  -Previously seen by OB/GYN and EmBx done which showed no endometrial polyps or evidence of chronic endometritis identified. Negative for hyperplasia or carcinoma.  -Patient offered mirena as well but was not interested at the time.   -Previous pelvic US reviewed- no acute process seen. Intramural leiomyoma in the uterine body abuts the endometrial canal.  -Would benefit from outpatient follow up with OB/GYN.

## 2023-03-21 NOTE — PLAN OF CARE
[facility]  Discharge Assessment    Primary Care Provider: Primary Doctor No     Discharge Assessment (most recent)       BRIEF DISCHARGE ASSESSMENT - 03/21/23 1344          Discharge Planning    Assessment Type Discharge Planning Brief Assessment     Resource/Environmental Concerns none     Support Systems Family members;Parent     Equipment Currently Used at Home none     Current Living Arrangements home     Patient/Family Anticipates Transition to home     Patient/Family Anticipated Services at Transition none     DME Needed Upon Discharge  none     Discharge Plan A Home     Discharge Plan B Home                     Pt is a 32 y.o. female admitted with symptomatic anemia and has a PMH of anemia and AUB. She lives with her mother in a single story house with no steps to enter. She has not required DME in the past and is independent with her ADLs and IADLs and drives. [facility] given to patient and/or family with understanding verbalized.   name and number and estimated discharge date written on white board in patient's room with request to call for any questions or concerns.  Will continue to follow for needs.  Barry Campos RN,BSN

## 2023-03-21 NOTE — ASSESSMENT & PLAN NOTE
Body mass index is 46.43 kg/m². Morbid obesity complicates all aspects of disease management from diagnostic modalities to treatment. Weight loss encouraged and health benefits explained to patient.

## 2023-03-21 NOTE — HPI
Pinky Kate is a 32 y.o. female with a PMHx of morbid obesity, anemia, and AUB who presents to the ED with complaints of vaginal bleeding x3 weeks. She reports experiencing similar symptoms last year, notes menstrual cycle lasted for 3 months. She endorses lightheadedness, fatigue, chills, ESCOBAR, rapid heart beat, and headache. Denies receiving a blood transfusion in the past. She states after her last pregnancy her menstrual cycles have been irregular. The patient states she was initially saturating 6-7 tampons every hour which has improved to about 1 pad every 2 hours. Patient denies any CP, SOB, cough, fever, N/V/D, abdomina pain    In the ED, patient initially tachycardic which improved with IVF, otherwise VSSAF. Hgb 6.9 (bl 10-10.2). BMP unremarkable. UPT negative. The patient received 1L IVF.

## 2023-03-21 NOTE — SUBJECTIVE & OBJECTIVE
Past Medical History:   Diagnosis Date    Anxiety     Pseudotumor cerebri        Past Surgical History:   Procedure Laterality Date     SECTION      x2    TONSILLECTOMY         Review of patient's allergies indicates:   Allergen Reactions    Pcn [penicillins] Swelling       No current facility-administered medications on file prior to encounter.     Current Outpatient Medications on File Prior to Encounter   Medication Sig    acetaZOLAMIDE (DIAMOX) 500 mg CpSR Take 1 capsule (500 mg total) by mouth 2 (two) times daily. (Patient not taking: Reported on 2022)     Family History       Problem Relation (Age of Onset)    Cancer Father          Tobacco Use    Smoking status: Some Days     Types: Cigarettes    Smokeless tobacco: Never   Substance and Sexual Activity    Alcohol use: Yes     Comment: occas    Drug use: Yes     Types: Marijuana    Sexual activity: Yes     Partners: Male     Birth control/protection: OCP     Review of Systems   Constitutional:  Positive for chills and fatigue. Negative for appetite change, diaphoresis and fever.   HENT:  Negative for congestion, rhinorrhea and sore throat.    Eyes:  Negative for photophobia and visual disturbance.   Respiratory:  Negative for cough, shortness of breath and wheezing.         +ESCOBAR   Cardiovascular:  Positive for palpitations. Negative for chest pain and leg swelling.   Gastrointestinal:  Negative for abdominal distention, abdominal pain, diarrhea, nausea and vomiting.   Genitourinary:  Positive for vaginal bleeding. Negative for dysuria and frequency.   Musculoskeletal:  Negative for arthralgias, back pain and myalgias.   Skin:  Negative for color change, pallor, rash and wound.   Neurological:  Positive for light-headedness and headaches. Negative for dizziness, syncope and weakness.   Psychiatric/Behavioral:  Negative for confusion and hallucinations. The patient is not nervous/anxious.    Objective:     Vital Signs (Most Recent):  Temp: 98.7  °F (37.1 °C) (03/20/23 2022)  Pulse: 92 (03/20/23 2022)  Resp: 14 (03/20/23 2022)  BP: 133/81 (03/20/23 2022)  SpO2: 98 % (03/20/23 2022) Vital Signs (24h Range):  Temp:  [97.8 °F (36.6 °C)-98.7 °F (37.1 °C)] 98.7 °F (37.1 °C)  Pulse:  [] 92  Resp:  [14-18] 14  SpO2:  [98 %-100 %] 98 %  BP: (133-157)/(81-91) 133/81     Weight: 126.6 kg (279 lb)  Body mass index is 46.43 kg/m².    Physical Exam  Vitals and nursing note reviewed.   Constitutional:       General: She is not in acute distress.     Appearance: She is obese. She is not toxic-appearing or diaphoretic.   HENT:      Head: Normocephalic and atraumatic.      Nose: Nose normal.      Mouth/Throat:      Mouth: Mucous membranes are moist.   Eyes:      Pupils: Pupils are equal, round, and reactive to light.   Cardiovascular:      Rate and Rhythm: Normal rate and regular rhythm.      Pulses: Normal pulses.   Pulmonary:      Effort: Pulmonary effort is normal. No respiratory distress.      Breath sounds: No wheezing, rhonchi or rales.   Abdominal:      General: Bowel sounds are normal. There is no distension.      Palpations: Abdomen is soft.      Tenderness: There is no abdominal tenderness. There is no guarding.   Genitourinary:     Comments: deferred  Musculoskeletal:         General: No swelling, tenderness or deformity. Normal range of motion.      Cervical back: Normal range of motion.   Skin:     General: Skin is warm and dry.      Capillary Refill: Capillary refill takes less than 2 seconds.      Coloration: Skin is pale.   Neurological:      General: No focal deficit present.      Mental Status: She is alert and oriented to person, place, and time.      Sensory: No sensory deficit.      Motor: No weakness.   Psychiatric:         Mood and Affect: Mood normal.         Behavior: Behavior normal.         CRANIAL NERVES     CN III, IV, VI   Pupils are equal, round, and reactive to light.     Significant Labs: All pertinent labs within the past 24 hours have  been reviewed.  BMP:   Recent Labs   Lab 03/20/23 1958   GLU 88      K 4.3      CO2 18*   BUN 9   CREATININE 1.0   CALCIUM 9.0     CBC:   Recent Labs   Lab 03/20/23 1958   WBC 8.62   HGB 6.9*   HCT 22.9*          Significant Imaging: I have reviewed all pertinent imaging results/findings within the past 24 hours.

## 2023-03-21 NOTE — ASSESSMENT & PLAN NOTE
Patient's anemia is currently uncontrolled. Currently receiving 1 units of PRBCs. Etiology likely d/t AUB.  Current CBC reviewed-   Lab Results   Component Value Date    HGB 6.9 (L) 03/20/2023    HCT 22.9 (L) 03/20/2023     Monitor serial CBC and transfuse if patient becomes hemodynamically unstable, symptomatic or H/H drops below 7/21.   -Anemia panel.  -Start oral iron.

## 2023-03-21 NOTE — ED PROVIDER NOTES
SCRIBE #1 NOTE: I, Beth Peña, am scribing for, and in the presence of,  Monster Jesus DO. I have scribed the following portions of the note - Other sections scribed: HPI,PE.       Source of History:  The patient    Chief complaint:  Vaginal Bleeding (Since 3/6. Denies pain, reports getting more fatigued. Pt. Reports she doesn't follow with an Gyn DR. )      HPI:  Pinky Kate is a 32 y.o. female presenting with vaginal bleeding for the past 2 weeks. LMP 3/. She report experiencing similar symptoms last year, notes menstrual cycle lasted for 3 months. She affirms rapid heart beat, headache, eye pain, and feeling fatigue. Denies receiving a blood transfusion in the past. The patient denies following up with a ob/gyn to help alleviate her symptoms. She states after her last pregnancy her menstrual cycles have been irregular. The patient states saturating 6-7 tampons every hour since her symptoms started. Patient denies generalized pain or other associated symptoms.    This is the extent to the patients complaints today here in the emergency department.    ROS:   See HPI.    Review of patient's allergies indicates:   Allergen Reactions    Pcn [penicillins] Swelling       PMH:  As per HPI and below:  Past Medical History:   Diagnosis Date    Anxiety     Pseudotumor cerebri      Past Surgical History:   Procedure Laterality Date     SECTION      x2    TONSILLECTOMY         Social History     Tobacco Use    Smoking status: Some Days     Types: Cigarettes    Smokeless tobacco: Never   Substance Use Topics    Alcohol use: Yes     Comment: occas    Drug use: Yes     Types: Marijuana       Physical Exam:    /81 (BP Location: Right arm, Patient Position: Sitting)   Pulse 92   Temp 98.7 °F (37.1 °C) (Oral)   Resp 14   Wt 126.6 kg (279 lb)   SpO2 98%   BMI 46.43 kg/m²   Nurse's notes vitals reviewed  Constitutional: No acute distress.  Nontoxic  GI: Soft nontender nondistended bowel sounds  normal, no guarding, no rebound, on peritoneal  : Mild amount of active bleeding. The os is closed. Vaginal mucosa normal. No clots or abnormal tissue seen. No discharge.  Musculoskeletal: No obvious deformities  Skin: warm and dry. No rashes seen.  Neuro: Neuro: Alert. No focal deficits.            I decided to obtain the patient's medical records.  Summary of Medical Records:  2022 reviewed office visit with Gynecology regarding heavy.  Treatment.  They discussed IUD placement.  Reviewing records no records of follow-up regarding IUD placement.    MDM/ Differential Dx:  32-year-old female presents with proximally 14 days of vaginal bleeding.  Also complains of some exertional shortness of breath and fatigue.  Concerned of anemia that is now becoming symptomatic.  Will get blood work to evaluate for this.  Examination reveals no significant bleeding.  She currently is not on any birth control or any other medication to control significant bleeding.  She denies any significant pain.  Possibility of fibroids.  Do not feel any ultrasound is indicated at this time.  Will check a pregnancy test to rule out threatened /ectopic pregnancy.    Social Concerns:      ED Course as of 03/20/23 2109   Mon Mar 20, 2023   1943 Preg Test, Ur: Negative []    Pulse: 101  Will give IVF []    Hemoglobin(!): 6.9  1 yr ago 10.0. Will patient being symptomatic anemia and this level will need blood transfusion. []    Will admit to Dr. Denise Bryan.  Discussed the findings with the patient and consented for blood transfusion.  Answered all questions. []      ED Course User Index  [] Monster Jesus DO              Scribe Attestation:   Scribe #1: I performed the above scribed service and the documentation accurately describes the services I performed. I attest to the accuracy of the note.    Attending Attestation:           Physician Attestation for Scribe:  Physician Attestation Statement for  Scribe #1: I, Monster Jesus DO, reviewed documentation, as scribed by Beth Peña in my presence, and it is both accurate and complete.         Diagnostic Impression:    1. Symptomatic anemia    2. Vaginal bleeding         ED Disposition Condition    Observation Stable                  Monster Jesus DO  03/20/23 5942

## 2023-03-21 NOTE — H&P
[facility] - Emergency Dept  [facility]  History & Physical    Patient Name: Pinky Kate  MRN: [id number]  Patient Class: Emergency  Admission Date: 3/20/2023  Attending Physician: Denise Bryan MD   Primary Care Provider: Primary Doctor No         Patient information was obtained from patient, past medical records, and ER records.     Subjective:     Principal Problem:Symptomatic anemia    Chief Complaint:   Chief Complaint   Patient presents with    Vaginal Bleeding     Since 3/6. Denies pain, reports getting more fatigued. Pt. Reports she doesn't follow with an Gyn DR.         HPI: Pinky Kate is a 32 y.o. female with a PMHx of morbid obesity, anemia, and AUB who presents to the ED with complaints of vaginal bleeding x3 weeks. She reports experiencing similar symptoms last year, notes menstrual cycle lasted for 3 months. She endorses lightheadedness, fatigue, chills, ESCOBAR, rapid heart beat, and headache. Denies receiving a blood transfusion in the past. She states after her last pregnancy her menstrual cycles have been irregular. The patient states she was initially saturating 6-7 tampons every hour which has improved to about 1 pad every 2 hours. Patient denies any CP, SOB, cough, fever, N/V/D, abdomina pain    In the ED, patient initially tachycardic which improved with IVF, otherwise VSSAF. Hgb 6.9 (bl 10-10.2). BMP unremarkable. UPT negative. The patient received 1L IVF.    Past Medical History:   Diagnosis Date    Anxiety     Pseudotumor cerebri        Past Surgical History:   Procedure Laterality Date     SECTION      x2    TONSILLECTOMY         Review of patient's allergies indicates:   Allergen Reactions    Pcn [penicillins] Swelling       No current facility-administered medications on file prior to encounter.     Current Outpatient Medications on File Prior to Encounter   Medication Sig    acetaZOLAMIDE (DIAMOX) 500 mg CpSR Take 1 capsule (500 mg total) by mouth 2 (two) times daily.  (Patient not taking: Reported on 5/13/2022)     Family History       Problem Relation (Age of Onset)    Cancer Father          Tobacco Use    Smoking status: Some Days     Types: Cigarettes    Smokeless tobacco: Never   Substance and Sexual Activity    Alcohol use: Yes     Comment: occas    Drug use: Yes     Types: Marijuana    Sexual activity: Yes     Partners: Male     Birth control/protection: OCP     Review of Systems   Constitutional:  Positive for chills and fatigue. Negative for appetite change, diaphoresis and fever.   HENT:  Negative for congestion, rhinorrhea and sore throat.    Eyes:  Negative for photophobia and visual disturbance.   Respiratory:  Negative for cough, shortness of breath and wheezing.         +ESCOBAR   Cardiovascular:  Positive for palpitations. Negative for chest pain and leg swelling.   Gastrointestinal:  Negative for abdominal distention, abdominal pain, diarrhea, nausea and vomiting.   Genitourinary:  Positive for vaginal bleeding. Negative for dysuria and frequency.   Musculoskeletal:  Negative for arthralgias, back pain and myalgias.   Skin:  Negative for color change, pallor, rash and wound.   Neurological:  Positive for light-headedness and headaches. Negative for dizziness, syncope and weakness.   Psychiatric/Behavioral:  Negative for confusion and hallucinations. The patient is not nervous/anxious.    Objective:     Vital Signs (Most Recent):  Temp: 98.7 °F (37.1 °C) (03/20/23 2022)  Pulse: 92 (03/20/23 2022)  Resp: 14 (03/20/23 2022)  BP: 133/81 (03/20/23 2022)  SpO2: 98 % (03/20/23 2022) Vital Signs (24h Range):  Temp:  [97.8 °F (36.6 °C)-98.7 °F (37.1 °C)] 98.7 °F (37.1 °C)  Pulse:  [] 92  Resp:  [14-18] 14  SpO2:  [98 %-100 %] 98 %  BP: (133-157)/(81-91) 133/81     Weight: 126.6 kg (279 lb)  Body mass index is 46.43 kg/m².    Physical Exam  Vitals and nursing note reviewed.   Constitutional:       General: She is not in acute distress.     Appearance: She is obese. She is  not toxic-appearing or diaphoretic.   HENT:      Head: Normocephalic and atraumatic.      Nose: Nose normal.      Mouth/Throat:      Mouth: Mucous membranes are moist.   Eyes:      Pupils: Pupils are equal, round, and reactive to light.   Cardiovascular:      Rate and Rhythm: Normal rate and regular rhythm.      Pulses: Normal pulses.   Pulmonary:      Effort: Pulmonary effort is normal. No respiratory distress.      Breath sounds: No wheezing, rhonchi or rales.   Abdominal:      General: Bowel sounds are normal. There is no distension.      Palpations: Abdomen is soft.      Tenderness: There is no abdominal tenderness. There is no guarding.   Genitourinary:     Comments: deferred  Musculoskeletal:         General: No swelling, tenderness or deformity. Normal range of motion.      Cervical back: Normal range of motion.   Skin:     General: Skin is warm and dry.      Capillary Refill: Capillary refill takes less than 2 seconds.      Coloration: Skin is pale.   Neurological:      General: No focal deficit present.      Mental Status: She is alert and oriented to person, place, and time.      Sensory: No sensory deficit.      Motor: No weakness.   Psychiatric:         Mood and Affect: Mood normal.         Behavior: Behavior normal.         CRANIAL NERVES     CN III, IV, VI   Pupils are equal, round, and reactive to light.     Significant Labs: All pertinent labs within the past 24 hours have been reviewed.  BMP:   Recent Labs   Lab 03/20/23 1958   GLU 88      K 4.3      CO2 18*   BUN 9   CREATININE 1.0   CALCIUM 9.0     CBC:   Recent Labs   Lab 03/20/23 1958   WBC 8.62   HGB 6.9*   HCT 22.9*          Significant Imaging: I have reviewed all pertinent imaging results/findings within the past 24 hours.  Assessment/Plan:     * Symptomatic anemia  Patient's anemia is currently uncontrolled. Currently receiving 1 units of PRBCs. Etiology likely d/t AUB.  Current CBC reviewed-   Lab Results   Component  Value Date    HGB 6.9 (L) 03/20/2023    HCT 22.9 (L) 03/20/2023     Monitor serial CBC and transfuse if patient becomes hemodynamically unstable, symptomatic or H/H drops below 7/21.   -Anemia panel.  -Start oral iron.    Abnormal uterine bleeding (AUB)  Patient reports having vaginal bleeding x3 weeks. She reports experiencing similar symptoms last year, notes menstrual cycle lasted for 3 months. Not currently on OCP.   -Could consider adding provera if bleeding does not continue to improve.  -Previously seen by OB/GYN and EmBx done which showed no endometrial polyps or evidence of chronic endometritis identified. Negative for hyperplasia or carcinoma.  -Patient offered mirena as well but was not interested at the time.   -Previous pelvic US reviewed- no acute process seen. Intramural leiomyoma in the uterine body abuts the endometrial canal.  -Would benefit from outpatient follow up with OB/GYN.    IIH (idiopathic intracranial hypertension)  -Followed by neurology and ophthalmology outpatient.  -Not currently taking diamox.    Morbid obesity with BMI of 45.0-49.9, adult  Body mass index is 46.43 kg/m². Morbid obesity complicates all aspects of disease management from diagnostic modalities to treatment. Weight loss encouraged and health benefits explained to patient.       VTE Risk Mitigation (From admission, onward)           Ordered     Reason for No Pharmacological VTE Prophylaxis  Once        Question:  Reasons:  Answer:  Active Bleeding    03/20/23 2110     IP VTE HIGH RISK PATIENT  Once         03/20/23 2110     Place sequential compression device  Until discontinued         03/20/23 2110                         On 03/20/2023, patient should be placed in hospital observation services under my care in collaboration with David Orellana MD.      Alisa Hernadez NP  Department of Hospital Medicine  [facility] - Emergency Dept

## 2023-03-21 NOTE — ASSESSMENT & PLAN NOTE
Patient's anemia is currently uncontrolled. Currently receiving 1 units of PRBCs. Etiology likely d/t AUB.  Current CBC reviewed-   Lab Results   Component Value Date    HGB 7.2 (L) 03/21/2023    HCT 23.6 (L) 03/21/2023     Monitor serial CBC and transfuse if patient becomes hemodynamically unstable, symptomatic or H/H drops below 7/21.   - Hgb 7.2 s/p transfusion, continue to monitor Hgb   - anemia panel reviewed  - started oral iron  - given IV iron repletion   - scheduled colace for constipation

## 2023-03-22 ENCOUNTER — TELEPHONE (OUTPATIENT)
Dept: OBSTETRICS AND GYNECOLOGY | Facility: CLINIC | Age: 33
End: 2023-03-22
Payer: MEDICAID

## 2023-03-22 NOTE — PLAN OF CARE
[facility] - Observation 11H  Discharge Final Note    Primary Care Provider: Primary Doctor No    Expected Discharge Date: 3/21/2023    Future Appointments   Date Time Provider Department Center   3/30/2023  3:45 PM GYN RESIDENT CLINIC [id number]     Pt discharged home with no services.  Barry Campos, RN,BSN        Final Discharge Note (most recent)       Final Note - 03/22/23 0847          Final Note    Assessment Type Final Discharge Note     Anticipated Discharge Disposition Home or Self Care     Hospital Resources/Appts/Education Provided Provided patient/caregiver with written discharge plan information;Appointments scheduled and added to AVS        Post-Acute Status    Discharge Delays None known at this time                     Important Message from Medicare

## 2023-03-23 NOTE — DISCHARGE SUMMARY
[facility] - Observation [address] [place] [address] Medicine  Discharge Summary      Patient Name: Pinky Kate  MRN: [id number]  BALA: [id number]  Patient Class: OP- Observation  Admission Date: 3/20/2023  Hospital Length of Stay: 0 days  Discharge Date and Time:  03/23/2023 1:23 PM  Attending Physician: Katrina att. providers found   Discharging Provider: Brittany Hernandez PA-C  Primary Care Provider: Primary Doctor Katrina  [facility] Medicine Team: [facility] E Brittany Hernandez PA-C  Primary Care Team: [facility] E    HPI:   Pinky Kate is a 32 y.o. female with a PMHx of morbid obesity, anemia, and AUB who presents to the ED with complaints of vaginal bleeding x3 weeks. She reports experiencing similar symptoms last year, notes menstrual cycle lasted for 3 months. She endorses lightheadedness, fatigue, chills, ESCOBAR, rapid heart beat, and headache. Denies receiving a blood transfusion in the past. She states after her last pregnancy her menstrual cycles have been irregular. The patient states she was initially saturating 6-7 tampons every hour which has improved to about 1 pad every 2 hours. Patient denies any CP, SOB, cough, fever, N/V/D, abdomina pain    In the ED, patient initially tachycardic which improved with IVF, otherwise VSSAF. Hgb 6.9 (bl 10-10.2). BMP unremarkable. UPT negative. The patient received 1L IVF.      * No surgery found *      Hospital Course:   Pinky Kate is a 32 y.o. female admitted to observation for symptomatic anemia. Hgb 6.9>>7.2 after 1 unit pRBCS. OBGYN consulted for continued vaginal bleeding. Prescribed provera 20 mg daily and will see patient in clinic outpatient. Repeat Hgb  uptrending. Patient is medically ready for discharge. All questions answered at bedside with verbal understanding. Iron supplementation and colace prescribed. Return precautions given.        Goals of Care Treatment Preferences:  Code Status: Full Code      Consults:   Consults (From admission, onward)        Status Ordering  Provider     Inpatient consult to Obstetrics  Once        Provider:  (Not yet assigned)    JAIRO Brennan          No new Assessment & Plan notes have been filed under this hospital service since the last note was generated.  Service: Hospital Medicine    Final Active Diagnoses:    Diagnosis Date Noted POA    PRINCIPAL PROBLEM:  Symptomatic anemia [D64.9] 03/20/2023 Yes    IIH (idiopathic intracranial hypertension) [G93.2] 03/21/2023 Yes    Abnormal uterine bleeding (AUB) [N93.9] 03/20/2023 Yes    Morbid obesity with BMI of 45.0-49.9, adult [E66.01, Z68.42] 03/20/2023 Not Applicable      Problems Resolved During this Admission:       Discharged Condition: stable    Disposition: Home or Self Care    Follow Up:    Patient Instructions:      Ambulatory referral/consult to Obstetrics / Gynecology   Standing Status: Future   Referral Priority: Routine Referral Type: Consultation   Referral Reason: Specialty Services Required   Requested Specialty: Obstetrics and Gynecology   Number of Visits Requested: 1     Diet Adult Regular     Activity as tolerated       Pending Diagnostic Studies:     Procedure Component Value Units Date/Time    HIV 1/2 Ag/Ab (4th Gen) [[id number]] Collected: 03/20/23 1958    Order Status: Sent Lab Status: In process Updated: 03/20/23 1958    Specimen: Blood     Hepatitis C Antibody [[id number]] Collected: 03/20/23 1958    Order Status: Sent Lab Status: In process Updated: 03/20/23 1958    Specimen: Blood          Medications:  Reconciled Home Medications:      Medication List      START taking these medications    docusate sodium 50 MG capsule  Commonly known as: COLACE  Take 1 capsule (50 mg total) by mouth 2 (two) times daily as needed for Constipation.     ferrous sulfate 325 (65 FE) MG EC tablet  Take 1 tablet (325 mg total) by mouth once daily.     medroxyPROGESTERone 10 MG tablet  Commonly known as: PROVERA  Take 2 tablets (20 mg total) by mouth once daily.        ASK your  doctor about these medications    acetaZOLAMIDE 500 mg Cpsr  Commonly known as: DIAMOX  Take 1 capsule (500 mg total) by mouth 2 (two) times daily.            Indwelling Lines/Drains at time of discharge:   Lines/Drains/Airways     None                 Time spent on the discharge of patient: 36 minutes         Brittany Hernandez PA-C  Department of Hospital Medicine  [facility] - Observation 11H

## 2023-03-30 ENCOUNTER — OFFICE VISIT (OUTPATIENT)
Dept: OBSTETRICS AND GYNECOLOGY | Facility: CLINIC | Age: 33
End: 2023-03-30
Payer: MEDICAID

## 2023-03-30 VITALS
WEIGHT: 291.25 LBS | DIASTOLIC BLOOD PRESSURE: 84 MMHG | SYSTOLIC BLOOD PRESSURE: 130 MMHG | BODY MASS INDEX: 48.53 KG/M2 | HEIGHT: 65 IN

## 2023-03-30 DIAGNOSIS — N93.9 ABNORMAL UTERINE BLEEDING (AUB): Primary | ICD-10-CM

## 2023-03-30 PROCEDURE — 3079F DIAST BP 80-89 MM HG: CPT | Mod: CPTII,,, | Performed by: OBSTETRICS & GYNECOLOGY

## 2023-03-30 PROCEDURE — 99999 PR PBB SHADOW E&M-EST. PATIENT-LVL III: CPT | Mod: PBBFAC,,,

## 2023-03-30 PROCEDURE — 99214 OFFICE O/P EST MOD 30 MIN: CPT | Mod: S$PBB,,, | Performed by: OBSTETRICS & GYNECOLOGY

## 2023-03-30 PROCEDURE — 3075F PR MOST RECENT SYSTOLIC BLOOD PRESS GE 130-139MM HG: ICD-10-PCS | Mod: CPTII,,, | Performed by: OBSTETRICS & GYNECOLOGY

## 2023-03-30 PROCEDURE — 3008F PR BODY MASS INDEX (BMI) DOCUMENTED: ICD-10-PCS | Mod: CPTII,,, | Performed by: OBSTETRICS & GYNECOLOGY

## 2023-03-30 PROCEDURE — 99213 OFFICE O/P EST LOW 20 MIN: CPT | Mod: PBBFAC

## 2023-03-30 PROCEDURE — 3075F SYST BP GE 130 - 139MM HG: CPT | Mod: CPTII,,, | Performed by: OBSTETRICS & GYNECOLOGY

## 2023-03-30 PROCEDURE — 99214 PR OFFICE/OUTPT VISIT, EST, LEVL IV, 30-39 MIN: ICD-10-PCS | Mod: S$PBB,,, | Performed by: OBSTETRICS & GYNECOLOGY

## 2023-03-30 PROCEDURE — 1160F PR REVIEW ALL MEDS BY PRESCRIBER/CLIN PHARMACIST DOCUMENTED: ICD-10-PCS | Mod: CPTII,,, | Performed by: OBSTETRICS & GYNECOLOGY

## 2023-03-30 PROCEDURE — 1159F PR MEDICATION LIST DOCUMENTED IN MEDICAL RECORD: ICD-10-PCS | Mod: CPTII,,, | Performed by: OBSTETRICS & GYNECOLOGY

## 2023-03-30 PROCEDURE — 1160F RVW MEDS BY RX/DR IN RCRD: CPT | Mod: CPTII,,, | Performed by: OBSTETRICS & GYNECOLOGY

## 2023-03-30 PROCEDURE — 3079F PR MOST RECENT DIASTOLIC BLOOD PRESSURE 80-89 MM HG: ICD-10-PCS | Mod: CPTII,,, | Performed by: OBSTETRICS & GYNECOLOGY

## 2023-03-30 PROCEDURE — 1159F MED LIST DOCD IN RCRD: CPT | Mod: CPTII,,, | Performed by: OBSTETRICS & GYNECOLOGY

## 2023-03-30 PROCEDURE — 3008F BODY MASS INDEX DOCD: CPT | Mod: CPTII,,, | Performed by: OBSTETRICS & GYNECOLOGY

## 2023-03-30 PROCEDURE — 99999 PR PBB SHADOW E&M-EST. PATIENT-LVL III: ICD-10-PCS | Mod: PBBFAC,,,

## 2023-03-30 NOTE — PROGRESS NOTES
"Past medical, surgical, social, family, and obstetric histories; medications; prior records and results; and available outside records were reviewed and updated in the EMR.  Pertinent findings were noted below.    Reason for Visit   AUB follow up    HPI   32 y.o. female  presents for follow up of AUB after recent admission for symptomatic anemia. She reports that her cycles have been heavy for the past six years, but began having shortness of breath last week and presented to the ED. She received 1u pRBCs and was discharged home on provera taper after H/H was stable. She reports she has been feeling much better and her bleeding is improved today, however, she missed a pill this past weekend and had some spotting.     She was most recently evaluated for this issue in 2023 where EMBx was unremarkable and TVUS showed 3cm intramural fibroid near endometrial canal. While inpatient she discussed endometrial ablation or hysterectomy for management, and is amenable to both options.     Patient's last menstrual period was 2023 (exact date).    Contraception: None  Pap: 2022, NILM/HPV(-)  Mammogram: N/A    Exam   /84   Ht 5' 5" (1.651 m)   Wt 132.1 kg (291 lb 3.6 oz)   LMP 2023 (Exact Date)   BMI 48.46 kg/m²     Physical Exam  Constitutional:       General: She is not in acute distress.     Appearance: Normal appearance. She is well-developed.   HENT:      Head: Normocephalic and atraumatic.   Eyes:      Extraocular Movements: Extraocular movements intact.      Pupils: Pupils are equal, round, and reactive to light.   Pulmonary:      Effort: No respiratory distress.   Neurological:      General: No focal deficit present.      Mental Status: She is oriented to person, place, and time.   Psychiatric:         Mood and Affect: Mood normal.         Behavior: Behavior normal.   Vitals reviewed.     Abnormal uterine bleeding (AUB)  -     US Pelvis Comp with Transvag NON-OB (xpd; Future; Expected " date: 03/30/2023      Patient counseled regarding various medical and surgical options for treatment of AUB. R/B/A of IUD insertion, endometrial ablation, hysteroscopy D&C, and hysterectomy were all discussed. At this time the plan of care is to have repeat TVUS for better evaluation of fibroid size for possible hysteroscopy, dilation, and curettage. Will follow up with patient after resulted for further discussion of management options.   Patient will continue daily provera until TVUS completed and patient's surgery date occurs.     Michelle Ward MD  OB/GYN PGY-1

## 2023-04-03 NOTE — PROGRESS NOTES
I have reviewed the notes, assessments, and/or procedures performed by resident, I concur with her/his documentation of Pinky Kate.

## 2023-04-06 ENCOUNTER — HOSPITAL ENCOUNTER (OUTPATIENT)
Dept: RADIOLOGY | Facility: OTHER | Age: 33
Discharge: HOME | End: 2023-04-06
Payer: MEDICAID

## 2023-04-06 DIAGNOSIS — N93.9 ABNORMAL UTERINE BLEEDING (AUB): ICD-10-CM

## 2023-04-06 PROCEDURE — 76856 US PELVIS COMP WITH TRANSVAG NON-OB (XPD): ICD-10-PCS | Mod: 26,,, | Performed by: RADIOLOGY

## 2023-04-06 PROCEDURE — 76856 US EXAM PELVIC COMPLETE: CPT | Mod: TC

## 2023-04-06 PROCEDURE — 76830 TRANSVAGINAL US NON-OB: CPT | Mod: 26,,, | Performed by: RADIOLOGY

## 2023-04-06 PROCEDURE — 76856 US EXAM PELVIC COMPLETE: CPT | Mod: 26,,, | Performed by: RADIOLOGY

## 2023-04-06 PROCEDURE — 76830 US PELVIS COMP WITH TRANSVAG NON-OB (XPD): ICD-10-PCS | Mod: 26,,, | Performed by: RADIOLOGY

## 2023-04-14 ENCOUNTER — TELEPHONE (OUTPATIENT)
Dept: OBSTETRICS AND GYNECOLOGY | Facility: CLINIC | Age: 33
End: 2023-04-14
Payer: MEDICAID

## 2023-04-14 ENCOUNTER — PATIENT MESSAGE (OUTPATIENT)
Dept: OBSTETRICS AND GYNECOLOGY | Facility: CLINIC | Age: 33
End: 2023-04-14
Payer: MEDICAID

## 2023-04-14 ENCOUNTER — TELEPHONE (OUTPATIENT)
Dept: OBSTETRICS AND GYNECOLOGY | Facility: HOSPITAL | Age: 33
End: 2023-04-14
Payer: MEDICAID

## 2023-04-14 NOTE — TELEPHONE ENCOUNTER
Called and spoke to patient regarding ultrasound results and scheduling preop visit for H-scope/D&C for possible hysteroscopic myomectomy for fibroids. Discussed that even if we are unable to removed fibroids intact, we may be able to remove portions of them to decrease patient's bleeding and will be able to obtain endometrial sampling for path analysis. All questions answered. Will message staff for scheduling.

## 2023-04-14 NOTE — TELEPHONE ENCOUNTER
Per Dr Ward, pt scheduled  for surgery consults with DR De Santiago for pre-op for H-scope/D&C/poss hysteroscopic myomectomy

## 2023-04-18 ENCOUNTER — TELEPHONE (OUTPATIENT)
Dept: OBSTETRICS AND GYNECOLOGY | Facility: CLINIC | Age: 33
End: 2023-04-18
Payer: MEDICAID

## 2023-05-04 ENCOUNTER — OFFICE VISIT (OUTPATIENT)
Dept: OBSTETRICS AND GYNECOLOGY | Facility: CLINIC | Age: 33
End: 2023-05-04
Payer: MEDICAID

## 2023-05-04 VITALS
DIASTOLIC BLOOD PRESSURE: 86 MMHG | HEIGHT: 65 IN | WEIGHT: 291.25 LBS | RESPIRATION RATE: 18 BRPM | SYSTOLIC BLOOD PRESSURE: 134 MMHG | BODY MASS INDEX: 48.53 KG/M2

## 2023-05-04 DIAGNOSIS — Z30.430 ENCOUNTER FOR INSERTION OF MIRENA IUD: ICD-10-CM

## 2023-05-04 DIAGNOSIS — E66.01 MORBID OBESITY WITH BMI OF 45.0-49.9, ADULT: ICD-10-CM

## 2023-05-04 DIAGNOSIS — D64.9 SYMPTOMATIC ANEMIA: ICD-10-CM

## 2023-05-04 DIAGNOSIS — N93.9 ABNORMAL UTERINE BLEEDING (AUB): Primary | ICD-10-CM

## 2023-05-04 DIAGNOSIS — D25.9 UTERINE LEIOMYOMA, UNSPECIFIED LOCATION: ICD-10-CM

## 2023-05-04 PROCEDURE — 3075F PR MOST RECENT SYSTOLIC BLOOD PRESS GE 130-139MM HG: ICD-10-PCS | Mod: CPTII,,,

## 2023-05-04 PROCEDURE — 99214 OFFICE O/P EST MOD 30 MIN: CPT | Mod: S$PBB,,,

## 2023-05-04 PROCEDURE — 99214 PR OFFICE/OUTPT VISIT, EST, LEVL IV, 30-39 MIN: ICD-10-PCS | Mod: S$PBB,,,

## 2023-05-04 PROCEDURE — 3008F BODY MASS INDEX DOCD: CPT | Mod: CPTII,,,

## 2023-05-04 PROCEDURE — 99999 PR PBB SHADOW E&M-EST. PATIENT-LVL III: ICD-10-PCS | Mod: PBBFAC,,,

## 2023-05-04 PROCEDURE — 99999 PR PBB SHADOW E&M-EST. PATIENT-LVL III: CPT | Mod: PBBFAC,,,

## 2023-05-04 PROCEDURE — 3079F PR MOST RECENT DIASTOLIC BLOOD PRESSURE 80-89 MM HG: ICD-10-PCS | Mod: CPTII,,,

## 2023-05-04 PROCEDURE — 3079F DIAST BP 80-89 MM HG: CPT | Mod: CPTII,,,

## 2023-05-04 PROCEDURE — 1160F RVW MEDS BY RX/DR IN RCRD: CPT | Mod: CPTII,,,

## 2023-05-04 PROCEDURE — 1159F MED LIST DOCD IN RCRD: CPT | Mod: CPTII,,,

## 2023-05-04 PROCEDURE — 3008F PR BODY MASS INDEX (BMI) DOCUMENTED: ICD-10-PCS | Mod: CPTII,,,

## 2023-05-04 PROCEDURE — 3075F SYST BP GE 130 - 139MM HG: CPT | Mod: CPTII,,,

## 2023-05-04 PROCEDURE — 1160F PR REVIEW ALL MEDS BY PRESCRIBER/CLIN PHARMACIST DOCUMENTED: ICD-10-PCS | Mod: CPTII,,,

## 2023-05-04 PROCEDURE — 1159F PR MEDICATION LIST DOCUMENTED IN MEDICAL RECORD: ICD-10-PCS | Mod: CPTII,,,

## 2023-05-04 PROCEDURE — 99213 OFFICE O/P EST LOW 20 MIN: CPT | Mod: PBBFAC

## 2023-05-04 NOTE — PROGRESS NOTES
Past medical, surgical, social, family, and obstetric histories; medications; prior records and results; and available outside records were reviewed and updated in the EMR.  Pertinent findings were noted below.    Reason for Visit   Vaginal Bleeding (Pt stated she has been bleeding for 2 months. Pt stated she went to [place] during the time of her bleeding and it stop on 23 to present , pt wants to discuss some options pt also stated she don't really want to have surgery.)    HPI     32 y.o. female  being followed for AUB after recent admission for symptomatic anemia requiring blood transfusion of 1u pRBC and provera taper. She was previously evaluated for this issue in 2023 where EMBx was unremarkable and TVUS showed 3cm intramural fibroid near endometrial canal.     She was seen in GYN resident clinic on 3/30, counseled on various medical and surgical options for management/tx of AUB, including R/B/A of IUD insertion, endometrial ablation, hysteroscopy D&C, and hysterectomy were all discussed. Repeat TVUS on  with 2 [id number] intramural uterine fibroids (see findings below for further details).     Presents today for discussion of management options. Reports VB for 2mo, however bleeding stopped on , and she has not had VB or cramping pain since. She has been compliant with QD provera and iron supplementation.     Review of Systems   Constitutional:  Negative for chills and fever.   Respiratory:  Negative for cough and shortness of breath.    Cardiovascular:  Negative for chest pain.   Gastrointestinal:  Negative for abdominal pain, constipation, diarrhea, nausea and vomiting.   Genitourinary:  Positive for menstrual problem. Negative for bladder incontinence, pelvic pain, vaginal bleeding, vaginal discharge and vaginal odor.   Integumentary:  Negative for rash.   Psychiatric/Behavioral:  Negative for depression. The patient is not nervous/anxious.      Past Medical History:   Diagnosis Date     "Anxiety     Pseudotumor cerebri 2013     Past Surgical History:   Procedure Laterality Date     SECTION      x2    TONSILLECTOMY         Social History     Tobacco Use    Smoking status: Former     Types: Cigarettes    Smokeless tobacco: Never   Substance Use Topics    Alcohol use: Yes     Comment: occas     Family History   Problem Relation Age of Onset    Cancer Father     Diabetes Neg Hx     Eclampsia Neg Hx     Hypertension Neg Hx     Miscarriages / Stillbirths Neg Hx      labor Neg Hx     Stroke Neg Hx      OB History    Para Term  AB Living   2 2       2   SAB IAB Ectopic Multiple Live Births                  # Outcome Date GA Lbr Casey/2nd Weight Sex Delivery Anes PTL Lv   2 Para            1 Para                Current Outpatient Medications   Medication Instructions    ferrous sulfate 325 mg, Oral, Daily    medroxyPROGESTERone (PROVERA) 20 mg, Oral, Daily       Pcn [penicillins]    Exam   /86   Resp 18   Ht 5' 5" (1.651 m)   Wt 132.1 kg (291 lb 3.6 oz)   LMP 2023 (Approximate)   BMI 48.46 kg/m²     Physical Exam  Constitutional:       General: She is not in acute distress.     Appearance: Normal appearance. She is obese.   HENT:      Head: Normocephalic and atraumatic.   Eyes:      Extraocular Movements: Extraocular movements intact.   Cardiovascular:      Rate and Rhythm: Normal rate.   Pulmonary:      Effort: Pulmonary effort is normal. No respiratory distress.   Abdominal:      General: Abdomen is flat.      Palpations: Abdomen is soft.   Musculoskeletal:         General: Normal range of motion.      Cervical back: Normal range of motion.   Neurological:      General: No focal deficit present.      Mental Status: She is alert and oriented to person, place, and time. Mental status is at baseline.   Skin:     General: Skin is warm and dry.   Psychiatric:         Mood and Affect: Mood normal.         Behavior: Behavior normal.         Thought Content: Thought " content normal.         Judgment: Judgment normal.       Imagin2023: US PELVIS COMP WITH TRANSVAG NON-OB (XPD)  CLINICAL HISTORY: Abnormal uterine and vaginal bleeding, unspecified  TECHNIQUE: Transabdominal sonography of the pelvis was performed, followed by transvaginal sonography to better evaluate the uterus and ovaries.  COMPARISON: 2022    FINDINGS:  Uterus:  Size: 9.8 x 7.4 x 7.9 cm  Masses: There are 2 intramural uterine fibroid it is 3.7 x 3.0 x 3.4 cm, 2.4 x 2.1 x 2.1 cm (possibly measured as 1 on the prior study)  Endometrium: Normal in this pre menopausal patient, measuring 12 mm.     Ovaries: The bilateral ovaries were not seen, the bilateral adnexa appear normal.     Free Fluid: None.     Impression: [id number], intramural uterine fibroids at the fundus.    Assessment and Plan   Abnormal uterine bleeding (AUB)    Uterine leiomyoma, unspecified location    Encounter for insertion of mirena IUD  -     Device Authorization Order    Morbid obesity with BMI of 45.0-49.9, adult  -     Ambulatory referral/consult to Nutrition Services; Future; Expected date: 2023  -     Ambulatory referral/consult to Bariatric Medicine; Future; Expected date: 2023      Counseled patient on medical and surgical options for management of AUB-L. Patient desires Mirena IUD at this time, although does express interest in UAE. Device authorization order placed. Patient scheduled for  at 2pm for IUD insertion.  Last endometrial sampling 2023 negative. D/t risk factors and continued AUB, recommend repeat EmBx at time of IUD insertion.  Expresses interest in increased support for weight loss, referrals placed as above      Brenda Huang MD   OB/GYN PGY1  [facility]

## 2023-05-05 ENCOUNTER — PATIENT MESSAGE (OUTPATIENT)
Dept: OBSTETRICS AND GYNECOLOGY | Facility: CLINIC | Age: 33
End: 2023-05-05
Payer: MEDICAID

## 2023-05-11 ENCOUNTER — PROCEDURE VISIT (OUTPATIENT)
Dept: OBSTETRICS AND GYNECOLOGY | Facility: CLINIC | Age: 33
End: 2023-05-11
Payer: MEDICAID

## 2023-05-11 VITALS
SYSTOLIC BLOOD PRESSURE: 120 MMHG | WEIGHT: 288.38 LBS | DIASTOLIC BLOOD PRESSURE: 98 MMHG | HEIGHT: 65 IN | BODY MASS INDEX: 48.05 KG/M2

## 2023-05-11 DIAGNOSIS — Z30.431 IUD CHECK UP: ICD-10-CM

## 2023-05-11 DIAGNOSIS — Z30.430 ENCOUNTER FOR IUD INSERTION: Primary | ICD-10-CM

## 2023-05-11 LAB
B-HCG UR QL: NEGATIVE
CTP QC/QA: YES

## 2023-05-11 PROCEDURE — 99499 NO LOS: ICD-10-PCS | Mod: S$PBB,,,

## 2023-05-11 PROCEDURE — 58300 INSERT INTRAUTERINE DEVICE: CPT | Mod: PBBFAC

## 2023-05-11 PROCEDURE — 99499 UNLISTED E&M SERVICE: CPT | Mod: S$PBB,,,

## 2023-05-11 PROCEDURE — 58300 INSERT INTRAUTERINE DEVICE: CPT | Mod: S$PBB,,,

## 2023-05-11 PROCEDURE — 81025 URINE PREGNANCY TEST: CPT | Mod: PBBFAC

## 2023-05-11 PROCEDURE — 58300 INSERTION OF IUD: ICD-10-PCS | Mod: S$PBB,,,

## 2023-05-11 RX ADMIN — LEVONORGESTREL 1 INTRA UTERINE DEVICE: 52 INTRAUTERINE DEVICE INTRAUTERINE at 02:05

## 2023-05-12 ENCOUNTER — NUTRITION (OUTPATIENT)
Dept: NUTRITION | Facility: CLINIC | Age: 33
End: 2023-05-12
Payer: MEDICAID

## 2023-05-12 VITALS — HEIGHT: 65 IN | WEIGHT: 290.81 LBS | BODY MASS INDEX: 48.45 KG/M2

## 2023-05-12 DIAGNOSIS — Z71.3 DIETARY COUNSELING: ICD-10-CM

## 2023-05-12 DIAGNOSIS — E66.01 MORBID OBESITY WITH BMI OF 45.0-49.9, ADULT: Primary | ICD-10-CM

## 2023-05-12 PROCEDURE — 99999 PR PBB SHADOW E&M-EST. PATIENT-LVL III: ICD-10-PCS | Mod: PBBFAC,,,

## 2023-05-12 PROCEDURE — 99213 OFFICE O/P EST LOW 20 MIN: CPT | Mod: PBBFAC

## 2023-05-12 PROCEDURE — 99999 PR PBB SHADOW E&M-EST. PATIENT-LVL III: CPT | Mod: PBBFAC,,,

## 2023-05-12 PROCEDURE — 97802 MEDICAL NUTRITION INDIV IN: CPT | Mod: PBBFAC | Performed by: DIETITIAN, REGISTERED

## 2023-05-12 NOTE — PATIENT INSTRUCTIONS
1500 calorie, low fat, high fiber diet.  Avoid sugary beverages  Exercise goal:  30 minutes per day, 3-5 days per week.

## 2023-05-12 NOTE — PROCEDURES
Insertion of IUD    Date/Time: 5/11/2023 2:00 PM  Performed by: Brenda Huang MD  Authorized by: Brenda Huang MD     Consent:     Consent obtained:  Verbal and written    Consent given by:  Patient    Procedure risks and benefits discussed: yes      Patient questions answered: yes      Patient agrees, verbalizes understanding, and wants to proceed: yes      Educational handouts given: yes    Procedure:     Negative urine pregnancy test: yes      Cervix cleaned and prepped: yes      Speculum placed in vagina: yes      Tenaculum applied to cervix: yes      Uterus sounded: yes      Uterus sound depth (cm):  12    IUD inserted with no complications: yes      Strings trimmed: yes (to 3-4cm)    1 Intra Uterine Device levonorgestreL 21 mcg/24 hours (8 yrs) 52 mg     Post-procedure:     Patient tolerated procedure well: yes      Patient will follow up after next period: yes

## 2023-05-12 NOTE — PROGRESS NOTES
"Referring Physician:Brenda Huang MD     Reason for visit:  Chief Complaint   Patient presents with    Obesity    Nutrition Counseling      Initial Visit    :1990     Allergies Reviewed  Meds Reviewed    Anthropometrics  Weight:131.9 kg (290 lb 12.6 oz)  Height:5' 5" (1.651 m)  BMI:Body mass index is 48.39 kg/m².   IBW: 57 kg  +/-10%    Meds:  Outpatient Medications Prior to Visit   Medication Sig Dispense Refill    ferrous sulfate 325 (65 FE) MG EC tablet Take 1 tablet (325 mg total) by mouth once daily. 90 tablet 3    medroxyPROGESTERone (PROVERA) 10 MG tablet Take 2 tablets (20 mg total) by mouth once daily. 60 tablet 11     Facility-Administered Medications Prior to Visit   Medication Dose Route Frequency Provider Last Rate Last Admin    levonorgestreL (MIRENA) 21 mcg/24 hours (8 yrs) 52 mg IUD 1 Intra Uterine Device  1 Intra Uterine Device Intrauterine  Brenda Huang MD   1 Intra Uterine Device at 23 1400       Food/Drug Interactions Noted:  n/a    Vitamins/Supplements/Herbs:  Fe    Labs: BMP wnl     Nutrition Prescription: 1710 Kcals/day( 30 kcal/kg IBW),  59 g protein( 1.0 g/kg IBW)     Support System:   pt is a single mom; does the grocery shopping and cooking    Diet Hx:  has been fasting, and states she has lost 5 lbs.  When busy, may only eat one meal a day, at lunch.     Breakfast: water  Lunch:  usually gets lunch out near job:  fast food; gumbo; grilled cheese; baked chicken, dwayne greens, yams.  Juice or lemonade  Dinner:  ate out last night:  shared appetizers.  Tuna and crackers.  Likes to use air fryer:  salmon, chicken.      Current activity level and/or physical limitations:  for past 6 months, new job has interfered with exercise capability.  Walks a lot at her job.    Motivation to make changes/anticipated barriers and/or expected adherence:  pt states busy lifestyle not conducive to making changes at this time    Nutrition-Focus Physical Findings:  pt appears well " nourished    Assessment:  pt stated information presented was not going to be helpful, and ended visit.    Nutrition Diagnosis: obesity RT excess energy intake and physical inactivity AEB BMI > 40    Recommendations:  1500 calorie, low fat, high fiber diet.   Avoid sugary beverages Exercise goal:  30 minutes per day, 3-5 days per week.  Pt declined written information    Strategies Implemented:  pt declined    Consultation Time:20 minutes.  Pt arrived at 8:49 for 8:30 appt  Communicated with referring healthcare provider:  Consult note available in pt's Epic chart per MD discretion  Follow Up: pt declined

## 2023-05-13 NOTE — PROGRESS NOTES
I have reviewed the notes, assessments, and/or procedures performed by Dr Huang, I concur with her/his documentation of Pinky Kate.    Yes

## 2023-05-19 ENCOUNTER — HOSPITAL ENCOUNTER (OUTPATIENT)
Dept: RADIOLOGY | Facility: OTHER | Age: 33
Discharge: HOME | End: 2023-05-19
Payer: MEDICAID

## 2023-05-19 DIAGNOSIS — Z30.431 IUD CHECK UP: ICD-10-CM

## 2023-05-19 PROCEDURE — 76856 US EXAM PELVIC COMPLETE: CPT | Mod: TC

## 2023-05-19 PROCEDURE — 76830 US PELVIS COMP WITH TRANSVAG NON-OB (XPD): ICD-10-PCS | Mod: 26,59,, | Performed by: RADIOLOGY

## 2023-05-19 PROCEDURE — 76856 US PELVIS COMP WITH TRANSVAG NON-OB (XPD): ICD-10-PCS | Mod: 26,,, | Performed by: RADIOLOGY

## 2023-05-19 PROCEDURE — 76830 TRANSVAGINAL US NON-OB: CPT | Mod: 26,59,, | Performed by: RADIOLOGY

## 2023-05-19 PROCEDURE — 76856 US EXAM PELVIC COMPLETE: CPT | Mod: 26,,, | Performed by: RADIOLOGY

## 2025-03-07 ENCOUNTER — LAB VISIT (OUTPATIENT)
Dept: LAB | Facility: OTHER | Age: 35
End: 2025-03-07
Attending: OBSTETRICS & GYNECOLOGY
Payer: COMMERCIAL

## 2025-03-07 ENCOUNTER — OFFICE VISIT (OUTPATIENT)
Dept: OBSTETRICS AND GYNECOLOGY | Facility: CLINIC | Age: 35
End: 2025-03-07
Payer: COMMERCIAL

## 2025-03-07 VITALS — WEIGHT: 293 LBS | SYSTOLIC BLOOD PRESSURE: 120 MMHG | DIASTOLIC BLOOD PRESSURE: 84 MMHG | BODY MASS INDEX: 49.23 KG/M2

## 2025-03-07 DIAGNOSIS — N93.9 ABNORMAL UTERINE BLEEDING (AUB): ICD-10-CM

## 2025-03-07 DIAGNOSIS — D25.9 UTERINE LEIOMYOMA, UNSPECIFIED LOCATION: ICD-10-CM

## 2025-03-07 DIAGNOSIS — N93.9 ABNORMAL UTERINE BLEEDING (AUB): Primary | ICD-10-CM

## 2025-03-07 DIAGNOSIS — Z32.02 NEGATIVE PREGNANCY TEST: ICD-10-CM

## 2025-03-07 LAB
B-HCG UR QL: NEGATIVE
BASOPHILS # BLD AUTO: 0.03 K/UL (ref 0–0.2)
BASOPHILS NFR BLD: 0.6 % (ref 0–1.9)
C TRACH DNA SPEC QL NAA+PROBE: NOT DETECTED
CTP QC/QA: YES
DIFFERENTIAL METHOD BLD: NORMAL
EOSINOPHIL # BLD AUTO: 0.2 K/UL (ref 0–0.5)
EOSINOPHIL NFR BLD: 3.9 % (ref 0–8)
ERYTHROCYTE [DISTWIDTH] IN BLOOD BY AUTOMATED COUNT: 13.2 % (ref 11.5–14.5)
ESTIMATED AVG GLUCOSE: 108 MG/DL (ref 68–131)
HBA1C MFR BLD: 5.4 % (ref 4–5.6)
HCT VFR BLD AUTO: 39.5 % (ref 37–48.5)
HGB BLD-MCNC: 13.3 G/DL (ref 12–16)
IMM GRANULOCYTES # BLD AUTO: 0 K/UL (ref 0–0.04)
IMM GRANULOCYTES NFR BLD AUTO: 0 % (ref 0–0.5)
LYMPHOCYTES # BLD AUTO: 2.5 K/UL (ref 1–4.8)
LYMPHOCYTES NFR BLD: 46.6 % (ref 18–48)
MCH RBC QN AUTO: 28.6 PG (ref 27–31)
MCHC RBC AUTO-ENTMCNC: 33.7 G/DL (ref 32–36)
MCV RBC AUTO: 85 FL (ref 82–98)
MONOCYTES # BLD AUTO: 0.3 K/UL (ref 0.3–1)
MONOCYTES NFR BLD: 6.3 % (ref 4–15)
N GONORRHOEA DNA SPEC QL NAA+PROBE: NOT DETECTED
NEUTROPHILS # BLD AUTO: 2.3 K/UL (ref 1.8–7.7)
NEUTROPHILS NFR BLD: 42.6 % (ref 38–73)
NRBC BLD-RTO: 0 /100 WBC
PLATELET # BLD AUTO: 276 K/UL (ref 150–450)
PMV BLD AUTO: 9.9 FL (ref 9.2–12.9)
RBC # BLD AUTO: 4.65 M/UL (ref 4–5.4)
T4 FREE SERPL-MCNC: 0.84 NG/DL (ref 0.71–1.51)
TSH SERPL DL<=0.005 MIU/L-ACNC: 1.36 UIU/ML (ref 0.4–4)
WBC # BLD AUTO: 5.36 K/UL (ref 3.9–12.7)

## 2025-03-07 PROCEDURE — 3008F BODY MASS INDEX DOCD: CPT | Mod: CPTII,S$GLB,, | Performed by: OBSTETRICS & GYNECOLOGY

## 2025-03-07 PROCEDURE — 36415 COLL VENOUS BLD VENIPUNCTURE: CPT | Performed by: OBSTETRICS & GYNECOLOGY

## 2025-03-07 PROCEDURE — 85025 COMPLETE CBC W/AUTO DIFF WBC: CPT | Performed by: OBSTETRICS & GYNECOLOGY

## 2025-03-07 PROCEDURE — 99213 OFFICE O/P EST LOW 20 MIN: CPT | Mod: S$GLB,,, | Performed by: OBSTETRICS & GYNECOLOGY

## 2025-03-07 PROCEDURE — 3079F DIAST BP 80-89 MM HG: CPT | Mod: CPTII,S$GLB,, | Performed by: OBSTETRICS & GYNECOLOGY

## 2025-03-07 PROCEDURE — 1159F MED LIST DOCD IN RCRD: CPT | Mod: CPTII,S$GLB,, | Performed by: OBSTETRICS & GYNECOLOGY

## 2025-03-07 PROCEDURE — 83036 HEMOGLOBIN GLYCOSYLATED A1C: CPT | Performed by: OBSTETRICS & GYNECOLOGY

## 2025-03-07 PROCEDURE — 87591 N.GONORRHOEAE DNA AMP PROB: CPT | Performed by: OBSTETRICS & GYNECOLOGY

## 2025-03-07 PROCEDURE — 99999 PR PBB SHADOW E&M-EST. PATIENT-LVL III: CPT | Mod: PBBFAC,,, | Performed by: OBSTETRICS & GYNECOLOGY

## 2025-03-07 PROCEDURE — 81025 URINE PREGNANCY TEST: CPT | Mod: S$GLB,,, | Performed by: OBSTETRICS & GYNECOLOGY

## 2025-03-07 PROCEDURE — 3074F SYST BP LT 130 MM HG: CPT | Mod: CPTII,S$GLB,, | Performed by: OBSTETRICS & GYNECOLOGY

## 2025-03-07 PROCEDURE — 3044F HG A1C LEVEL LT 7.0%: CPT | Mod: CPTII,S$GLB,, | Performed by: OBSTETRICS & GYNECOLOGY

## 2025-03-07 PROCEDURE — 1160F RVW MEDS BY RX/DR IN RCRD: CPT | Mod: CPTII,S$GLB,, | Performed by: OBSTETRICS & GYNECOLOGY

## 2025-03-07 PROCEDURE — 84439 ASSAY OF FREE THYROXINE: CPT | Performed by: OBSTETRICS & GYNECOLOGY

## 2025-03-07 PROCEDURE — 84443 ASSAY THYROID STIM HORMONE: CPT | Performed by: OBSTETRICS & GYNECOLOGY

## 2025-03-07 NOTE — PROGRESS NOTES
Chief Complaint   Patient presents with    Metrorrhagia       HPI:   34 y.o.  here today to discuss irregular menstrual bleeding, known h/o fibroids. Had Mirena IUD placed 2023. Initially had prolonged bleeding after placement. Bleeding stopped for a while, then started spotting periodically, sometimes would not have any cycles. Has been bleeding for the past 5 weeks. Bleeding is not as heavy and no large clots, saturating between 4-6 PPD on average. Feels some pelvic discomfort on the left side and occasional cramping. Has moments where she thinks bleeding stops but then it resumes during activity. Denies medical problems. Denies desire for future fertility. Has not been sexually active since November. UPT negative in clinic. Denies vaginal discharge, odor, irritation, or signs of infection.     TVUS (2023): Uterus 10.4 x 6.9 x 8.4 cm. Fibroids along anterior and left aspect of uterus, largest measuring 3.8 cm. IUD appropriately located in endometrial canal. BOV not visualized. No FF.  EMBx (2022): Proliferative endometrium, no polyps, negative for hyperplasia/carcinoma.       Labs / Significant Studies  Pap (): NILM/HPV neg    No visits with results within 3 Month(s) from this visit.   Latest known visit with results is:   Procedure visit on 2023   Component Date Value Ref Range Status    POC Preg Test, Ur 2023 Negative  Negative Final     Acceptable 2023 Yes   Final        Past Medical History:   Diagnosis Date    Anxiety     Pseudotumor cerebri      Past Surgical History:   Procedure Laterality Date     SECTION      x2    TONSILLECTOMY       Current Medications[1]  Review of patient's allergies indicates:   Allergen Reactions    Pcn [penicillins] Swelling     OB History    Para Term  AB Living   2 2    2   SAB IAB Ectopic Multiple Live Births             # Outcome Date GA Lbr Casey/2nd Weight Sex Type Anes PTL Lv   2 Para            1  Para              Social History[2]  Family History   Problem Relation Name Age of Onset    Cancer Father      Diabetes Neg Hx      Eclampsia Neg Hx      Hypertension Neg Hx      Miscarriages / Stillbirths Neg Hx       labor Neg Hx      Stroke Neg Hx         Review of Systems   Negative except as in HPI    Physical Exam   Vitals:    25 1029   BP: 120/84     Body mass index is 49.23 kg/m².    Physical Exam  Constitutional:       General: She is not in acute distress.     Appearance: Normal appearance.     Genitourinary:    Vulva, vagina, uterus, right adnexa, left adnexa and urethral meatus normal.   The external female genitalia was normal.   No external genitalia lesions identified,Genitalia hair distrobution normal .   No vaginal discharge or bleeding in the vagina.    No vaginal prolapse present.     No vaginal atrophy present.  Right adnexum displays no mass, no tenderness and no fullness. Left adnexum displays no mass, no tenderness and no fullness. Cervix is normal. Cervix exhibits no motion tenderness and no lesion. Uterus consistancy normal and Uerus contour normal  Uterus is not tender and no mass.    Uterus is anteverted.     HENT:      Head: Normocephalic and atraumatic.   Eyes:      Extraocular Movements: Extraocular movements intact.      Pupils: Pupils are equal, round, and reactive to light.   Pulmonary:      Effort: Pulmonary effort is normal.   Abdominal:      General: Abdomen is flat. There is no distension.      Palpations: Abdomen is soft.      Tenderness: There is no abdominal tenderness. There is no guarding or rebound.   Musculoskeletal:         General: Normal range of motion.      Cervical back: Normal range of motion.   Neurological:      General: No focal deficit present.      Mental Status: She is alert and oriented to person, place, and time.   Skin:     General: Skin is warm and dry.   Psychiatric:         Mood and Affect: Mood normal.         Behavior: Behavior  normal.         Thought Content: Thought content normal.   Vitals reviewed.      Labs reviewed: Pap, HPV, UPT, EMBx    ASSESSMENT:   Problem List[3]    PLAN:  Problem List Items Addressed This Visit          Renal/    Abnormal uterine bleeding (AUB) - Primary    Relevant Orders    US Pelvis Comp with Transvag NON-OB (xpd    C. trachomatis/N. gonorrhoeae by AMP DNA    CBC Auto Differential    TSH    T4, Free    Hemoglobin A1C    Uterine leiomyoma    Relevant Orders    US Pelvis Comp with Transvag NON-OB (xpd    C. trachomatis/N. gonorrhoeae by AMP DNA    CBC Auto Differential    TSH    T4, Free    Hemoglobin A1C        Total time spent on this encounter was ***.  This includes preparing to see the patient;  obtaining/reviewing separately obtained history;  performing a medical exam and/or evaluation;   counseling/educating the patient;  ordering medications, tests, of procedures;   referring/communicating with other health care professionals;  EMR documentation;  interpreting/communicating results to the patient;  and/or care coordination.    No follow-ups on file.       Brenda Staples MD  Department of Obstetrics & Gynecology  [facility]             [1]  No current outpatient medications on file.    Current Facility-Administered Medications:     levonorgestreL (MIRENA) 21 mcg/24 hours (8 yrs) 52 mg IUD 1 Intra Uterine Device, 1 Intra Uterine Device, Intrauterine, , Brenda Huang MD, 1 Intra Uterine Device at 05/11/23 1400  [2]  Social History  Tobacco Use    Smoking status: Former     Types: Cigarettes    Smokeless tobacco: Never   Substance Use Topics    Alcohol use: Yes     Comment: occas    Drug use: Yes     Types: Marijuana   [3]  Patient Active Problem List  Diagnosis    Right optic neuritis    Papilledema    Empty sella    Symptomatic anemia    Abnormal uterine bleeding (AUB)    Morbid obesity with BMI of 45.0-49.9, adult    IIH (idiopathic intracranial hypertension)    Uterine  leiomyoma

## 2025-03-11 ENCOUNTER — RESULTS FOLLOW-UP (OUTPATIENT)
Dept: OBSTETRICS AND GYNECOLOGY | Facility: CLINIC | Age: 35
End: 2025-03-11

## 2025-03-11 ENCOUNTER — TELEPHONE (OUTPATIENT)
Dept: OBSTETRICS AND GYNECOLOGY | Facility: CLINIC | Age: 35
End: 2025-03-11
Payer: COMMERCIAL

## 2025-03-11 NOTE — TELEPHONE ENCOUNTER
Spoke w pt. Rs virtual to 3/18 8:30am. Informed to check in via pt portal. Pt was satisfied w appt and verbalized understanding

## 2025-03-15 ENCOUNTER — HOSPITAL ENCOUNTER (OUTPATIENT)
Dept: RADIOLOGY | Facility: OTHER | Age: 35
Discharge: HOME | End: 2025-03-15
Attending: OBSTETRICS & GYNECOLOGY
Payer: COMMERCIAL

## 2025-03-15 DIAGNOSIS — N93.9 ABNORMAL UTERINE BLEEDING (AUB): ICD-10-CM

## 2025-03-15 DIAGNOSIS — D25.9 UTERINE LEIOMYOMA, UNSPECIFIED LOCATION: ICD-10-CM

## 2025-03-15 PROCEDURE — 76856 US EXAM PELVIC COMPLETE: CPT | Mod: 26,,, | Performed by: RADIOLOGY

## 2025-03-15 PROCEDURE — 76830 TRANSVAGINAL US NON-OB: CPT | Mod: 26,,, | Performed by: RADIOLOGY

## 2025-03-15 PROCEDURE — 76830 TRANSVAGINAL US NON-OB: CPT | Mod: TC

## 2025-03-16 NOTE — ASSESSMENT & PLAN NOTE
Continued bleeding with Mirena IUD. UPT negative today. Will obtain workup with labs and TVUS, then follo up virtual visit to discuss results and treatment options.

## 2025-03-18 ENCOUNTER — OFFICE VISIT (OUTPATIENT)
Dept: OBSTETRICS AND GYNECOLOGY | Facility: CLINIC | Age: 35
End: 2025-03-18
Payer: COMMERCIAL

## 2025-03-18 ENCOUNTER — PATIENT MESSAGE (OUTPATIENT)
Dept: OBSTETRICS AND GYNECOLOGY | Facility: CLINIC | Age: 35
End: 2025-03-18

## 2025-03-18 ENCOUNTER — TELEPHONE (OUTPATIENT)
Dept: OBSTETRICS AND GYNECOLOGY | Facility: CLINIC | Age: 35
End: 2025-03-18
Payer: COMMERCIAL

## 2025-03-18 DIAGNOSIS — D25.9 UTERINE LEIOMYOMA, UNSPECIFIED LOCATION: ICD-10-CM

## 2025-03-18 DIAGNOSIS — N93.9 ABNORMAL UTERINE BLEEDING (AUB): Primary | ICD-10-CM

## 2025-03-18 PROCEDURE — 3044F HG A1C LEVEL LT 7.0%: CPT | Mod: CPTII,95,, | Performed by: OBSTETRICS & GYNECOLOGY

## 2025-03-18 PROCEDURE — 98005 SYNCH AUDIO-VIDEO EST LOW 20: CPT | Mod: 95,,, | Performed by: OBSTETRICS & GYNECOLOGY

## 2025-03-18 PROCEDURE — 1159F MED LIST DOCD IN RCRD: CPT | Mod: CPTII,95,, | Performed by: OBSTETRICS & GYNECOLOGY

## 2025-03-18 PROCEDURE — 1160F RVW MEDS BY RX/DR IN RCRD: CPT | Mod: CPTII,95,, | Performed by: OBSTETRICS & GYNECOLOGY

## 2025-03-18 NOTE — TELEPHONE ENCOUNTER
Spoke w pt. Offered to get pt in sooner w ltrc but pt declined. Offered 4/4 BAP but pt declined. Scheduled removal/embx for 4/7 BAP. Pt satisfied w appt and verbalized understanding of both procedures on the same day. Informed her to call us if she experiences any issues and wants to get in sooner.    Informed since the IUD is misplaced, she cannot rely on the IUD as a contraceptive. Pt verbalized understanding.    Scheduled pre-op on 6/2 at 10am BAP and pre-admit on 6/16 BAP. Pt is unsure if she can commit to these dates now since it's far alex dvance. Informed her to call us once the dates get closer and we can always reschedule if there are any conflicts. Pt verbalized understanding and was satisfied w appts

## 2025-03-18 NOTE — ASSESSMENT & PLAN NOTE
Counseled patient on treatment options including medical and surgical options. Patient believes she would like a hysterectomy but needs to work on discussing this with her work. Will tentatively schedule for Kristel and contact patient to schedule preop/pre-admit appointments.     Also counseled that as the IUD is located in the endocervix it needs to be removed, and that it is NOT effective as a contraceptive. She is not sexually active currently, but the importance of using a backup method was stressed. Patient to contact the office to schedule IUD removal.

## 2025-03-18 NOTE — PROGRESS NOTES
The patient location is: [facility]  The chief complaint leading to consultation is: Discuss results  Visit type: audiovisual  Total time spent with patient: 21 minutes    Each patient to whom he or she provides medical services by telemedicine is:  (1) informed of the relationship between the physician and patient and the respective role of any other health care provider with respect to management of the patient; and (2) notified that he or she may decline to receive medical services by telemedicine and may withdraw from such care at any time.    Chief Complaint: Results     HPI:      Pinky Kate is a 34 y.o.  who presents via virtual visit to discuss AUB results. At her annual she noted continued irregular menstrual bleeding, has a known history of fibroids. Had Mirena IUD placed 2023. Initially had prolonged bleeding after placement. Bleeding stopped for a while, then started spotting periodically, then transitioned to not having a cycle. Now she has been bleeding for the past 5 weeks. Bleeding is not as heavy as it was prior to having IUD and no large clots, but saturating between 4-6 PPD on average. Feels some pelvic discomfort on the left side and occasional cramping. Has moments where she thinks bleeding stops but then it resumes during activity.     Patient does not have regular monthly menses. Patient's last menstrual period was 2025 (exact date).    ROS:     GENERAL: Denies fevers or chills. Feeling well overall.   ABDOMEN: Denies abdominal pain, constipation, diarrhea, nausea, vomiting, change in appetite.   URINARY: Denies frequency, dysuria, hematuria.  GYNECOLOGIC: See HPI.  NEUROLOGIC: Denies syncope or weakness.     Physical Exam:      PHYSICAL EXAM:  LMP 2025 (Exact Date)   There is no height or weight on file to calculate BMI.     APPEARANCE: Well nourished, well developed, in no acute distress.  Exam deferred due to televisit    Results:      Pap (2022): NILM/HPV  neg  EMB: (1/2022): Proliferative endometrium, no polyps, negative for hyperplasia/carcinoma  TVUS (3/2025): Uterus 11.1 x 8.2 x 7.9 cm. Left intramural fibroid measuring 5.1 cm, EMS obscured. IUD located in endocervical canal. BOV not visualized. No FF.     Assessment/Plan:     Problem List Items Addressed This Visit          Renal/    Abnormal uterine bleeding (AUB) - Primary    Current Assessment & Plan   Counseled patient on treatment options including medical and surgical options. Patient believes she would like a hysterectomy but needs to work on discussing this with her work. Will tentatively schedule for June and contact patient to schedule preop/pre-admit appointments.     Also counseled that as the IUD is located in the endocervix it needs to be removed, and that it is NOT effective as a contraceptive. She is not sexually active currently, but the importance of using a backup method was stressed. Patient to contact the office to schedule IUD removal.          Relevant Orders    Case Request Operating Room: HYSTERECTOMY,TOTAL,LAPAROSCOPIC,WITH SALPINGECTOMY (Completed)    Uterine leiomyoma    Relevant Orders    Case Request Operating Room: HYSTERECTOMY,TOTAL,LAPAROSCOPIC,WITH SALPINGECTOMY (Completed)     Counseling:       Use of the [employer] Patient Portal discussed and encouraged during today's visit.       Brenda Staples MD  Department of Obstetrics & Gynecology  [facility]

## 2025-03-18 NOTE — TELEPHONE ENCOUNTER
----- Message from Brenda Staples MD sent at 3/18/2025  1:06 PM CDT -----  Regarding: Preop  Hello! This patient wanted surgery 6/20, and the request is in. Can you please make a note/reminder to call her on Thursday 3/20 to schedule preop appointment the week of June 2nd and also offer to schedule her IUD removal some time in the next few weeks (it is malpositioned, please remind her that this means it is NOT effective as a contraceptive). Thanks!

## 2025-04-01 ENCOUNTER — PATIENT MESSAGE (OUTPATIENT)
Dept: OBSTETRICS AND GYNECOLOGY | Facility: CLINIC | Age: 35
End: 2025-04-01
Payer: COMMERCIAL

## 2025-04-01 ENCOUNTER — TELEPHONE (OUTPATIENT)
Dept: OBSTETRICS AND GYNECOLOGY | Facility: CLINIC | Age: 35
End: 2025-04-01
Payer: COMMERCIAL

## 2025-04-01 NOTE — TELEPHONE ENCOUNTER
----- Message from Corinna sent at 4/1/2025  9:51 AM CDT -----  Regarding: Rescheduling/Questions  Pt called in and stated she wanted to reschedule her appointment on the 7th, but had questions about the importance of the procedure/keeping the appointment. Could someone give this pt a call?MRN [id number] [phone]

## 2025-04-02 ENCOUNTER — PATIENT MESSAGE (OUTPATIENT)
Dept: OBSTETRICS AND GYNECOLOGY | Facility: CLINIC | Age: 35
End: 2025-04-02
Payer: COMMERCIAL

## 2025-04-02 NOTE — TELEPHONE ENCOUNTER
Called and spoke with patient. Answered questions about FMLA and appt on the 7th. Pt had no further  questions and verbalized understanding.

## 2025-04-07 ENCOUNTER — TELEPHONE (OUTPATIENT)
Dept: OBSTETRICS AND GYNECOLOGY | Facility: CLINIC | Age: 35
End: 2025-04-07
Payer: COMMERCIAL

## 2025-04-07 ENCOUNTER — PROCEDURE VISIT (OUTPATIENT)
Dept: OBSTETRICS AND GYNECOLOGY | Facility: CLINIC | Age: 35
End: 2025-04-07
Payer: COMMERCIAL

## 2025-04-07 VITALS — BODY MASS INDEX: 49.23 KG/M2 | WEIGHT: 293 LBS | DIASTOLIC BLOOD PRESSURE: 86 MMHG | SYSTOLIC BLOOD PRESSURE: 128 MMHG

## 2025-04-07 DIAGNOSIS — D25.9 UTERINE LEIOMYOMA, UNSPECIFIED LOCATION: ICD-10-CM

## 2025-04-07 DIAGNOSIS — Z30.432 ENCOUNTER FOR IUD REMOVAL: Primary | ICD-10-CM

## 2025-04-07 DIAGNOSIS — N93.9 ABNORMAL UTERINE BLEEDING (AUB): ICD-10-CM

## 2025-04-07 PROCEDURE — 99499 UNLISTED E&M SERVICE: CPT | Mod: S$GLB,,, | Performed by: OBSTETRICS & GYNECOLOGY

## 2025-04-07 NOTE — TELEPHONE ENCOUNTER
----- Message from Alejandra sent at 4/2/2025  1:34 PM CDT -----  Pinky Kate to [facility] Obstetrics And Gynecology Clinical Support Staff (supporting Freddy Berkowitz MA) (Selected Message)  4/2/25 11:41 AMGood Morning,  Thanks for reaching out to me. I will get this filled out and sent over as soon as possible. However, I do have one question, what is the length of leave?Freddy Berkowitz MA to Pinky Schultz  4/2/25  9:47 AMGood morning Ms. Kate, I have attached your FMLA to fill out. Please don't hesitate to reach out if you need some assistance sending off your documents. Thanks, Freddy

## 2025-05-26 ENCOUNTER — PROCEDURE VISIT (OUTPATIENT)
Dept: OBSTETRICS AND GYNECOLOGY | Facility: CLINIC | Age: 35
End: 2025-05-26
Payer: COMMERCIAL

## 2025-05-26 VITALS — SYSTOLIC BLOOD PRESSURE: 116 MMHG | BODY MASS INDEX: 48.76 KG/M2 | DIASTOLIC BLOOD PRESSURE: 84 MMHG | WEIGHT: 293 LBS

## 2025-05-26 DIAGNOSIS — D25.9 UTERINE LEIOMYOMA, UNSPECIFIED LOCATION: ICD-10-CM

## 2025-05-26 DIAGNOSIS — N93.9 ABNORMAL UTERINE BLEEDING (AUB): Primary | ICD-10-CM

## 2025-05-26 LAB
B-HCG UR QL: NEGATIVE
CTP QC/QA: YES

## 2025-05-26 PROCEDURE — 58100 BIOPSY OF UTERUS LINING: CPT | Mod: S$GLB,,, | Performed by: OBSTETRICS & GYNECOLOGY

## 2025-05-26 PROCEDURE — 88305 TISSUE EXAM BY PATHOLOGIST: CPT | Mod: TC | Performed by: OBSTETRICS & GYNECOLOGY

## 2025-05-26 PROCEDURE — 99214 OFFICE O/P EST MOD 30 MIN: CPT | Mod: 25,S$GLB,, | Performed by: OBSTETRICS & GYNECOLOGY

## 2025-05-26 PROCEDURE — 88305 TISSUE EXAM BY PATHOLOGIST: CPT | Mod: 26,,, | Performed by: PATHOLOGY

## 2025-05-26 PROCEDURE — 81025 URINE PREGNANCY TEST: CPT | Mod: S$GLB,,, | Performed by: OBSTETRICS & GYNECOLOGY

## 2025-05-28 LAB
ESTROGEN SERPL-MCNC: NORMAL PG/ML
INSULIN SERPL-ACNC: NORMAL U[IU]/ML
LAB AP CLINICAL INFORMATION: NORMAL
LAB AP DIAGNOSIS CATEGORY: NORMAL
LAB AP GROSS DESCRIPTION: NORMAL
LAB AP PERFORMING LOCATION(S): NORMAL
LAB AP REPORT FOOTNOTES: NORMAL

## 2025-05-31 ENCOUNTER — PATIENT MESSAGE (OUTPATIENT)
Dept: OBSTETRICS AND GYNECOLOGY | Facility: CLINIC | Age: 35
End: 2025-05-31
Payer: COMMERCIAL

## 2025-05-31 ENCOUNTER — RESULTS FOLLOW-UP (OUTPATIENT)
Dept: OBSTETRICS AND GYNECOLOGY | Facility: CLINIC | Age: 35
End: 2025-05-31
Payer: COMMERCIAL

## 2025-05-31 DIAGNOSIS — N93.9 ABNORMAL UTERINE BLEEDING (AUB): Primary | ICD-10-CM

## 2025-05-31 RX ORDER — MEDROXYPROGESTERONE ACETATE 10 MG/1
20 TABLET ORAL 3 TIMES DAILY
Qty: 42 TABLET | Refills: 0 | Status: SHIPPED | OUTPATIENT
Start: 2025-05-31 | End: 2025-06-07

## 2025-06-04 RX ORDER — CLINDAMYCIN PHOSPHATE 900 MG/50ML
900 INJECTION, SOLUTION INTRAVENOUS
OUTPATIENT
Start: 2025-06-04

## 2025-06-04 RX ORDER — FAMOTIDINE 20 MG/1
20 TABLET, FILM COATED ORAL
OUTPATIENT
Start: 2025-06-04

## 2025-06-09 ENCOUNTER — ANESTHESIA EVENT (OUTPATIENT)
Dept: SURGERY | Facility: OTHER | Age: 35
End: 2025-06-09
Payer: COMMERCIAL

## 2025-06-09 NOTE — ANESTHESIA PREPROCEDURE EVALUATION
06/09/2025  Pinky Kate is a 34 y.o., female.      Pre-op Assessment    I have reviewed the Patient Summary Reports.     I have reviewed the Nursing Notes. I have reviewed the NPO Status.   I have reviewed the Medications.     Review of Systems  Anesthesia Hx:             Denies Family Hx of Anesthesia complications.   Personal Hx of Anesthesia complications (itching after surgery with both c sections)                    Social:   Marijuana      Hematology/Oncology:    Oncology Normal    -- Anemia (has had blood transfusion in the past, last was around 2-3 years ago. most recent h/h wnl):                                  EENT/Dental:  EENT/Dental Normal           Cardiovascular:  Cardiovascular Normal                                              Pulmonary:  Pulmonary Normal                       Renal/:  Renal/ Normal                 Hepatic/GI:  Hepatic/GI Normal                    Musculoskeletal:  Musculoskeletal Normal                Neurological:  Neurology Normal          idiopathic intracranial hypertension/pseudotumor cerebri. Pt reports started after giving birth she had issues with vision. Vsion never fully recovered, has lumbar punctures in the past but reports she has not had to be on medication/diuretics in years                                Endocrine:  Endocrine Normal          Morbid Obesity / BMI > 40  Dermatological:  Skin Normal    Psych:  Psychiatric Normal                    Physical Exam  General: Well nourished, Cooperative, Alert and Oriented    Airway:  Mallampati: II   Mouth Opening: Normal  TM Distance: Normal  Tongue: Normal  Neck ROM: Normal ROM    Dental:  Intact  Front left upper tooth is chipped      Anesthesia Plan  Type of Anesthesia, risks & benefits discussed:    Anesthesia Type: Gen ETT  Intra-op Monitoring Plan: Standard ASA Monitors  Post Op Pain Control  Plan: multimodal analgesia  Induction:  IV  Airway Plan: Video, Post-Induction  ASA Score: 3  Day of Surgery Review of History & Physical: H&P Update referred to the surgeon/provider.  Anesthesia Plan Notes: CBC, BMP, T&S    Ready For Surgery From Anesthesia Perspective.     .

## 2025-06-18 ENCOUNTER — HOSPITAL ENCOUNTER (OUTPATIENT)
Dept: PREADMISSION TESTING | Facility: OTHER | Age: 35
Discharge: HOME | End: 2025-06-18
Attending: OBSTETRICS & GYNECOLOGY
Payer: COMMERCIAL

## 2025-06-18 VITALS
WEIGHT: 292 LBS | TEMPERATURE: 98 F | DIASTOLIC BLOOD PRESSURE: 81 MMHG | HEART RATE: 71 BPM | HEIGHT: 65 IN | SYSTOLIC BLOOD PRESSURE: 137 MMHG | RESPIRATION RATE: 16 BRPM | OXYGEN SATURATION: 97 % | BODY MASS INDEX: 48.65 KG/M2

## 2025-06-18 DIAGNOSIS — D25.9 UTERINE LEIOMYOMA, UNSPECIFIED LOCATION: ICD-10-CM

## 2025-06-18 DIAGNOSIS — Z01.818 PREOP TESTING: Primary | ICD-10-CM

## 2025-06-18 DIAGNOSIS — N93.9 ABNORMAL UTERINE BLEEDING (AUB): ICD-10-CM

## 2025-06-18 LAB
ABSOLUTE EOSINOPHIL (OHS): 0.25 K/UL
ABSOLUTE MONOCYTE (OHS): 0.24 K/UL (ref 0.3–1)
ABSOLUTE NEUTROPHIL COUNT (OHS): 2.62 K/UL (ref 1.8–7.7)
ANION GAP (OHS): 10 MMOL/L (ref 8–16)
BASOPHILS # BLD AUTO: 0.03 K/UL
BASOPHILS NFR BLD AUTO: 0.5 %
BUN SERPL-MCNC: 11 MG/DL (ref 6–20)
CALCIUM SERPL-MCNC: 9 MG/DL (ref 8.7–10.5)
CHLORIDE SERPL-SCNC: 111 MMOL/L (ref 95–110)
CO2 SERPL-SCNC: 22 MMOL/L (ref 23–29)
CREAT SERPL-MCNC: 1.2 MG/DL (ref 0.5–1.4)
ERYTHROCYTE [DISTWIDTH] IN BLOOD BY AUTOMATED COUNT: 13.7 % (ref 11.5–14.5)
GFR SERPLBLD CREATININE-BSD FMLA CKD-EPI: >60 ML/MIN/1.73/M2
GLUCOSE SERPL-MCNC: 104 MG/DL (ref 70–110)
HCT VFR BLD AUTO: 36.4 % (ref 37–48.5)
HGB BLD-MCNC: 12.5 GM/DL (ref 12–16)
IMM GRANULOCYTES # BLD AUTO: 0 K/UL (ref 0–0.04)
IMM GRANULOCYTES NFR BLD AUTO: 0 % (ref 0–0.5)
INDIRECT COOMBS: NORMAL
LYMPHOCYTES # BLD AUTO: 2.6 K/UL (ref 1–4.8)
MCH RBC QN AUTO: 28.8 PG (ref 27–31)
MCHC RBC AUTO-ENTMCNC: 34.3 G/DL (ref 32–36)
MCV RBC AUTO: 84 FL (ref 82–98)
NUCLEATED RBC (/100WBC) (OHS): 0 /100 WBC
PLATELET # BLD AUTO: 313 K/UL (ref 150–450)
PMV BLD AUTO: 8.9 FL (ref 9.2–12.9)
POTASSIUM SERPL-SCNC: 4.5 MMOL/L (ref 3.5–5.1)
RBC # BLD AUTO: 4.34 M/UL (ref 4–5.4)
RELATIVE EOSINOPHIL (OHS): 4.4 %
RELATIVE LYMPHOCYTE (OHS): 45.3 % (ref 18–48)
RELATIVE MONOCYTE (OHS): 4.2 % (ref 4–15)
RELATIVE NEUTROPHIL (OHS): 45.6 % (ref 38–73)
RH BLD: NORMAL
SODIUM SERPL-SCNC: 143 MMOL/L (ref 136–145)
SPECIMEN OUTDATE: NORMAL
WBC # BLD AUTO: 5.74 K/UL (ref 3.9–12.7)

## 2025-06-18 PROCEDURE — 80048 BASIC METABOLIC PNL TOTAL CA: CPT

## 2025-06-18 PROCEDURE — 85025 COMPLETE CBC W/AUTO DIFF WBC: CPT

## 2025-06-18 PROCEDURE — 86900 BLOOD TYPING SEROLOGIC ABO: CPT | Performed by: OBSTETRICS & GYNECOLOGY

## 2025-06-18 RX ORDER — ACETAMINOPHEN 500 MG
1000 TABLET ORAL
Status: CANCELLED | OUTPATIENT
Start: 2025-06-18 | End: 2025-06-18

## 2025-06-18 RX ORDER — SODIUM CHLORIDE, SODIUM LACTATE, POTASSIUM CHLORIDE, CALCIUM CHLORIDE 600; 310; 30; 20 MG/100ML; MG/100ML; MG/100ML; MG/100ML
INJECTION, SOLUTION INTRAVENOUS CONTINUOUS
Status: CANCELLED | OUTPATIENT
Start: 2025-06-18

## 2025-06-18 RX ORDER — LIDOCAINE HYDROCHLORIDE 10 MG/ML
0.5 INJECTION, SOLUTION EPIDURAL; INFILTRATION; INTRACAUDAL; PERINEURAL ONCE
Status: CANCELLED | OUTPATIENT
Start: 2025-06-18 | End: 2025-06-18

## 2025-06-18 NOTE — DISCHARGE INSTRUCTIONS
Information to Prepare you for your Surgery    PRE-ADMIT TESTING   [address]  [place] [address]  [address]     Your surgery has been scheduled at [facility]. We are pleased to have the opportunity to serve you. For Further Information please call [phone].    On the day of surgery please report to Registration on the 1st floor of the [facility].    CONTACT YOUR PHYSICIAN'S OFFICE THE DAY PRIOR TO YOUR SURGERY TO OBTAIN YOUR ARRIVAL TIME.     The evening before surgery do not eat anything after 9 p.m. ( this includes hard candy, chewing gum and mints).  You may only have GATORADE, POWERADE AND WATER  from 9 p.m. until you leave your home.   DO NOT DRINK ANY LIQUIDS ON THE WAY TO THE HOSPITAL.      Why does your anesthesiologist allow you to drink Gatorade/Powerade before surgery?  Gatorade/Powerade helps to increase your comfort before surgery and to decrease your nausea after surgery.   The carbohydrates in Gatorade/Powerade help reduce your body's stress response to surgery.  If you are a diabetic-drink only water prior to surgery.    Outpatient Surgery- May allow 2 adults (18 and older)/ Support Persons (1 being the designated ) for all surgical/procedural patients. A breastfeeding mother will be allowed her infant and 2 adult Support Persons. No one under the age of 18 will be allowed in the building.    MEDICATION INSTRUCTIONS: TAKE medications checked off by the Anesthesiologist on your Medication List.    Surgery Patients:  If you take ASPIRIN - Your PHYSICIAN/SURGEON will need to inform you IF/OR when you need to stop taking aspirin prior to your surgery.     Starting the week prior to surgery, do not take any medications containing IBUPROFEN or NSAIDS (Advil, Aleve, BC, Celebrex, Goody's, Ketorolac, Meloxicam, Mobic, Motrin, Naproxen, Toradol, etc).  If you are not sure if you should take a medicine please call your surgeon's office.  You may take Tylenol.    Do  Not Wear any make-up (especially eye make-up) to surgery. Please remove any false eyelashes or eyelash extensions. If you arrive the day of surgery with makeup/eyelashes on you will be required to remove prior to surgery. (There is a risk of corneal abrasions if eye makeup/eyelash extensions are not removed)    Leave all valuables at home.   Do Not wear any jewelry or watches, including any metal in body piercings. Jewelry must be removed prior to coming to the hospital.  There is a possibility that rings that are unable to be removed may be cut off if they are on the surgical extremity.    Please remove all hair extensions, wigs, clips and any other metal accessories/ ornaments from your hair.  These items may pose a flammable/fire risk in Surgery and must be removed.    Do not shave your surgical area at least 5 days prior to your surgery. The surgical prep will be performed at the hospital according to Infection Control regulations.    Contact Lens must be removed before surgery. Either do not wear the contact lens or bring a case and solution for storage.  Please bring a container for eyeglasses or dentures as required.  Bring any paperwork your physician has provided, such as consent forms,  history and physicals, doctor's orders, etc.   Bring comfortable clothes that are loose fitting to wear upon discharge. Take into consideration the type of surgery being performed.  Maintain your diet as advised per your physician the day prior to surgery.    Adequate rest the night before surgery is advised.   Park in the Parking lot behind the hospital or in the [facility] [facility] across the street from the parking lot. Parking is complimentary.  If you will be discharged the same day as your procedure, please arrange for a responsible adult to drive you home or to accompany you if traveling by taxi.   YOU WILL NOT BE PERMITTED TO DRIVE OR TO LEAVE THE HOSPITAL ALONE AFTER SURGERY.   If you are being discharged the  same day, it is strongly recommended that you arrange for someone to remain with you for the first 24 hrs following your surgery.    The Surgeon will speak to your family/visitor after your surgery regarding the outcome of your surgery and post op care.  The Surgeon may speak to you after your surgery, but there is a possibility you may not remember the details.  Please check with your family members regarding the conversation with the Surgeon.    We strongly recommend whoever is bringing you home be present for discharge instructions.  This will ensure a thorough understanding for your post op home care.              Bathing Instructions with Hibiclens  Shower the evening before and morning of your procedure with Chlorhexidine (Hibiclens)    Do not use Chlorhexidine on your face or genitals. Do not get in your eyes.  Wash your face with water and your regular face wash/soap  Use your regular shampoo  Apply Chlorhexidine (Hibiclens) directly on your skin or on a wet washcloth and wash gently. When showering: Move away from the shower stream when applying Chlorhexidine (Hibiclens) to avoid rinsing off too soon.  Rinse thoroughly with warm water  Do not dilute Chlorhexidine (Hibiclens)   Dry off as usual, do not use any deodorant, powder, body lotions, perfume, after shave or cologne.     If the patient has fever, cough, or signs/symptoms of Flu or Covid please do not come in for your surgery.   Contact your surgeon and your primary care physician for further instructions.   Please also call [facility] Outpatient Surgery [phone]. The unit opens at 5 AM.    If applicable, please bring your blood pressure & diabetes medications the day of surgery.

## 2025-06-19 ENCOUNTER — TELEPHONE (OUTPATIENT)
Dept: OBSTETRICS AND GYNECOLOGY | Facility: CLINIC | Age: 35
End: 2025-06-19
Payer: COMMERCIAL

## 2025-06-19 NOTE — H&P
[facility]  [facility]     SUBJECTIVE:      Chief Complaint/Reason for Procedure: AUB-L     History of Present Illness:  Patient is a 34 y.o.  with AUB-L, failed medical management with IUD, now desiring definitive surgical management with hysterectomy. Had Mirena IUD placed 2023 for management of AUB. Initially had prolonged bleeding after placement. Bleeding is not as heavy as it was prior to IUD placement and no large clots, but saturating between 4-6 PPD on average and has bled up to 5 weeks at a time. Recent US showed IUD in endocervical canal. Feels some pelvic discomfort on the left side and occasional cramping. Has moments where she thinks bleeding stops but then it resumes during activity.      She does not desire future fertility.     PMH includes idiopathic intracranial HTN, anxiety, obesity, and anemia.     PSH: C/S x2, tonsillectomy     TVUS (3/2025): Uterus 11.1 x 8.2 x 7.9 cm. Intramural leiomyoma on left side measures 5.1 cm. EMS obscured. IUD in endocervical canal. BOV not visualized.   Pap (2022): NILM/HPV neg   EMBx (2025): Small fragments of inactive endometrium showing changes compatible with progestin effect, no atypical hyperplasia or malignancy identified.           Review of patient's allergies indicates:   Allergen Reactions    Pcn [penicillins] Swelling                           OB History    Para Term  AB Living    2 2 2     2    SAB IAB Ectopic Multiple Live Births               2           # Outcome Date GA Lbr Casey/2nd Weight Sex Type Anes PTL Lv   2 Term           CS-LTranv         1 Term           CS-LTranv                 Past Medical History:   Diagnosis Date    Anxiety      Pseudotumor cerebri             Past Surgical History:   Procedure Laterality Date     SECTION         x2    TONSILLECTOMY                 Family History   Problem Relation Name Age of Onset    Cancer Father        Diabetes Neg Hx        Eclampsia Neg  Hx        Hypertension Neg Hx        Miscarriages / Stillbirths Neg Hx         labor Neg Hx        Stroke Neg Hx          [Social History]    [Social History]        Tobacco Use    Smoking status: Former       Types: Cigarettes    Smokeless tobacco: Never   Substance Use Topics    Alcohol use: Yes       Comment: occas    Drug use: Yes       Types: Marijuana     No outpatient medications have been marked as taking for the 25 encounter (Procedure visit) with Brenda Staples MD.         Review of Systems:  Constitutional: no fever or chills  Respiratory: no cough or shortness of breath  Cardiovascular: no chest pain or palpitations  Gastrointestinal: no nausea or vomiting, tolerating diet  Genitourinary: no hematuria or dysuria  Musculoskeletal: no arthralgias or myalgias  Neurological: no seizures or tremors  Behavioral/Psych: no auditory or visual hallucinations     OBJECTIVE:      Vital Signs (Most Recent):  BP: 116/84 (25 1427)     Physical Exam:  General: well developed, well nourished  Neck: thyroid not enlarged, symmetric, no tenderness/mass/nodules  Lungs:  clear to auscultation bilaterally and normal respiratory effort  Cardiovascular: Heart: regular rate and rhythm, S1, S2 normal, no murmur, click, rub or gallop. Chest Wall: no tenderness. Extremities: no cyanosis or edema, or clubbing. Pulses: 2+ and symmetric  not examined.  Abdomen: soft, non-tender non-distented; bowel sounds normal; no masses,  no organomegaly. Well-healed Pfannenstiel incision  Rectal: not examined  Pelvic:  external genitalia normal, urethra without abnormality or discharge, vagina normal without discharge, cervix normal in appearance, no cervical motion tenderness, uterus 10-12 week size, iregular shape, firm consistency, limited mobility, would not be good TVH candidate, no adnexal masses or tenderness    Skin: Skin color, texture, turgor normal. No rashes or lesions  Musculoskeletal:no clubbing,  cyanosis  Neurologic: Normal strength and tone. No focal numbness or weakness  Psych/Behavioral:  Alert and oriented, appropriate affect.     Laboratory:        Lab Results   Component Value Date     WBC 5.36 03/07/2025     HGB 13.3 03/07/2025     HCT 39.5 03/07/2025     MCV 85 03/07/2025      03/07/2025      CMP        Sodium   Date Value Ref Range Status   03/21/2023 140 136 - 145 mmol/L Final            Potassium   Date Value Ref Range Status   03/21/2023 4.0 3.5 - 5.1 mmol/L Final            Chloride   Date Value Ref Range Status   03/21/2023 109 95 - 110 mmol/L Final            CO2   Date Value Ref Range Status   03/21/2023 23 23 - 29 mmol/L Final            Glucose   Date Value Ref Range Status   03/21/2023 90 70 - 110 mg/dL Final            BUN   Date Value Ref Range Status   03/21/2023 12 6 - 20 mg/dL Final            Creatinine   Date Value Ref Range Status   03/21/2023 1.0 0.5 - 1.4 mg/dL Final            Calcium   Date Value Ref Range Status   03/21/2023 8.7 8.7 - 10.5 mg/dL Final            Total Protein   Date Value Ref Range Status   08/27/2013 7.5 6.0 - 8.4 g/dL Final            Albumin   Date Value Ref Range Status   08/27/2013 3.5 3.5 - 5.2 g/dl Final              Total Bilirubin   Date Value Ref Range Status   08/27/2013 0.5 0.1 - 1.0 mg/dl Final       Comment:       For infants and newborns, interpretation of results should be based  on gestational age, weight and in agreement with clinical  observations.  .  Premature Infant recommended reference ranges:  Up to 24 hours.............<8.0 mg/dl  Up to 48 hours............<12.0 mg/dl  3-5 days..................<15.0 mg/dl  6-29 days.................<15.0 mg/dl            Alkaline Phosphatase   Date Value Ref Range Status   08/27/2013 86 55 - 135 U/L Final            AST   Date Value Ref Range Status   08/27/2013 34 10 - 40 U/L Final            ALT   Date Value Ref Range Status   08/27/2013 43 10 - 44 U/L Final            Anion Gap   Date  Value Ref Range Status   2023 8 8 - 16 mmol/L Final              eGFR if    Date Value Ref Range Status   2013 >60 >60 mL/min Final       Comment:       Estimated glomerular filtration rate (eGFR) is normalized to an  average body surface area of 1.73 square meters.  The calculation  used to obtain the eGFR is the adjusted MDRD equation, which factors  patient sex, age, race, and creatinine result.  Since race is unknown  in our information system, the eGFR values for -American  and Non--American patients are given for each creatinine  result.            eGFR if non    Date Value Ref Range Status   2013 >60 >60 mL/min Final         ASSESSMENT/PLAN:   34 y.o.  with AUB-L, failed medical management with IUD, desires definitive surgical management with hysterectomy.      Patient counseled on risks of surgery including but not limited to pain, bleeding, infection, damage to surrounding pelvic or abdominal structures such as bowel or bladder, damage to pelvic/abdominal nerves and vasculature, anesthesia complications, and death. Counseled on planned laparoscopic nature of procedure with possible conversion to open procedure in the event of hemorrhage or dense adhesive disease. Patient counseled on intended ovarian-sparing procedure, but that ovaries may have to be removed if grossly abnormal or incidentally damaged during surgery. She was counseled on the effects and implications of surgical menopause including recommendations for HRT. Patient consents to a blood transfusion if one becomes medically necessary. Consents signed and all questions answered to the patient's apparent satisfaction. To OR 25 for TLH/BS, and other necessary interventions.      T&S, UPT to be ordered at preadmit.     RTC 2 and 6 weeks for postop visit.        Brenda Staples MD  Department of Obstetrics & Gynecology  [facility]

## 2025-06-20 ENCOUNTER — HOSPITAL ENCOUNTER (OUTPATIENT)
Facility: OTHER | Age: 35
Discharge: HOME | End: 2025-06-21
Attending: OBSTETRICS & GYNECOLOGY | Admitting: OBSTETRICS & GYNECOLOGY
Payer: COMMERCIAL

## 2025-06-20 ENCOUNTER — ANESTHESIA (OUTPATIENT)
Dept: SURGERY | Facility: OTHER | Age: 35
End: 2025-06-20
Payer: COMMERCIAL

## 2025-06-20 DIAGNOSIS — D25.9 UTERINE LEIOMYOMA, UNSPECIFIED LOCATION: ICD-10-CM

## 2025-06-20 DIAGNOSIS — Z90.710 S/P LAPAROSCOPIC HYSTERECTOMY: Primary | ICD-10-CM

## 2025-06-20 DIAGNOSIS — N93.9 ABNORMAL UTERINE BLEEDING (AUB): ICD-10-CM

## 2025-06-20 LAB
B-HCG UR QL: NEGATIVE
CTP QC/QA: YES
GLUCOSE SERPL-MCNC: 89 MG/DL (ref 70–110)
POCT GLUCOSE: 89 MG/DL (ref 70–110)

## 2025-06-20 PROCEDURE — 88302 TISSUE EXAM BY PATHOLOGIST: CPT | Mod: TC,59 | Performed by: OBSTETRICS & GYNECOLOGY

## 2025-06-20 PROCEDURE — 63600175 PHARM REV CODE 636 W HCPCS: Performed by: OBSTETRICS & GYNECOLOGY

## 2025-06-20 PROCEDURE — 25000003 PHARM REV CODE 250: Performed by: ANESTHESIOLOGY

## 2025-06-20 PROCEDURE — P9045 ALBUMIN (HUMAN), 5%, 250 ML: HCPCS | Mod: JZ,TB | Performed by: NURSE ANESTHETIST, CERTIFIED REGISTERED

## 2025-06-20 PROCEDURE — 71000039 HC RECOVERY, EACH ADD'L HOUR: Performed by: OBSTETRICS & GYNECOLOGY

## 2025-06-20 PROCEDURE — 63600175 PHARM REV CODE 636 W HCPCS: Performed by: ANESTHESIOLOGY

## 2025-06-20 PROCEDURE — 37000008 HC ANESTHESIA 1ST 15 MINUTES: Performed by: OBSTETRICS & GYNECOLOGY

## 2025-06-20 PROCEDURE — 71000016 HC POSTOP RECOV ADDL HR: Performed by: OBSTETRICS & GYNECOLOGY

## 2025-06-20 PROCEDURE — 71000033 HC RECOVERY, INTIAL HOUR: Performed by: OBSTETRICS & GYNECOLOGY

## 2025-06-20 PROCEDURE — 81025 URINE PREGNANCY TEST: CPT | Performed by: OBSTETRICS & GYNECOLOGY

## 2025-06-20 PROCEDURE — 36000711: Performed by: OBSTETRICS & GYNECOLOGY

## 2025-06-20 PROCEDURE — 25000003 PHARM REV CODE 250: Performed by: STUDENT IN AN ORGANIZED HEALTH CARE EDUCATION/TRAINING PROGRAM

## 2025-06-20 PROCEDURE — 71000015 HC POSTOP RECOV 1ST HR: Performed by: OBSTETRICS & GYNECOLOGY

## 2025-06-20 PROCEDURE — 36000710: Performed by: OBSTETRICS & GYNECOLOGY

## 2025-06-20 PROCEDURE — 25000003 PHARM REV CODE 250: Performed by: OBSTETRICS & GYNECOLOGY

## 2025-06-20 PROCEDURE — 27201423 OPTIME MED/SURG SUP & DEVICES STERILE SUPPLY: Performed by: OBSTETRICS & GYNECOLOGY

## 2025-06-20 PROCEDURE — 37000009 HC ANESTHESIA EA ADD 15 MINS: Performed by: OBSTETRICS & GYNECOLOGY

## 2025-06-20 PROCEDURE — 63600175 PHARM REV CODE 636 W HCPCS: Performed by: NURSE ANESTHETIST, CERTIFIED REGISTERED

## 2025-06-20 PROCEDURE — 82962 GLUCOSE BLOOD TEST: CPT | Performed by: OBSTETRICS & GYNECOLOGY

## 2025-06-20 PROCEDURE — 58573 TLH W/T/O UTERUS OVER 250 G: CPT | Mod: ,,, | Performed by: OBSTETRICS & GYNECOLOGY

## 2025-06-20 PROCEDURE — 63600175 PHARM REV CODE 636 W HCPCS

## 2025-06-20 PROCEDURE — 25000003 PHARM REV CODE 250

## 2025-06-20 PROCEDURE — 25000003 PHARM REV CODE 250: Performed by: NURSE ANESTHETIST, CERTIFIED REGISTERED

## 2025-06-20 RX ORDER — LIDOCAINE HYDROCHLORIDE 10 MG/ML
0.5 INJECTION, SOLUTION EPIDURAL; INFILTRATION; INTRACAUDAL; PERINEURAL ONCE
Status: DISCONTINUED | OUTPATIENT
Start: 2025-06-20 | End: 2025-06-20

## 2025-06-20 RX ORDER — ONDANSETRON HYDROCHLORIDE 2 MG/ML
4 INJECTION, SOLUTION INTRAVENOUS EVERY 6 HOURS PRN
Status: DISCONTINUED | OUTPATIENT
Start: 2025-06-21 | End: 2025-06-21 | Stop reason: HOSPADM

## 2025-06-20 RX ORDER — MUPIROCIN 20 MG/G
OINTMENT TOPICAL 2 TIMES DAILY
Status: DISCONTINUED | OUTPATIENT
Start: 2025-06-21 | End: 2025-06-21 | Stop reason: HOSPADM

## 2025-06-20 RX ORDER — ALBUMIN HUMAN 50 G/1000ML
SOLUTION INTRAVENOUS
Status: DISCONTINUED | OUTPATIENT
Start: 2025-06-20 | End: 2025-06-20

## 2025-06-20 RX ORDER — MIDAZOLAM HYDROCHLORIDE 1 MG/ML
INJECTION INTRAMUSCULAR; INTRAVENOUS
Status: DISCONTINUED | OUTPATIENT
Start: 2025-06-20 | End: 2025-06-20

## 2025-06-20 RX ORDER — DOCUSATE SODIUM 100 MG/1
100 CAPSULE, LIQUID FILLED ORAL DAILY
Status: DISCONTINUED | OUTPATIENT
Start: 2025-06-21 | End: 2025-06-21 | Stop reason: HOSPADM

## 2025-06-20 RX ORDER — NALOXONE HCL 0.4 MG/ML
0.02 VIAL (ML) INJECTION
Status: DISCONTINUED | OUTPATIENT
Start: 2025-06-21 | End: 2025-06-21 | Stop reason: HOSPADM

## 2025-06-20 RX ORDER — DEXTROMETHORPHAN HYDROBROMIDE, GUAIFENESIN 5; 100 MG/5ML; MG/5ML
650 LIQUID ORAL EVERY 6 HOURS PRN
Qty: 30 TABLET | Refills: 0 | Status: SHIPPED | OUTPATIENT
Start: 2025-06-20 | End: 2025-06-21

## 2025-06-20 RX ORDER — HYDROMORPHONE HYDROCHLORIDE 2 MG/ML
0.4 INJECTION, SOLUTION INTRAMUSCULAR; INTRAVENOUS; SUBCUTANEOUS
Status: DISCONTINUED | OUTPATIENT
Start: 2025-06-21 | End: 2025-06-21 | Stop reason: HOSPADM

## 2025-06-20 RX ORDER — ONDANSETRON 8 MG/1
8 TABLET, ORALLY DISINTEGRATING ORAL ONCE
Status: COMPLETED | OUTPATIENT
Start: 2025-06-20 | End: 2025-06-20

## 2025-06-20 RX ORDER — PROCHLORPERAZINE EDISYLATE 5 MG/ML
5 INJECTION INTRAMUSCULAR; INTRAVENOUS EVERY 30 MIN PRN
Status: DISCONTINUED | OUTPATIENT
Start: 2025-06-20 | End: 2025-06-20

## 2025-06-20 RX ORDER — DOCUSATE SODIUM 100 MG/1
100 CAPSULE, LIQUID FILLED ORAL 2 TIMES DAILY
Qty: 120 CAPSULE | Refills: 0 | Status: SHIPPED | OUTPATIENT
Start: 2025-06-20 | End: 2025-06-21

## 2025-06-20 RX ORDER — OXYCODONE HYDROCHLORIDE 5 MG/1
5 TABLET ORAL EVERY 6 HOURS PRN
Qty: 15 TABLET | Refills: 0 | Status: SHIPPED | OUTPATIENT
Start: 2025-06-20 | End: 2025-06-21

## 2025-06-20 RX ORDER — IBUPROFEN 600 MG/1
600 TABLET, FILM COATED ORAL EVERY 6 HOURS
Status: DISCONTINUED | OUTPATIENT
Start: 2025-06-22 | End: 2025-06-21 | Stop reason: HOSPADM

## 2025-06-20 RX ORDER — ONDANSETRON HYDROCHLORIDE 2 MG/ML
INJECTION, SOLUTION INTRAVENOUS
Status: DISCONTINUED | OUTPATIENT
Start: 2025-06-20 | End: 2025-06-20

## 2025-06-20 RX ORDER — SODIUM CHLORIDE 0.9 % (FLUSH) 0.9 %
3 SYRINGE (ML) INJECTION
Status: DISCONTINUED | OUTPATIENT
Start: 2025-06-20 | End: 2025-06-20

## 2025-06-20 RX ORDER — KETAMINE HCL IN 0.9 % NACL 50 MG/5 ML
SYRINGE (ML) INTRAVENOUS
Status: DISCONTINUED | OUTPATIENT
Start: 2025-06-20 | End: 2025-06-20

## 2025-06-20 RX ORDER — OXYCODONE HYDROCHLORIDE 5 MG/1
5 TABLET ORAL
Status: DISCONTINUED | OUTPATIENT
Start: 2025-06-20 | End: 2025-06-20

## 2025-06-20 RX ORDER — NITROFURANTOIN 25; 75 MG/1; MG/1
100 CAPSULE ORAL DAILY
Qty: 10 CAPSULE | Refills: 0 | Status: SHIPPED | OUTPATIENT
Start: 2025-06-20

## 2025-06-20 RX ORDER — BUPIVACAINE HYDROCHLORIDE 2.5 MG/ML
INJECTION, SOLUTION EPIDURAL; INFILTRATION; INTRACAUDAL; PERINEURAL
Status: DISCONTINUED | OUTPATIENT
Start: 2025-06-20 | End: 2025-06-20 | Stop reason: HOSPADM

## 2025-06-20 RX ORDER — FENTANYL CITRATE 50 UG/ML
INJECTION, SOLUTION INTRAMUSCULAR; INTRAVENOUS
Status: DISCONTINUED | OUTPATIENT
Start: 2025-06-20 | End: 2025-06-20

## 2025-06-20 RX ORDER — ACETAMINOPHEN 500 MG
1000 TABLET ORAL
Status: COMPLETED | OUTPATIENT
Start: 2025-06-20 | End: 2025-06-20

## 2025-06-20 RX ORDER — HYDROMORPHONE HYDROCHLORIDE 2 MG/ML
0.4 INJECTION, SOLUTION INTRAMUSCULAR; INTRAVENOUS; SUBCUTANEOUS EVERY 5 MIN PRN
Status: DISCONTINUED | OUTPATIENT
Start: 2025-06-20 | End: 2025-06-20

## 2025-06-20 RX ORDER — SODIUM CHLORIDE, SODIUM LACTATE, POTASSIUM CHLORIDE, CALCIUM CHLORIDE 600; 310; 30; 20 MG/100ML; MG/100ML; MG/100ML; MG/100ML
INJECTION, SOLUTION INTRAVENOUS CONTINUOUS
Status: DISCONTINUED | OUTPATIENT
Start: 2025-06-20 | End: 2025-06-20

## 2025-06-20 RX ORDER — PROCHLORPERAZINE EDISYLATE 5 MG/ML
5 INJECTION INTRAMUSCULAR; INTRAVENOUS EVERY 6 HOURS PRN
Status: DISCONTINUED | OUTPATIENT
Start: 2025-06-21 | End: 2025-06-21 | Stop reason: HOSPADM

## 2025-06-20 RX ORDER — KETOROLAC TROMETHAMINE 30 MG/ML
15 INJECTION, SOLUTION INTRAMUSCULAR; INTRAVENOUS EVERY 6 HOURS
Status: DISCONTINUED | OUTPATIENT
Start: 2025-06-21 | End: 2025-06-21 | Stop reason: HOSPADM

## 2025-06-20 RX ORDER — OXYCODONE HYDROCHLORIDE 5 MG/1
5 TABLET ORAL EVERY 4 HOURS PRN
Status: DISCONTINUED | OUTPATIENT
Start: 2025-06-21 | End: 2025-06-21 | Stop reason: HOSPADM

## 2025-06-20 RX ORDER — DEXAMETHASONE SODIUM PHOSPHATE 4 MG/ML
INJECTION, SOLUTION INTRA-ARTICULAR; INTRALESIONAL; INTRAMUSCULAR; INTRAVENOUS; SOFT TISSUE
Status: DISCONTINUED | OUTPATIENT
Start: 2025-06-20 | End: 2025-06-20

## 2025-06-20 RX ORDER — SUCCINYLCHOLINE CHLORIDE 20 MG/ML
INJECTION INTRAMUSCULAR; INTRAVENOUS
Status: DISCONTINUED | OUTPATIENT
Start: 2025-06-20 | End: 2025-06-20

## 2025-06-20 RX ORDER — ZOLPIDEM TARTRATE 5 MG/1
5 TABLET ORAL NIGHTLY PRN
Status: DISCONTINUED | OUTPATIENT
Start: 2025-06-21 | End: 2025-06-21 | Stop reason: HOSPADM

## 2025-06-20 RX ORDER — FAMOTIDINE 20 MG/1
20 TABLET, FILM COATED ORAL
Status: COMPLETED | OUTPATIENT
Start: 2025-06-20 | End: 2025-06-20

## 2025-06-20 RX ORDER — PHENAZOPYRIDINE HYDROCHLORIDE 100 MG/1
100 TABLET, FILM COATED ORAL 3 TIMES DAILY PRN
Qty: 30 TABLET | Refills: 0 | Status: SHIPPED | OUTPATIENT
Start: 2025-06-20 | End: 2025-06-30

## 2025-06-20 RX ORDER — IBUPROFEN 600 MG/1
600 TABLET, FILM COATED ORAL EVERY 6 HOURS PRN
Qty: 30 TABLET | Refills: 0 | Status: SHIPPED | OUTPATIENT
Start: 2025-06-20

## 2025-06-20 RX ORDER — CLINDAMYCIN PHOSPHATE 900 MG/50ML
900 INJECTION, SOLUTION INTRAVENOUS
Status: COMPLETED | OUTPATIENT
Start: 2025-06-20 | End: 2025-06-20

## 2025-06-20 RX ORDER — ACETAMINOPHEN 500 MG
1000 TABLET ORAL EVERY 6 HOURS
Status: DISCONTINUED | OUTPATIENT
Start: 2025-06-21 | End: 2025-06-21 | Stop reason: HOSPADM

## 2025-06-20 RX ORDER — SODIUM CHLORIDE, SODIUM LACTATE, POTASSIUM CHLORIDE, CALCIUM CHLORIDE 600; 310; 30; 20 MG/100ML; MG/100ML; MG/100ML; MG/100ML
INJECTION, SOLUTION INTRAVENOUS CONTINUOUS
Status: DISCONTINUED | OUTPATIENT
Start: 2025-06-21 | End: 2025-06-21 | Stop reason: HOSPADM

## 2025-06-20 RX ORDER — GLUCAGON 1 MG
1 KIT INJECTION
Status: DISCONTINUED | OUTPATIENT
Start: 2025-06-20 | End: 2025-06-20

## 2025-06-20 RX ORDER — OXYCODONE HYDROCHLORIDE 10 MG/1
10 TABLET ORAL EVERY 4 HOURS PRN
Status: DISCONTINUED | OUTPATIENT
Start: 2025-06-21 | End: 2025-06-21 | Stop reason: HOSPADM

## 2025-06-20 RX ORDER — PROPOFOL 10 MG/ML
VIAL (ML) INTRAVENOUS
Status: DISCONTINUED | OUTPATIENT
Start: 2025-06-20 | End: 2025-06-20

## 2025-06-20 RX ORDER — LIDOCAINE HYDROCHLORIDE 20 MG/ML
INJECTION INTRAVENOUS
Status: DISCONTINUED | OUTPATIENT
Start: 2025-06-20 | End: 2025-06-20

## 2025-06-20 RX ORDER — ROCURONIUM BROMIDE 10 MG/ML
INJECTION, SOLUTION INTRAVENOUS
Status: DISCONTINUED | OUTPATIENT
Start: 2025-06-20 | End: 2025-06-20

## 2025-06-20 RX ADMIN — OXYCODONE 5 MG: 5 TABLET ORAL at 07:06

## 2025-06-20 RX ADMIN — FENTANYL CITRATE 100 MCG: 50 INJECTION, SOLUTION INTRAMUSCULAR; INTRAVENOUS at 12:06

## 2025-06-20 RX ADMIN — ACETAMINOPHEN 1000 MG: 500 TABLET, FILM COATED ORAL at 09:06

## 2025-06-20 RX ADMIN — ALBUMIN (HUMAN) 200 ML: 12.5 SOLUTION INTRAVENOUS at 02:06

## 2025-06-20 RX ADMIN — LIDOCAINE HYDROCHLORIDE 50 MG: 20 INJECTION, SOLUTION INTRAVENOUS at 12:06

## 2025-06-20 RX ADMIN — FAMOTIDINE 20 MG: 20 TABLET, FILM COATED ORAL at 09:06

## 2025-06-20 RX ADMIN — ROCURONIUM BROMIDE 50 MG: 10 SOLUTION INTRAVENOUS at 12:06

## 2025-06-20 RX ADMIN — GENTAMICIN SULFATE 436 MG: 40 INJECTION, SOLUTION INTRAMUSCULAR; INTRAVENOUS at 12:06

## 2025-06-20 RX ADMIN — GLYCOPYRROLATE 0.2 MG: 0.2 INJECTION, SOLUTION INTRAMUSCULAR; INTRAVITREAL at 12:06

## 2025-06-20 RX ADMIN — HYDROMORPHONE HYDROCHLORIDE 0.4 MG: 2 INJECTION INTRAMUSCULAR; INTRAVENOUS; SUBCUTANEOUS at 05:06

## 2025-06-20 RX ADMIN — MIDAZOLAM HYDROCHLORIDE 2 MG: 1 INJECTION, SOLUTION INTRAMUSCULAR; INTRAVENOUS at 12:06

## 2025-06-20 RX ADMIN — DEXAMETHASONE SODIUM PHOSPHATE 4 MG: 4 INJECTION, SOLUTION INTRAMUSCULAR; INTRAVENOUS at 12:06

## 2025-06-20 RX ADMIN — OXYCODONE 5 MG: 5 TABLET ORAL at 10:06

## 2025-06-20 RX ADMIN — ROCURONIUM BROMIDE 25 MG: 10 SOLUTION INTRAVENOUS at 01:06

## 2025-06-20 RX ADMIN — ALBUMIN (HUMAN) 100 ML: 12.5 SOLUTION INTRAVENOUS at 01:06

## 2025-06-20 RX ADMIN — PROPOFOL 200 MG: 10 INJECTION, EMULSION INTRAVENOUS at 12:06

## 2025-06-20 RX ADMIN — FENTANYL CITRATE 50 MCG: 50 INJECTION, SOLUTION INTRAMUSCULAR; INTRAVENOUS at 01:06

## 2025-06-20 RX ADMIN — CLINDAMYCIN IN 5 PERCENT DEXTROSE 900 MG: 18 INJECTION, SOLUTION INTRAVENOUS at 12:06

## 2025-06-20 RX ADMIN — Medication 50 MG: at 12:06

## 2025-06-20 RX ADMIN — ONDANSETRON 8 MG: 8 TABLET, ORALLY DISINTEGRATING ORAL at 08:06

## 2025-06-20 RX ADMIN — SODIUM CHLORIDE, SODIUM LACTATE, POTASSIUM CHLORIDE, AND CALCIUM CHLORIDE: 600; 310; 30; 20 INJECTION, SOLUTION INTRAVENOUS at 12:06

## 2025-06-20 RX ADMIN — SUCCINYLCHOLINE CHLORIDE 200 MG: 20 INJECTION, SOLUTION INTRAMUSCULAR; INTRAVENOUS at 12:06

## 2025-06-20 RX ADMIN — SUGAMMADEX 400 MG: 100 INJECTION, SOLUTION INTRAVENOUS at 04:06

## 2025-06-20 RX ADMIN — ONDANSETRON HYDROCHLORIDE 4 MG: 2 INJECTION INTRAMUSCULAR; INTRAVENOUS at 12:06

## 2025-06-20 RX ADMIN — OXYCODONE 5 MG: 5 TABLET ORAL at 05:06

## 2025-06-20 RX ADMIN — HYDROMORPHONE HYDROCHLORIDE 0.4 MG: 2 INJECTION INTRAMUSCULAR; INTRAVENOUS; SUBCUTANEOUS at 04:06

## 2025-06-20 RX ADMIN — SODIUM CHLORIDE, SODIUM LACTATE, POTASSIUM CHLORIDE, AND CALCIUM CHLORIDE: 600; 310; 30; 20 INJECTION, SOLUTION INTRAVENOUS at 03:06

## 2025-06-20 RX ADMIN — ROCURONIUM BROMIDE 20 MG: 10 SOLUTION INTRAVENOUS at 03:06

## 2025-06-20 NOTE — TRANSFER OF CARE
"Anesthesia Transfer of Care Note    Patient: Pinky Kate    Procedure(s) Performed: Procedure(s) (LRB):  HYSTERECTOMY, TOTAL, LAPAROSCOPIC, W/ SALPINGECTOMY (N/A)    Patient location: PACU    Anesthesia Type: general    Transport from OR: Transported from OR on 6-10 L/min O2 by face mask with adequate spontaneous ventilation    Post pain: adequate analgesia    Post assessment: no apparent anesthetic complications and tolerated procedure well    Post vital signs: stable    Level of consciousness: awake and alert    Nausea/Vomiting: no nausea/vomiting    Complications: none    Transfer of care protocol was followed      Last vitals: Visit Vitals  BP (!) 145/87   Pulse 67   Temp 36.2 °C (97.2 °F) (Temporal)   Resp 18   Ht 5' 5" (1.651 m)   Wt 132.5 kg (292 lb)   LMP 06/18/2025   SpO2 100%   Breastfeeding No   BMI 48.59 kg/m²     "

## 2025-06-20 NOTE — INTERVAL H&P NOTE
The patient has been examined and the H&P has been reviewed:    I concur with the findings and no changes have occurred since H&P was written.    Surgery risks, benefits and alternative options discussed and understood by patient/family.    Pinky Kate is 34 y.o.  presenting for scheduled TLH/BS.    Temp:  [97.9 °F (36.6 °C)] 97.9 °F (36.6 °C)  Pulse:  [67] 67  Resp:  [18] 18  SpO2:  [100 %] 100 %  BP: (145)/(87) 145/87    General: NAD, alert, oriented, cooperative  HEENT: NCAT, EOM grossly intact  Lungs: Normal WOB  Heart: regular rate  Abdomen: soft, nondistended, nontender, no rebound or guarding    Consents in chart. Pre-operative heparin not indicated. All questions answered and concerns addressed. To OR for planned procedure.    Active Hospital Problems    Diagnosis  POA    *s/p TLH/BS [Z90.710]  Unknown      Resolved Hospital Problems   No resolved problems to display.       Felipa Martinez MD  Obstetrics and Gynecology, PGY-2

## 2025-06-20 NOTE — OP NOTE
OPERATIVE REPORT    DATE: 25    PREOPERATIVE DIAGNOSIS  1. Abnormal uterine bleeding  2.  Uterine fibroids    POSTOPERATIVE DIAGNOSIS  1-2. Same    PROCEDURE:  1. Total Laparoscopic Hysterectomy  2. Bilateral salpingectomy    SURGEON: Brenda Staples MD    ASSISTANTS:   Nyla Perez MD  2.   Felipa Martinez MD     ANESTHESIA: General    COMPLICATIONS: None    EBL: 150 cc    URINE OUTPUT: See anesthesia report    FINDINGS: Normal external female genitalia. Long, narrow vaginal vault. Uterus sounded to 14 cm. Laparoscopically, normal bilateral ovaries and fallopian tubes. Moderate adhesive disease from prior  deliveries between the anterior lower uterine segment and bladder. Enlarged, globular uterus with limited mobility. Adhesions from sigmoid colon to left pelvic sidewall. Normal large and small bowel, normal appendix, liver, and stomach. Normal cardiac window. Uterus removed vaginally using contained vaginal morcellation in Javon bag with ExCITE technique. Incidental vaginal cuff laceration repaired with Endostitch. Cuff closed laparoscopically with Endostitch. Bilateral ureters visualized in their normal anatomic positions with good peristalsis at the beginning and end of the case. Roya powder applied to vaginal cuff along left aspect where a small amount of oozing from posterior peritoneum was noted. Excellent hemostasis at the end of the case from all vascular pedicles. Small incidental vaginal lacerations at the introitus at 8:00 repaired with 2-0 vicryl on CT and small suburethral laceration repaired with 3-0 vicryl on SH respectively. Alcala draining clear urine at end of case.     PROCEDURE: Patient was taken to the operating room where general anesthesia was administered and found to be adequate. She was prepped and draped in the dorsal lithotomy position. A alcala catheter was placed into the bladder. Two right angle retractors were used to visualize the cervix. The anterior lip of the  cervix was grasped with a single tooth tenaculum. The uterus was sounded to approximately 14 cm and serially dilated with Hegar dilators to accommodate a JAROD uterine manipulator, which was placed inside the endometrial cavity without difficulty after placing stay sutures of 0-vicryl at 12 and 6 o'clock. The occluder balloon was inflated and all other instruments were removed from the patient's vagina. Gloves were changed.     Attention was turned to the anterior abdominal wall. The abdominal wall was tented up and a Veress needle was inserted through the umbilicus, placement into the peritoneal cavity was confirmed via saline drop test. The abdomen was insufflated with CO2 to a pressure of 15mm Hg. Local anesthetic was injected at each port site beginning with the umbilicus for improved postoperative analgesia. A 10 mm infraumbilical incision was made with a scalpel.  A 10 mm Optiview trocar with the laparoscope was advanced through this incision into the abdominal cavity, ensuring no damage to intraabdominal contents upon initial entry. Excellent hemostasis was noted. An abdominal survey revealed the aforementioned findings. The patient was placed in deep Trendelenburg. A 5 mm skin incision was made with a scalpel in the LLQ and a 5 mm Airseal trocar was advanced through this incision under visualization of the camera. A 5 mm skin incision was made with the scalpel in the RLQ and a 5 mm trocar was advanced through this incision under direct visualization. Excellent hemostasis was noted. A pelvic survey revealed the aforementioned findings.     Attention was turned to the fimbriated end of the right fallopian tube which was grasped with a grasper and transected from the ovary along the mesosalpinx using the Ligasure to the level of the cornua. The left round ligament was identified, cauterized, and transected, followed by the left utero-ovarian ligament. The anterior and medial leaves of the broad ligament were   and dissected infero-medially to the level of the vesicouterine peritoneum. Adhesive disease was identified and dissected with the Ligasure. The bladder was backfilled with 150cc of normal saline to delineate its borders and ensure safe dissection of the uterovesical peritoneum. The bladder flap was identified and created from the right to left aspect of the lower uterine segment, ensuring the bladder was well out of the operative field. The posterior leaf of the broad ligament was cauterized and transected to the level of the uterine vessels. After proper identification of the JAROD cup, skeletonization of the right uterine vessels was performed and these were cauterized and transected with good hemostasis.     Attention was turned to the left cornua. Due to adhesive disease, the left fallopian tube was transected from the uterine cornua and left in situ until completion of the hysterectomy portion of the case. The left round ligament was identified, cauterized, and transected, followed by the left utero-ovarian ligament. The anterior and medial leaves of the left broad ligament were dissected infero-medially and finally anteriorly to finish creating the bladder flap. The left uterine vessels were identified, skeletonized and then cauterized and transected with good hemostasis noted. Attention was turned to the anterior portion of the cervix. The bladder had already been dissected off the cervix and anterior colpotomy was created with electrocautery, and additional bites were taken laterally with the Ligasure to ensure hemostasis at the level of the uterine arteries. The posterior colpotomy was created and carried around to the anterior colpotomy bilaterally. The uterus, cervix, and right fallopian tube were then removed vaginally using contained morcellation with the Javon bag and the ExCITE technique. A moist laparotomy sponge inside of glove was placed inside the vagina to maintain intraperitoneal  pressure.     Gloves were changed. Attention was turned to the left fallopian tube which was transected from the ovary along the mesosalpinx using the Ligasure and was removed from the abdomen under direct visualization and handed off the field. The vaginal cuff was inspected and a small incidental laceration along the left aspect was noted. This was closed with a 0-vicryl in a mattress fashion with the Endostitch. Hemostasis was excellent. The vaginal cuff was closed with two 0-vicryl interrupted sutures at each angle, followed by a running 0-vicyrl V-loc suture using Endostitch. A small area of bleeding along the posterior peritoneum was identified and hemostasis achieved with electrocautery and Roya powder. Once again the pelvis was inspected and hemostasis was excellent from all vascular pedicles including the vaginal cuff. Ureters were visualized at the pelvic brim bilaterally, good peristalsis noted.    The Suture Ease device was used to close the fascia of the 10 mm infraumbilical port, after which time all other instruments and trocars were removed under direct visualization. The abdomen was deflated of its contents. Attention was turned to the anterior abdominal wall. The skin incisions were closed with 4-0 Monocryl in subcuticular fashion and Dermabond glue was placed. The rest of the 0.25% marcaine was injected at each port site for improved postoperative analgesia.    Attention was turned to the vagina. The vaginal cuff was palpated and felt to be intact. Two incidental lacerations were noted and repaired as mentioned above, after which time hemostasis was excellent. Sponge, lap, and needle counts were correct x 2. The patient was taken to the recovery room in stable condition with the alcala draining clear urine.        Brenda Staples MD  Department of Obstetrics & Gynecology  [facility]

## 2025-06-20 NOTE — ANESTHESIA POSTPROCEDURE EVALUATION
Anesthesia Post Evaluation    Patient: Pinky Kate    Procedure(s) Performed: Procedure(s) (LRB):  HYSTERECTOMY, TOTAL, LAPAROSCOPIC, W/ SALPINGECTOMY (N/A)    Final Anesthesia Type: general      Patient location during evaluation: PACU  Patient participation: Yes- Able to Participate  Level of consciousness: awake and alert  Post-procedure vital signs: reviewed and stable  Pain management: adequate  Airway patency: patent    PONV status at discharge: No PONV  Anesthetic complications: no      Cardiovascular status: blood pressure returned to baseline  Respiratory status: unassisted and spontaneous ventilation  Hydration status: euvolemic  Follow-up not needed.              Vitals Value Taken Time   /103 06/20/25 16:38   Temp 36.2 °C (97.2 °F) 06/20/25 16:35   Pulse 96 06/20/25 16:45   Resp 16 06/20/25 16:35   SpO2 100 % 06/20/25 16:45   Vitals shown include unfiled device data.      No case tracking events are documented in the log.      Pain/Sterling Score: Pain Rating Prior to Med Admin: 0 (6/20/2025  9:37 AM)           Protopic Counseling: Patient may experience a mild burning sensation during topical application. Protopic is not approved in children less than 2 years of age. There have been case reports of hematologic and skin malignancies in patients using topical calcineurin inhibitors although causality is questionable.

## 2025-06-20 NOTE — BRIEF OP NOTE
[facility] Surgery ([place])  Brief Operative Note    Surgery Date: 2025     Surgeons and Role:     * Brenda Staples MD - Primary     * Nyla Perez MD - Resident - Assisting     * Felipa Stewart MD - Resident - Assisting    Pre-op Diagnosis:    Abnormal uterine bleeding (AUB) [N93.9]  2.  Uterine leiomyoma, unspecified location [D25.9]    Post-op Diagnosis:  Post-Op Diagnosis Codes:     * Abnormal uterine bleeding (AUB) [N93.9]     * Uterine leiomyoma, unspecified location [D25.9]    Procedure(s) (LRB):  HYSTERECTOMY, TOTAL, LAPAROSCOPIC, W/ SALPINGECTOMY (N/A)    Anesthesia: General    Operative Findings: Normal external female genitalia. Long, narrow vaginal vault. Uterus sounded to 14 cm. Laparoscopically, normal bilateral ovaries and fallopian tubes. Moderate adhesive disease from prior  deliveries between the anterior lower uterine segment and bladder. Enlarged, globular uterus with limited mobility. Adhesions from sigmoid colon to left pelvic sidewall. Normal large and small bowel, normal appendix, liver, and stomach. Normal cardiac window. Uterus removed vaginally using contained vaginal morcellation in Javon bag with ExCITE technique. Incidental vaginal cuff laceration repaired with Endostitch. Cuff closed laparoscopically with Endostitch. Bilateral ureters visualized in their normal anatomic positions with good peristalsis at the beginning and end of the case. Roya powder applied to vaginal cuff along left aspect where a small amount of oozing from posterior peritoneum was noted. Excellent hemostasis at the end of the case from all vascular pedicles. Small incidental vaginal lacerations at the introitus at 8:00 repaired with 2-0 vicryl on CT and small suburethral laceration repaired with 3-0 vicryl on SH respectively. Bella draining clear urine at end of case.      Estimated Blood Loss: 150 cc         Specimens:   Specimen (24h ago, onward)       Start     Ordered    25 1600   Specimen to Pathology  RELEASE UPON ORDERING        References:    Click here for ordering Quick Tip   Question:  Release to patient  Answer:  Immediate    06/20/25 1600                    ID Type Source Tests Collected by Time Destination   1 : 1. uterus cervix right tube Tissue Uterus, Cervix, Right Fallopian Tube SPECIMEN TO PATHOLOGY Brenda Staples MD 6/20/2025 0809    2 : 2. left tube Tissue Fallopian Tube, Left SPECIMEN TO PATHOLOGY Brenda Staples MD 6/20/2025 2023            Discharge Note    OUTCOME: Patient tolerated treatment/procedure well without complication and is now ready for discharge.    DISPOSITION: Home or Self Care    FINAL DIAGNOSIS:  S/P laparoscopic hysterectomy    FOLLOWUP: In clinic in 2 and 6 weeks as scheduled    DISCHARGE INSTRUCTIONS:    Discharge Procedure Orders   Diet Adult Regular     Lifting restrictions   Order Comments: Avoid lifting more than 10 pounds (or a gallon of milk) for 4 weeks.     No driving until:   Order Comments: Do not drive until you are no longer taking narcotic pain medications and you feel comfortable stepping on the brake.     Pelvic Rest   Order Comments: Nothing in the vagina including tampons and intercourse for 4 weeks.     Notify your health care provider if you experience any of the following:  temperature >100.4     Notify your health care provider if you experience any of the following:  persistent nausea and vomiting or diarrhea     Notify your health care provider if you experience any of the following:  severe uncontrolled pain     Notify your health care provider if you experience any of the following:  redness, tenderness, or signs of infection (pain, swelling, redness, odor or green/yellow discharge around incision site)     Notify your health care provider if you experience any of the following:  severe persistent headache     Notify your health care provider if you experience any of the following:  persistent dizziness, light-headedness,  or visual disturbances     Notify your health care provider if you experience any of the following:  increased confusion or weakness     Notify your health care provider if you experience any of the following:   Order Comments: BLEEDING PRECAUTIONS: Please report to the Emergency Room or contact your physician if you are saturating 1 thick overnight pad per hour for 2 consecutive hours.    CONSTIPATION REMEDIES: Patients are often constipated after surgery or with use of narcotic pain medicine. Remain adequately hydrated by consuming 8 standard water bottles daily. Take a stool softener (Colace or Sennakot) daily, or Mirilax if you prefer. If you have not had a bowel movement for 3 days after surgery, or are uncomfortable and unable to pass stool, please try one or all of the following measures:  1.  Milk of Magnesia - 30 cc by mouth every 12 hours   2.  Dulcolax suppository - one suppository per rectum every 4-6 hours   3.  Metamucil, Fibercon or other bulk former - use as directed on packaging  4.  Fleet enema     Activity as tolerated     Shower on day dressing removed (No bath)   Order Comments: You may shower on day of surgery, with assistance if needed. Use mild unscented soap and allow soapy water to run over incisions/bandages. Avoid scrubbing incision sites. Avoid submerging in water (baths, pools, hot tubs) for 4 weeks.    You may remove your bandaids after 24 hours. The white steri-strip bandages should remain covering wounds until they naturally begin to peel off (usually in 1-2 weeks). Once the steri-strips come off, you may keep the incision open to air, unless it is your preference to put bandaids on.       Brenda Staples MD  Department of Obstetrics & Gynecology  [facility]

## 2025-06-21 VITALS
BODY MASS INDEX: 48.67 KG/M2 | SYSTOLIC BLOOD PRESSURE: 100 MMHG | RESPIRATION RATE: 18 BRPM | WEIGHT: 292.13 LBS | HEART RATE: 68 BPM | DIASTOLIC BLOOD PRESSURE: 51 MMHG | TEMPERATURE: 99 F | OXYGEN SATURATION: 96 % | HEIGHT: 65 IN

## 2025-06-21 PROCEDURE — 63600175 PHARM REV CODE 636 W HCPCS

## 2025-06-21 PROCEDURE — 25000003 PHARM REV CODE 250

## 2025-06-21 PROCEDURE — 94799 UNLISTED PULMONARY SVC/PX: CPT

## 2025-06-21 PROCEDURE — 94761 N-INVAS EAR/PLS OXIMETRY MLT: CPT

## 2025-06-21 PROCEDURE — 51700 IRRIGATION OF BLADDER: CPT

## 2025-06-21 RX ORDER — ENOXAPARIN SODIUM 100 MG/ML
40 INJECTION SUBCUTANEOUS EVERY 12 HOURS
Status: DISCONTINUED | OUTPATIENT
Start: 2025-06-21 | End: 2025-06-21 | Stop reason: HOSPADM

## 2025-06-21 RX ORDER — DEXTROMETHORPHAN HYDROBROMIDE, GUAIFENESIN 5; 100 MG/5ML; MG/5ML
650 LIQUID ORAL EVERY 6 HOURS PRN
Qty: 30 TABLET | Refills: 0 | Status: SHIPPED | OUTPATIENT
Start: 2025-06-21

## 2025-06-21 RX ORDER — DOCUSATE SODIUM 100 MG/1
100 CAPSULE, LIQUID FILLED ORAL 2 TIMES DAILY
Qty: 120 CAPSULE | Refills: 0 | Status: SHIPPED | OUTPATIENT
Start: 2025-06-21 | End: 2025-08-20

## 2025-06-21 RX ORDER — OXYCODONE HYDROCHLORIDE 5 MG/1
5 TABLET ORAL EVERY 6 HOURS PRN
Qty: 15 TABLET | Refills: 0 | Status: SHIPPED | OUTPATIENT
Start: 2025-06-21

## 2025-06-21 RX ADMIN — DOCUSATE SODIUM 100 MG: 100 CAPSULE, LIQUID FILLED ORAL at 12:06

## 2025-06-21 RX ADMIN — ACETAMINOPHEN 1000 MG: 500 TABLET, FILM COATED ORAL at 12:06

## 2025-06-21 RX ADMIN — OXYCODONE HYDROCHLORIDE 10 MG: 10 TABLET ORAL at 02:06

## 2025-06-21 RX ADMIN — KETOROLAC TROMETHAMINE 15 MG: 30 INJECTION, SOLUTION INTRAMUSCULAR; INTRAVENOUS at 12:06

## 2025-06-21 RX ADMIN — ACETAMINOPHEN 1000 MG: 500 TABLET, FILM COATED ORAL at 06:06

## 2025-06-21 RX ADMIN — SODIUM CHLORIDE, POTASSIUM CHLORIDE, SODIUM LACTATE AND CALCIUM CHLORIDE: 600; 310; 30; 20 INJECTION, SOLUTION INTRAVENOUS at 12:06

## 2025-06-21 RX ADMIN — KETOROLAC TROMETHAMINE 15 MG: 30 INJECTION, SOLUTION INTRAMUSCULAR; INTRAVENOUS at 06:06

## 2025-06-21 RX ADMIN — ENOXAPARIN SODIUM 40 MG: 40 INJECTION SUBCUTANEOUS at 12:06

## 2025-06-21 NOTE — PLAN OF CARE
Problem: Adult Inpatient Plan of Care  Goal: Plan of Care Review  6/21/2025 1322 by Tanisha Hwang RN  Outcome: Met  6/21/2025 0841 by Tanisha Hwang RN  Outcome: Progressing  Goal: Patient-Specific Goal (Individualized)  6/21/2025 1322 by Tanisha Hwang RN  Outcome: Met  6/21/2025 0841 by Tanisha Hwang RN  Outcome: Progressing  Goal: Absence of Hospital-Acquired Illness or Injury  6/21/2025 1322 by Tanisha Hwang RN  Outcome: Met  6/21/2025 0841 by Tanisha Hwang RN  Outcome: Progressing  Goal: Optimal Comfort and Wellbeing  6/21/2025 1322 by Tanisha Hwang RN  Outcome: Met  6/21/2025 0841 by Tanisha Hwang RN  Outcome: Progressing  Goal: Readiness for Transition of Care  6/21/2025 1322 by Tanisha Hwang RN  Outcome: Met  6/21/2025 0841 by Tanisha Hwang RN  Outcome: Progressing     Problem: Bariatric Environmental Safety  Goal: Safety Maintained with Care  6/21/2025 1322 by Tanisha Hwang RN  Outcome: Met  6/21/2025 0841 by Tanisha Hwang RN  Outcome: Progressing     Problem: Wound  Goal: Optimal Coping  6/21/2025 1322 by Tanisha Hwang RN  Outcome: Met  6/21/2025 0841 by Tanisha Hwang RN  Outcome: Progressing  Goal: Optimal Functional Ability  6/21/2025 1322 by Tanisha Hwang RN  Outcome: Met  6/21/2025 0841 by Tanisha Hwang RN  Outcome: Progressing  Goal: Absence of Infection Signs and Symptoms  6/21/2025 1322 by Tanisha Hwang RN  Outcome: Met  6/21/2025 0841 by Tanisha Hwang RN  Outcome: Progressing  Goal: Improved Oral Intake  6/21/2025 1322 by Tanisha Hwang RN  Outcome: Met  6/21/2025 0841 by Tanisha Hwang RN  Outcome: Progressing  Goal: Optimal Pain Control and Function  6/21/2025 1322 by Tanisha Hwang RN  Outcome: Met  6/21/2025 0841 by Tanihsa Hwang RN  Outcome: Progressing  Goal: Skin Health and Integrity  6/21/2025 1322 by Tanisha Hwang RN  Outcome: Met  6/21/2025 0841 by Tanisha Hwang, RN  Outcome: Progressing  Goal: Optimal Wound  Healing  6/21/2025 1322 by Tanisha Hwang RN  Outcome: Met  6/21/2025 0841 by Tanisha Hwang RN  Outcome: Progressing     Problem: Infection  Goal: Absence of Infection Signs and Symptoms  6/21/2025 1322 by Tanisha Hwang RN  Outcome: Met  6/21/2025 0841 by Tanisha Hwang RN  Outcome: Progressing

## 2025-06-21 NOTE — DISCHARGE SUMMARY
Discharge Summary  Gynecology    Admit Date: 2025    Discharge Date and Time: 2025     Attending Physician: Brenda Staples MD    Principal Diagnoses: S/P laparoscopic hysterectomy    Active Hospital Problems    Diagnosis  POA    *s/p TLH/BS [Z90.710]  Unknown      Resolved Hospital Problems   No resolved problems to display.     Procedures: Procedure(s) (LRB):  HYSTERECTOMY, TOTAL, LAPAROSCOPIC, W/ SALPINGECTOMY (N/A)    Discharged Condition: stable    Hospital Course:  Pinky Kate is a 34 y.o.  female who presented on 2025 for above procedures for the management of AUB-L. Patient tolerated procedure. Post-operative course was uncomplicated.    On day of discharge, patient was urinating, ambulating, and tolerating a regular diet without difficulty. Pain was well controlled on PO medication. She was discharged home on POD#1 in stable condition with instructions to follow up with Dr. Staples on 25.     Consults: None    Significant Diagnostic Studies:  Recent Labs   Lab 25  0825   WBC 5.74   HGB 12.5   HCT 36.4*   MCV 84         Treatments:   1. Surgery as above    Disposition: Home or Self Care    Patient Instructions:   Current Discharge Medication List        START taking these medications    Details   acetaminophen (TYLENOL) 650 MG TbSR Take 1 tablet (650 mg total) by mouth every 6 (six) hours as needed.  Qty: 30 tablet, Refills: 0      docusate sodium (COLACE) 100 MG capsule Take 1 capsule (100 mg total) by mouth 2 (two) times daily.  Qty: 120 capsule, Refills: 0      nitrofurantoin, macrocrystal-monohydrate, (MACROBID) 100 MG capsule Take 1 capsule (100 mg total) by mouth once daily.  Qty: 10 capsule, Refills: 0      oxyCODONE (ROXICODONE) 5 MG immediate release tablet Take 1 tablet (5 mg total) by mouth every 6 (six) hours as needed for Pain.  Qty: 15 tablet, Refills: 0    Comments: Quantity prescribed more than 7 day supply? No  Associated Diagnoses: S/P  laparoscopic hysterectomy      phenazopyridine (PYRIDIUM) 100 MG tablet Take 1 tablet (100 mg total) by mouth 3 (three) times daily as needed for Pain.  Qty: 30 tablet, Refills: 0           CONTINUE these medications which have CHANGED    Details   ibuprofen (ADVIL,MOTRIN) 600 MG tablet Take 1 tablet (600 mg total) by mouth every 6 (six) hours as needed for Pain.  Qty: 30 tablet, Refills: 0           STOP taking these medications       acetaminophen (TYLENOL ORAL) Comments:   Reason for Stopping:         medroxyPROGESTERone (PROVERA) 10 MG tablet Comments:   Reason for Stopping:               Discharge Procedure Orders   Diet Adult Regular     Lifting restrictions   Order Comments: Avoid lifting more than 10 pounds (or a gallon of milk) for 4 weeks.     No driving until:   Order Comments: Do not drive until you are no longer taking narcotic pain medications and you feel comfortable stepping on the brake.     Pelvic Rest   Order Comments: Nothing in the vagina including tampons and intercourse for 4 weeks.     Notify your health care provider if you experience any of the following:  temperature >100.4     Notify your health care provider if you experience any of the following:  persistent nausea and vomiting or diarrhea     Notify your health care provider if you experience any of the following:  severe uncontrolled pain     Notify your health care provider if you experience any of the following:  redness, tenderness, or signs of infection (pain, swelling, redness, odor or green/yellow discharge around incision site)     Notify your health care provider if you experience any of the following:  severe persistent headache     Notify your health care provider if you experience any of the following:  persistent dizziness, light-headedness, or visual disturbances     Notify your health care provider if you experience any of the following:  increased confusion or weakness     Notify your health care provider if you  experience any of the following:   Order Comments: BLEEDING PRECAUTIONS: Please report to the Emergency Room or contact your physician if you are saturating 1 thick overnight pad per hour for 2 consecutive hours.    CONSTIPATION REMEDIES: Patients are often constipated after surgery or with use of narcotic pain medicine. Remain adequately hydrated by consuming 8 standard water bottles daily. Take a stool softener (Colace or Sennakot) daily, or Mirilax if you prefer. If you have not had a bowel movement for 3 days after surgery, or are uncomfortable and unable to pass stool, please try one or all of the following measures:  1.  Milk of Magnesia - 30 cc by mouth every 12 hours   2.  Dulcolax suppository - one suppository per rectum every 4-6 hours   3.  Metamucil, Fibercon or other bulk former - use as directed on packaging  4.  Fleet enema     Activity as tolerated     Shower on day dressing removed (No bath)   Order Comments: You may shower on day of surgery, with assistance if needed. Use mild unscented soap and allow soapy water to run over incisions/bandages. Avoid scrubbing incision sites. Avoid submerging in water (baths, pools, hot tubs) for 4 weeks.    You may remove your bandaids after 24 hours. The white steri-strip bandages should remain covering wounds until they naturally begin to peel off (usually in 1-2 weeks). Once the steri-strips come off, you may keep the incision open to air, unless it is your preference to put bandaids on.        Follow-up Information       Brenda Staples MD. Go on 7/7/2025.    Specialty: Obstetrics and Gynecology  Why: Post-op follow up  Contact information:  [address] [address]  [phone]                             Iban Marshall MD MS  OB/Gyn  PGY-2

## 2025-06-21 NOTE — DISCHARGE SUMMARY
Discharge Summary  Gynecology      Admit Date: 2025    Discharge Date and Time: 2025     Attending Physician: Brenda Staples MD    Principal Diagnoses: S/P laparoscopic hysterectomy    Active Hospital Problems    Diagnosis  POA    *s/p TLH/BS [Z90.710]  Unknown      Resolved Hospital Problems   No resolved problems to display.       Procedures: Procedure(s) (LRB):  HYSTERECTOMY, TOTAL, LAPAROSCOPIC, W/ SALPINGECTOMY (N/A)    Discharged Condition: stable    Hospital Course:  Pinky Kate is a 34 y.o.  female who presented on 2025 for above procedures for the management of AUB-L. Patient tolerated procedure. Post-operative course was complicated by failure to void after active void trial. She was discharged with alcala catheter in place and instructed to follow up with provider in 3 days for repeat void trial.   On day of discharge, patient was ambulating, and tolerating a regular diet without difficulty. Pain was well controlled on PO medication. She was discharged home on POD#0 in stable condition with instructions to follow up with Dr. Staples on 25.     Consults: None    Significant Diagnostic Studies:  Recent Labs   Lab 25  0825   WBC 5.74   HGB 12.5   HCT 36.4*   MCV 84           Treatments:   1. Surgery as above    Disposition: Home or Self Care    Patient Instructions:   Current Discharge Medication List        START taking these medications    Details   acetaminophen (TYLENOL) 650 MG TbSR Take 1 tablet (650 mg total) by mouth every 6 (six) hours as needed.  Qty: 30 tablet, Refills: 0      docusate sodium (COLACE) 100 MG capsule Take 1 capsule (100 mg total) by mouth 2 (two) times daily.  Qty: 120 capsule, Refills: 0      nitrofurantoin, macrocrystal-monohydrate, (MACROBID) 100 MG capsule Take 1 capsule (100 mg total) by mouth once daily.  Qty: 10 capsule, Refills: 0      oxyCODONE (ROXICODONE) 5 MG immediate release tablet Take 1 tablet (5 mg total) by mouth every  6 (six) hours as needed for Pain.  Qty: 15 tablet, Refills: 0    Associated Diagnoses: S/P laparoscopic hysterectomy      phenazopyridine (PYRIDIUM) 100 MG tablet Take 1 tablet (100 mg total) by mouth 3 (three) times daily as needed for Pain.  Qty: 30 tablet, Refills: 0           CONTINUE these medications which have CHANGED    Details   ibuprofen (ADVIL,MOTRIN) 600 MG tablet Take 1 tablet (600 mg total) by mouth every 6 (six) hours as needed for Pain.  Qty: 30 tablet, Refills: 0           STOP taking these medications       acetaminophen (TYLENOL ORAL) Comments:   Reason for Stopping:         medroxyPROGESTERone (PROVERA) 10 MG tablet Comments:   Reason for Stopping:               Discharge Procedure Orders   Diet Adult Regular     Lifting restrictions   Order Comments: Avoid lifting more than 10 pounds (or a gallon of milk) for 4 weeks.     No driving until:   Order Comments: Do not drive until you are no longer taking narcotic pain medications and you feel comfortable stepping on the brake.     Pelvic Rest   Order Comments: Nothing in the vagina including tampons and intercourse for 4 weeks.     Notify your health care provider if you experience any of the following:  temperature >100.4     Notify your health care provider if you experience any of the following:  persistent nausea and vomiting or diarrhea     Notify your health care provider if you experience any of the following:  severe uncontrolled pain     Notify your health care provider if you experience any of the following:  redness, tenderness, or signs of infection (pain, swelling, redness, odor or green/yellow discharge around incision site)     Notify your health care provider if you experience any of the following:  severe persistent headache     Notify your health care provider if you experience any of the following:  persistent dizziness, light-headedness, or visual disturbances     Notify your health care provider if you experience any of the  following:  increased confusion or weakness     Notify your health care provider if you experience any of the following:   Order Comments: BLEEDING PRECAUTIONS: Please report to the Emergency Room or contact your physician if you are saturating 1 thick overnight pad per hour for 2 consecutive hours.    CONSTIPATION REMEDIES: Patients are often constipated after surgery or with use of narcotic pain medicine. Remain adequately hydrated by consuming 8 standard water bottles daily. Take a stool softener (Colace or Sennakot) daily, or Mirilax if you prefer. If you have not had a bowel movement for 3 days after surgery, or are uncomfortable and unable to pass stool, please try one or all of the following measures:  1.  Milk of Magnesia - 30 cc by mouth every 12 hours   2.  Dulcolax suppository - one suppository per rectum every 4-6 hours   3.  Metamucil, Fibercon or other bulk former - use as directed on packaging  4.  Fleet enema     Activity as tolerated     Shower on day dressing removed (No bath)   Order Comments: You may shower on day of surgery, with assistance if needed. Use mild unscented soap and allow soapy water to run over incisions/bandages. Avoid scrubbing incision sites. Avoid submerging in water (baths, pools, hot tubs) for 4 weeks.    You may remove your bandaids after 24 hours. The white steri-strip bandages should remain covering wounds until they naturally begin to peel off (usually in 1-2 weeks). Once the steri-strips come off, you may keep the incision open to air, unless it is your preference to put bandaids on.        Follow-up Information       Brenda Staples MD. Go on 7/7/2025.    Specialty: Obstetrics and Gynecology  Why: Post-op follow up  Contact information:  [address] [address]  [phone]                           Felipa Martinez MD  Obstetrics and Gynecology, PGY-2

## 2025-06-21 NOTE — PLAN OF CARE
Case Management Assessment     PCP: Brenda Staples MD  Pharmacy:   [id number] - [place], [place] - [address]  [address]  [place] [address]  Phone: [phone] Fax: [phone]    [facility]  [address]  [facility] [address]  Phone: [phone] Fax: [phone]    Patient Arrived From: Home  Existing Help at Home: Family    Barriers to Discharge: None known at this time    Discharge Plan:    A. Home with family    B. Home     completed discharge assessment with pt. Pt lives with family (dependent kids). Demographics, PCP, and insurance verified. No home health. No dialysis. Pt completes ADLs without assistance. Pt to discharge home via family transport. Pt has no other needs to be addressed at this time.     [facility] - Med Surg ([address])  Initial Discharge Assessment       Primary Care Provider: No, Primary Doctor    Admission Diagnosis: Abnormal uterine bleeding (AUB) [N93.9]  Uterine leiomyoma, unspecified location [D25.9]  S/P laparoscopic hysterectomy [Z90.710]    Admission Date: 6/20/2025  Expected Discharge Date: 6/20/2025    Transition of Care Barriers: (P) None    Payor: [employer] [facility] / Plan: [facility] / Product Type: Commercial /     Extended Emergency Contact Information  Primary Emergency Contact: Hope Cooper   [facility]  Home Phone: [phone]  Relation: Mother    Discharge Plan A: (P) Home with family  Discharge Plan B: (P) Home      [id number] - [place], [place] - [address]  [address]  [place] [address]  Phone: [phone] Fax: [phone]    [facility]  [address]  [facility] [address]  Phone: [phone] Fax: [phone]      Initial Assessment (most recent)       Adult Discharge Assessment - 06/21/25 1045          Discharge Assessment    Assessment Type Discharge Planning Assessment (P)      Confirmed/corrected address, phone number and insurance Yes  (P)      Confirmed Demographics Correct on Facesheet (P)      Source of Information patient (P)      Communicated CLOTILDE with patient/caregiver Yes (P)      People in Home child(celina), dependent (P)      Facility Arrived From: Home (P)      Do you expect to return to your current living situation? Yes (P)      Do you have help at home or someone to help you manage your care at home? Yes (P)      Who are your caregiver(s) and their phone number(s)? Family (P)      Prior to hospitilization cognitive status: Alert/Oriented (P)      Current cognitive status: Alert/Oriented (P)      Walking or Climbing Stairs Difficulty no (P)      Dressing/Bathing Difficulty no (P)      Equipment Currently Used at Home none (P)      Readmission within 30 days? No (P)      Patient currently being followed by outpatient case management? No (P)      Do you currently have service(s) that help you manage your care at home? No (P)      Do you take prescription medications? Yes (P)      Do you have prescription coverage? Yes (P)      Do you have any problems affording any of your prescribed medications? No (P)      Is the patient taking medications as prescribed? yes (P)      Who is going to help you get home at discharge? Hope Cooper [phone] (P)      How do you get to doctors appointments? car, drives self (P)      Are you on dialysis? No (P)      Do you take coumadin? No (P)      Discharge Plan A Home with family (P)      Discharge Plan B Home (P)      DME Needed Upon Discharge  none (P)      Discharge Plan discussed with: Patient (P)      Transition of Care Barriers None (P)

## 2025-06-21 NOTE — DISCHARGE INSTRUCTIONS
Anesthesia: After Your Surgery  Youve just had surgery. During surgery, you received medication called anesthesia to keep you comfortable and pain-free. After surgery, you may experience some pain or nausea. This is common. Here are some tips for feeling better and recovering after surgery.    Going home  Your doctor or nurse will show you how to take care of yourself when you go home. He or she will also answer your questions. Have an adult family member or friend drive you home. For the first 24 hours after your surgery:  Do not drive or use heavy equipment.  Do not make important decisions or sign legal documents.  Avoid alcohol.  Have someone stay with you, if needed. He or she can watch for problems and help keep you safe.  Take your time getting up from a seated or lying position. You may experience dizziness for 24 hours  Be sure to keep all follow-up appointments with your doctor. And rest after your procedure for as long as your doctor tells you to.    Coping with pain  If you have pain after surgery, pain medication will help you feel better. Take it as directed, before pain becomes severe. Also, ask your doctor or pharmacist about other ways to control pain, such as with heat, ice, and relaxation. And follow any other instructions your surgeon or nurse gives you.    URINARY RETENTION  Should you experience a decrease in your urine output or are unable to urinate following surgery, this can be due to the medications given during surgery.  We recommend you going to the nearest Emergency Department.    Tips for taking pain medication  To get the best relief possible, remember these points:  Pain medications can upset your stomach. Taking them with a little food may help.  Most pain relievers taken by mouth need at least 20 to 30 minutes to take effect.  Taking medication on a schedule can help you remember to take it. Try to time your medication so that you can take it before beginning an activity, such  as dressing, walking, or sitting down for dinner.  Constipation is a common side effect of pain medications. Contact your doctor before taking any medications like laxatives or stool softeners to help relieve constipation. Also ask about any dietary restrictions, because drinking lots of fluids and eating foods like fruits and vegetables that are high in fiber can also help. Remember, dont take laxatives unless your surgeon has prescribed them.  Mixing alcohol and pain medication can cause dizziness and slow your breathing. It can even be fatal. Dont drink alcohol while taking pain medication.  Pain medication can slow your reflexes. Dont drive or operate machinery while taking pain medication.  If your health care provider tells you to take acetaminophen to help relieve your pain, ask him or her how much you are supposed to take each day. (Acetaminophen is the generic name for Tylenol and other brand-name pain relievers.) Acetaminophen or other pain relievers may interact with your prescription medicines or other over-the-counter (OTC) drugs. Some prescription medications contain acetaminophen along with other active ingredients. Using both prescription and OTC acetaminophen for pain can cause you to overdose. The FDA recommends that you read the labels on your OTC medications carefully. This will help you to clearly understand the list of active ingredients, dosing instructions, and any warnings. It may also help you avoid taking too much acetaminophen. If you have questions or don't understand the information, ask your pharmacist or health care provider to explain it to you before you take the OTC medication.    Managing nausea  Some people have an upset stomach after surgery. This is often due to anesthesia, pain, pain medications, or the stress of surgery. The following tips will help you manage nausea and get good nutrition as you recover. If you were on a special diet before surgery, ask your doctor if you  should follow it during recovery. These tips may help:  Dont push yourself to eat. Your body will tell you when to eat and how much.  Start off with clear liquids and soup. They are easier to digest.  Progress to semi-solid foods (mashed potatoes, applesauce, and gelatin) as you feel ready.  Slowly move to solid foods. Dont eat fatty, rich, or spicy foods at first.  Dont force yourself to have three large meals a day. Instead, eat smaller amounts more often.  Take pain medications with a small amount of solid food, such as crackers or toast to avoid nausea.      Call your surgeon if    You feel too sleepy, dizzy, or groggy (medication may be too strong).  You have side effects like nausea, vomiting, or skin changes (rash, itching, or hives).   © 1808-5817 [employer]. [address] [place] [address]. All rights reserved. This information is not intended as a substitute for professional medical care. Always follow your healthcare professional's instructions.      An indwelling urinary catheter is a flexible plastic tube that is inserted through the opening that carries urine from the  bladder to the outside of the body (urethra), to drain urine. The tube is kept in place by a small balloon that is inflated  once the tube is securely in the bladder. Urine drains into a bag that is attached to the thigh through this catheter.      To care for your urinary catheter at home:     Make sure that urine is flowing out of the catheter into the drainage bag.   Check the area around the insertion site for signs of infections (such as inflammation and irritated/swollen/  red/tender skin), and/or leakage of urine around the catheter   Keep the urinary drainage bag below the level of the bladder (to prevent backflow into the bladder).   Make sure that the urinary drainage bag does not drag and pull on the catheter.    Caring for your catheter:     Clean the area around the drainage tube twice each  day.   Use a mild soap and water around the drainage tube.   Rinse well and dry with a clean towel.      Draining the urine collection bag:    The bag that collects urine may be strapped to your thigh. You will need to empty the bag at regular intervals,  typically every 2 to 4 hours, or whenever the catheter bag is half-full, and at bedtime.   Wash your hands with soap and water. If you are emptying another persons catheter bag, you may wish to wear  disposable gloves. Wash your hands before you put the gloves on and after removing them.

## 2025-06-21 NOTE — PROGRESS NOTES
Progress Note  Gynecology   Admit Date: 2025  LOS: 0    Reason for Admission:  S/P laparoscopic hysterectomy    SUBJECTIVE:     Pinky Kate is a 34 y.o.  who is POD#1 s/p TLH/BS for the treatment of AUB-L. Surgery was overall uncomplicated. Patient stayed overnight 2/2 nausea and discomfort with alcala in place after failing AVT.  Pt doing well this morning. Pain is well controlled. Reports nausea is slightly improved however has not tried anything PO outside of fluids. Ambulating without difficulty. Voiding without difficulty via alcala. She is not yet having bowel movements.    OBJECTIVE:     Vital Signs   Temp:  [97.2 °F (36.2 °C)-98 °F (36.7 °C)] 97.5 °F (36.4 °C)  Pulse:  [65-96] 73  Resp:  [16-18] 16  SpO2:  [96 %-100 %] 99 %  BP: (118-168)/() 118/69      Intake/Output Summary (Last 24 hours) at 2025 0555  Last data filed at 2025 0511  Gross per 24 hour   Intake 2820 ml   Output 2550 ml   Net 270 ml       Physical Exam:  Gen: A&Ox3, NAD  CV: RRR  Pulm: normal respiratory effort  Abd: active bowel sounds, soft, non-distended, minimally tender to palpation without rebound or guarding   Inc: 3 port site incisions C/D/I  Ext: PPP, no peripheral edema  : Alcala in place draining clear yellow urine     Laboratory:  Recent Labs   Lab 25  0825   WBC 5.74   HGB 12.5   HCT 36.4*   MCV 84           Recent Labs   Lab 25  0825      K 4.5   *   CO2 22*   BUN 11   CREATININE 1.2           ASSESSMENT/PLAN:     Active Hospital Problems    Diagnosis  POA    *s/p TLH/BS [Z90.710]  Unknown      Resolved Hospital Problems   No resolved problems to display.       Assessment: Pinky Kate is a 34 y.o.  who is POD#1 s/p TLH/BS for the treatment of AUB-L.    Plan:   1. Post-op   - Procedure complications: none  - Routine post-op advances  - IVF @ 40 cc/h; d/c when tolerating PO  - Diet: regular  - Pain control: Tylenol, Toradol > Ibuprofen, Oxy  This office note has been dictated.    Visit time of 20 min., counseling of 15 min.     5/10  - In/Out: Bella in place draining clear urine; d/c Bella in AM POD #1  - Bowel function: -flatus/-BM   - Continue scheduled bowel regimen.   - PRN: simethicone, zofran, phenergan, benadryl  - PPX: ambulation encouraged, IS encouraged, Lovenox  - Encouraged patient to take some bites of food this morning. Repeat void trial this morning. Afternoon round for possible lung time dispo.    Felipa Martinez MD  Obstetrics and Gynecology, PGY-2

## 2025-06-23 ENCOUNTER — PATIENT MESSAGE (OUTPATIENT)
Dept: OBSTETRICS AND GYNECOLOGY | Facility: CLINIC | Age: 35
End: 2025-06-23
Payer: COMMERCIAL

## 2025-06-23 DIAGNOSIS — R11.0 NAUSEA: Primary | ICD-10-CM

## 2025-06-24 RX ORDER — ONDANSETRON 4 MG/1
4 TABLET, ORALLY DISINTEGRATING ORAL EVERY 6 HOURS PRN
Qty: 30 TABLET | Refills: 1 | Status: SHIPPED | OUTPATIENT
Start: 2025-06-24

## 2025-06-24 NOTE — TELEPHONE ENCOUNTER
Called patient to discuss symptoms. Nausea but no vomiting. Sharp pains in pelvic area and feels stomach discomfort after eating. Recommend alternating Ibuprofen w/ Tylenol and oxy q 4 hours, heating pads, and will send Rx for Zofran. Denies fever or chills. Urinating, has had 4 soft bowel movements yesterday. Recommend GasEx as well for gas related pains. Patient to reach out with worsening symptoms.

## 2025-06-25 ENCOUNTER — RESULTS FOLLOW-UP (OUTPATIENT)
Dept: OBSTETRICS AND GYNECOLOGY | Facility: CLINIC | Age: 35
End: 2025-06-25

## 2025-07-01 ENCOUNTER — PATIENT MESSAGE (OUTPATIENT)
Dept: OBSTETRICS AND GYNECOLOGY | Facility: CLINIC | Age: 35
End: 2025-07-01
Payer: COMMERCIAL

## 2025-07-01 ENCOUNTER — OFFICE VISIT (OUTPATIENT)
Dept: OBSTETRICS AND GYNECOLOGY | Facility: CLINIC | Age: 35
End: 2025-07-01
Payer: COMMERCIAL

## 2025-07-01 VITALS
WEIGHT: 287.06 LBS | DIASTOLIC BLOOD PRESSURE: 92 MMHG | BODY MASS INDEX: 47.77 KG/M2 | SYSTOLIC BLOOD PRESSURE: 126 MMHG

## 2025-07-01 DIAGNOSIS — Z48.89 POSTOPERATIVE VISIT: Primary | ICD-10-CM

## 2025-07-01 DIAGNOSIS — Z90.710 S/P LAPAROSCOPIC HYSTERECTOMY: ICD-10-CM

## 2025-07-01 DIAGNOSIS — G89.18 POSTOPERATIVE PAIN: ICD-10-CM

## 2025-07-01 DIAGNOSIS — R32 URINARY INCONTINENCE, UNSPECIFIED TYPE: ICD-10-CM

## 2025-07-01 PROCEDURE — 3080F DIAST BP >= 90 MM HG: CPT | Mod: CPTII,S$GLB,, | Performed by: OBSTETRICS & GYNECOLOGY

## 2025-07-01 PROCEDURE — 3074F SYST BP LT 130 MM HG: CPT | Mod: CPTII,S$GLB,, | Performed by: OBSTETRICS & GYNECOLOGY

## 2025-07-01 PROCEDURE — 99999 PR PBB SHADOW E&M-EST. PATIENT-LVL III: CPT | Mod: PBBFAC,,, | Performed by: OBSTETRICS & GYNECOLOGY

## 2025-07-01 PROCEDURE — 3044F HG A1C LEVEL LT 7.0%: CPT | Mod: CPTII,S$GLB,, | Performed by: OBSTETRICS & GYNECOLOGY

## 2025-07-01 PROCEDURE — 1159F MED LIST DOCD IN RCRD: CPT | Mod: CPTII,S$GLB,, | Performed by: OBSTETRICS & GYNECOLOGY

## 2025-07-01 PROCEDURE — 81003 URINALYSIS AUTO W/O SCOPE: CPT | Performed by: OBSTETRICS & GYNECOLOGY

## 2025-07-01 PROCEDURE — 87077 CULTURE AEROBIC IDENTIFY: CPT | Performed by: OBSTETRICS & GYNECOLOGY

## 2025-07-01 PROCEDURE — 1160F RVW MEDS BY RX/DR IN RCRD: CPT | Mod: CPTII,S$GLB,, | Performed by: OBSTETRICS & GYNECOLOGY

## 2025-07-01 PROCEDURE — 99024 POSTOP FOLLOW-UP VISIT: CPT | Mod: S$GLB,,, | Performed by: OBSTETRICS & GYNECOLOGY

## 2025-07-01 RX ORDER — OXYCODONE HYDROCHLORIDE 5 MG/1
5 TABLET ORAL EVERY 6 HOURS PRN
Qty: 5 TABLET | Refills: 0 | Status: SHIPPED | OUTPATIENT
Start: 2025-07-01

## 2025-07-01 NOTE — ASSESSMENT & PLAN NOTE
Some scant yellowish brown discharge in vault, no peritoneal fluid seen spilling from vagina or filling speculum with valsalva. Cuff palpably intact. Some induration of anterior vaginal wall. Abdominal exam benign. Low suspicion for cuff dehiscence. No bleeding seen. Patient has continued a stool softener postop and avoids straining. LSC incisions healing well and abdominal exam benign. No peritoneal signs.     Involuntary loss of urine - will get UA/urine culture today. Recommend continuing good PO hydration and emptying bladder every 2 hours and continuing to wear pads in the event incontinence episodes continue. Will treat if infectious. Keep postop appointment as scheduled next week to ensure symptoms are improving. May require referral to PFPT for incontinence vs referral to Urogyn depending on the extent of the incontinence.

## 2025-07-01 NOTE — PROGRESS NOTES
"Chief Complaint   Patient presents with    Follow-up       HPI:   34 y.o.  here today for postop visit. She is s/p TLH/BS on 25 for AUB-L refractory to medical management. Her procedure was complicated by long operating time and difficult uterine extraction due to narrow pelvis and large uterus requiring contained vaginal morcellation. She was unable to pass her active void trial and was in a significant amount of pain postop so spent the night in the hospital and was discharged POD#1 in stable condition after passing a second active void trial. She had a small amount of vaginal spotting for a few days after surgery but this has resolved and denies continued vaginal bleeding or abnormal discharge. She denies fever or chills but has woken up in the night feeling alternating "hot and cold." Was initially nauseated after surgery but this improved with PO Zofran, denies vomiting and has been having multiple daily bowel movements, sometimes soft and other times watery. Taking a stool softener to avoid straining     Prior to yesterday she had been urinating without difficulty since alcala removal but with some pain with urination. She reports a sharp "tugging" pain just prior to urination and towards the end of urination, but denies true dysuria or frequency. However starting yesterday she had an episode of frequent involuntary bladder emptying despite using the restroom every 2-3 hours. This happened 3 times, then persisted overnight awakening her from sleep. She changed a pad 6 times overnight after involuntary loss of urine. She does note fewer episodes of incontinence today. Denies sensation of bladder fullness before the big gushes. She has been taking Ibuprofen, Tylenol, stool softener, and Zofran since surgery. She is out of the oxycodone.      Intra-op findings:  Normal external female genitalia. Long, narrow vaginal vault. Uterus sounded to 14 cm. Laparoscopically, normal bilateral ovaries and " fallopian tubes. Moderate adhesive disease from prior  deliveries between the anterior lower uterine segment and bladder. Enlarged, globular uterus with limited mobility. Adhesions from sigmoid colon to left pelvic sidewall. Normal large and small bowel, normal appendix, liver, and stomach. Normal cardiac window. Uterus removed vaginally using contained vaginal morcellation in Javon bag with ExCITE technique. Incidental vaginal cuff laceration repaired with Endostitch. Cuff closed laparoscopically with Endostitch. Bilateral ureters visualized in their normal anatomic positions with good peristalsis at the beginning and end of the case. Roya powder applied to vaginal cuff along left aspect where a small amount of oozing from posterior peritoneum was noted. Excellent hemostasis at the end of the case from all vascular pedicles. Small incidental vaginal lacerations at the introitus at 8:00 repaired with 2-0 vicryl on CT and small suburethral laceration repaired with 3-0 vicryl on SH respectively. Bella draining clear urine at end of case.     Path:  1. Morcellated uterus, cervix and right fallopian tube weighing 331 g showing:   Inactive endometrium  Negative cervix   Multiple intramural leiomyomas identified in this morcellated specimen.  Some have areas of fibrosis and degenerative changes.  Unremarkable right fallopian tube  2. Unremarkable left fallopian tube    Past Medical History:   Diagnosis Date    Anxiety     Pseudotumor cerebri      Past Surgical History:   Procedure Laterality Date     SECTION      x2    HYSTERECTOMY, TOTAL, LAPAROSCOPIC, WITH SALPINGECTOMY N/A 2025    Procedure: HYSTERECTOMY, TOTAL, LAPAROSCOPIC, W/ SALPINGECTOMY;  Surgeon: Brenda Staples MD;  Location: [facility];  Service: OB/GYN;  Laterality: N/A;    TONSILLECTOMY       Current Medications[1]  Review of patient's allergies indicates:   Allergen Reactions    Pcn [penicillins] Swelling     OB History     Para Term  AB Living   2 2 2   2   SAB IAB Ectopic Multiple Live Births       2      # Outcome Date GA Lbr Casey/2nd Weight Sex Type Anes PTL Lv   2 Term      CS-LTranv      1 Term      CS-LTranv        Social History[2]  Family History   Problem Relation Name Age of Onset    Cancer Father      Diabetes Neg Hx      Eclampsia Neg Hx      Hypertension Neg Hx      Miscarriages / Stillbirths Neg Hx       labor Neg Hx      Stroke Neg Hx         Review of Systems   Negative except as in HPI    Physical Exam   Vitals:    25 1349   BP: (!) 126/92     Body mass index is 47.77 kg/m².    Physical Exam  Constitutional:       General: She is not in acute distress.     Appearance: Normal appearance.     Genitourinary:    Vulva and urethral meatus normal.   The external female genitalia was normal.   No external genitalia lesions identified,Genitalia hair distrobution normal .   Sutures intact.  There is vaginal discharge (brownish yellow discharge, thin, not watery or copious) in the vagina. No bleeding in the vagina.    No vaginal prolapse present.     No vaginal atrophy present.     Vaginal exam comments: Other than brownish yellow discharge in vault no peritoneal fluid seen, no filling of speculum with fluid on valsalva, no evidence of vesicovaginal fistula, no urinary leakage with valsalva. Gentle bimanual exam revealed intact cuff and slight anterior vaginal wall induration, but no cuff abscess, cellulitis, active bleeding, foul odor, or other abnormalities encountered.   Right adnexum displays not palpable. Left adnexum displays not palpable. Cervix is absent.Uterus is absent.   HENT:      Head: Normocephalic and atraumatic.   Eyes:      Extraocular Movements: Extraocular movements intact.      Pupils: Pupils are equal, round, and reactive to light.   Pulmonary:      Effort: Pulmonary effort is normal.   Abdominal:      General: Abdomen is flat. There is no distension.      Palpations: Abdomen is soft.       Tenderness: There is no abdominal tenderness. There is no guarding or rebound.      Comments: LSC incisions healing well, no rebound tenderness or guarding   Musculoskeletal:         General: Normal range of motion.      Cervical back: Normal range of motion.   Neurological:      General: No focal deficit present.      Mental Status: She is alert and oriented to person, place, and time.   Skin:     General: Skin is warm and dry.   Psychiatric:         Mood and Affect: Mood normal.         Behavior: Behavior normal.         Thought Content: Thought content normal.   Vitals reviewed.          ASSESSMENT:   Problem List[3]    PLAN:  Problem List Items Addressed This Visit          Renal/    s/p TLH/BS    Relevant Medications    oxyCODONE (ROXICODONE) 5 MG immediate release tablet    Urinary incontinence    Relevant Orders    Urinalysis    Urine Culture High Risk       Other    Postoperative visit - Primary    Current Assessment & Plan   Some scant yellowish brown discharge in vault, no peritoneal fluid seen spilling from vagina or filling speculum with valsalva. Cuff palpably intact. Some induration of anterior vaginal wall. Abdominal exam benign. Low suspicion for cuff dehiscence. No bleeding seen. Patient has continued a stool softener postop and avoids straining. LSC incisions healing well and abdominal exam benign. No peritoneal signs.     Involuntary loss of urine - will get UA/urine culture today. Recommend continuing good PO hydration and emptying bladder every 2 hours and continuing to wear pads in the event incontinence episodes continue. Will treat if infectious. Keep postop appointment as scheduled next week to ensure symptoms are improving. May require referral to PFPT for incontinence vs referral to Urogyn depending on the extent of the incontinence.           Other Visit Diagnoses         Postoperative pain                 Follow up in about 1 week (around 7/8/2025) for Annual.       Brenda Staples,  MD  Department of Obstetrics & Gynecology  [facility]         [1]   Current Outpatient Medications:     acetaminophen (TYLENOL) 650 MG TbSR, Take 1 tablet (650 mg total) by mouth every 6 (six) hours as needed., Disp: 30 tablet, Rfl: 0    docusate sodium (COLACE) 100 MG capsule, Take 1 capsule (100 mg total) by mouth 2 (two) times daily., Disp: 120 capsule, Rfl: 0    ibuprofen (ADVIL,MOTRIN) 600 MG tablet, Take 1 tablet (600 mg total) by mouth every 6 (six) hours as needed for Pain., Disp: 30 tablet, Rfl: 0    nitrofurantoin, macrocrystal-monohydrate, (MACROBID) 100 MG capsule, Take 1 capsule (100 mg total) by mouth once daily., Disp: 10 capsule, Rfl: 0    ondansetron (ZOFRAN-ODT) 4 MG TbDL, Take 1 tablet (4 mg total) by mouth every 6 (six) hours as needed (Nausea)., Disp: 30 tablet, Rfl: 1    oxyCODONE (ROXICODONE) 5 MG immediate release tablet, Take 1 tablet (5 mg total) by mouth every 6 (six) hours as needed for Pain., Disp: 5 tablet, Rfl: 0  [2]   Social History  Tobacco Use    Smoking status: Never    Smokeless tobacco: Never   Vaping Use    Vaping status: Former   Substance Use Topics    Alcohol use: Yes     Comment: occas    Drug use: Never   [3]   Patient Active Problem List  Diagnosis    Right optic neuritis    Papilledema    Empty sella    Symptomatic anemia    Abnormal uterine bleeding (AUB)    Morbid obesity with BMI of 45.0-49.9, adult    IIH (idiopathic intracranial hypertension)    Uterine leiomyoma    s/p TLH/BS    Urinary incontinence    Postoperative visit

## 2025-07-02 LAB
BACTERIA #/AREA URNS AUTO: ABNORMAL /HPF
BILIRUB UR QL STRIP.AUTO: NEGATIVE
CLARITY UR: CLEAR
COLOR UR AUTO: YELLOW
GLUCOSE UR QL STRIP: NEGATIVE
HGB UR QL STRIP: NEGATIVE
KETONES UR QL STRIP: NEGATIVE
LEUKOCYTE ESTERASE UR QL STRIP: ABNORMAL
MICROSCOPIC COMMENT: ABNORMAL
NITRITE UR QL STRIP: NEGATIVE
PH UR STRIP: 6 [PH]
PROT UR QL STRIP: ABNORMAL
RBC #/AREA URNS AUTO: 1 /HPF (ref 0–4)
SP GR UR STRIP: 1.02
SQUAMOUS #/AREA URNS AUTO: 2 /HPF
UROBILINOGEN UR STRIP-ACNC: NEGATIVE EU/DL
WBC #/AREA URNS AUTO: 41 /HPF (ref 0–5)

## 2025-07-03 ENCOUNTER — RESULTS FOLLOW-UP (OUTPATIENT)
Dept: OBSTETRICS AND GYNECOLOGY | Facility: CLINIC | Age: 35
End: 2025-07-03

## 2025-07-03 LAB — BACTERIA UR CULT: ABNORMAL

## 2025-07-07 ENCOUNTER — OFFICE VISIT (OUTPATIENT)
Dept: OBSTETRICS AND GYNECOLOGY | Facility: CLINIC | Age: 35
End: 2025-07-07
Payer: COMMERCIAL

## 2025-07-07 VITALS
HEIGHT: 65 IN | BODY MASS INDEX: 47.75 KG/M2 | WEIGHT: 286.63 LBS | DIASTOLIC BLOOD PRESSURE: 88 MMHG | SYSTOLIC BLOOD PRESSURE: 126 MMHG

## 2025-07-07 DIAGNOSIS — N30.01 ACUTE CYSTITIS WITH HEMATURIA: ICD-10-CM

## 2025-07-07 DIAGNOSIS — Z90.710 S/P LAPAROSCOPIC HYSTERECTOMY: ICD-10-CM

## 2025-07-07 DIAGNOSIS — Z48.89 POSTOPERATIVE VISIT: Primary | ICD-10-CM

## 2025-07-07 PROCEDURE — 1160F RVW MEDS BY RX/DR IN RCRD: CPT | Mod: CPTII,S$GLB,, | Performed by: OBSTETRICS & GYNECOLOGY

## 2025-07-07 PROCEDURE — 99024 POSTOP FOLLOW-UP VISIT: CPT | Mod: S$GLB,,, | Performed by: OBSTETRICS & GYNECOLOGY

## 2025-07-07 PROCEDURE — 3079F DIAST BP 80-89 MM HG: CPT | Mod: CPTII,S$GLB,, | Performed by: OBSTETRICS & GYNECOLOGY

## 2025-07-07 PROCEDURE — 99999 PR PBB SHADOW E&M-EST. PATIENT-LVL III: CPT | Mod: PBBFAC,,, | Performed by: OBSTETRICS & GYNECOLOGY

## 2025-07-07 PROCEDURE — 3044F HG A1C LEVEL LT 7.0%: CPT | Mod: CPTII,S$GLB,, | Performed by: OBSTETRICS & GYNECOLOGY

## 2025-07-07 PROCEDURE — 1159F MED LIST DOCD IN RCRD: CPT | Mod: CPTII,S$GLB,, | Performed by: OBSTETRICS & GYNECOLOGY

## 2025-07-07 PROCEDURE — 3074F SYST BP LT 130 MM HG: CPT | Mod: CPTII,S$GLB,, | Performed by: OBSTETRICS & GYNECOLOGY

## 2025-07-07 RX ORDER — NITROFURANTOIN 25; 75 MG/1; MG/1
100 CAPSULE ORAL 2 TIMES DAILY
Qty: 10 CAPSULE | Refills: 0 | Status: SHIPPED | OUTPATIENT
Start: 2025-07-07 | End: 2025-07-12

## 2025-07-07 NOTE — PROGRESS NOTES
Chief Complaint   Patient presents with    Post-op Evaluation       HPI:   35 y.o.  here today for 2 week postop visit. She is s/p TLH/BS on 25 for AUB-L refractory to medical management. Her procedure was complicated by long operating time and difficult uterine extraction due to narrow pelvis and large uterus requiring contained vaginal morcellation. She was unable to pass her active void trial and was in a significant amount of pain postop so alcala was replaced and she was kept for observation and was discharged POD#1 in stable condition after passing a second active void trial. She had a small amount of vaginal spotting for a few days after surgery but this has resolved and denies continued vaginal bleeding or abnormal discharge. She was seen last week for involuntary bladder emptying requiring a pad use. Gentle speculum exam was performed to ensure no signs of vaginal dehiscence and no pooling of peritoneal fluid or vesicovaginal fistula was noted. A small amount of brownish discharge was seen.      Today she notes her pain has improved and she no longer experiences significant loss of urine/fluid but still sees a small amount of brownish discharge, usually only with wiping. This has continued to decrease since surgery. Denies fever, chills, nausea, vomiting, SOB or CP. She is having regular bowel movements and denies straining.     Intra-op findings:  Normal external female genitalia. Long, narrow vaginal vault. Uterus sounded to 14 cm. Laparoscopically, normal bilateral ovaries and fallopian tubes. Moderate adhesive disease from prior  deliveries between the anterior lower uterine segment and bladder. Enlarged, globular uterus with limited mobility. Adhesions from sigmoid colon to left pelvic sidewall. Normal large and small bowel, normal appendix, liver, and stomach. Normal cardiac window. Uterus removed vaginally using contained vaginal morcellation in Javon bag with ExCITE technique.  Incidental vaginal cuff laceration repaired with Endostitch. Cuff closed laparoscopically with Endostitch. Bilateral ureters visualized in their normal anatomic positions with good peristalsis at the beginning and end of the case. Roya powder applied to vaginal cuff along left aspect where a small amount of oozing from posterior peritoneum was noted. Excellent hemostasis at the end of the case from all vascular pedicles. Small incidental vaginal lacerations at the introitus at 8:00 repaired with 2-0 vicryl on CT and small suburethral laceration repaired with 3-0 vicryl on SH respectively. Bella draining clear urine at end of case.      Path:  1. Morcellated uterus, cervix and right fallopian tube weighing 331 g showing:   Inactive endometrium  Negative cervix   Multiple intramural leiomyomas identified in this morcellated specimen.  Some have areas of fibrosis and degenerative changes.  Unremarkable right fallopian tube  2. Unremarkable left fallopian tube    Past Medical History:   Diagnosis Date    Anxiety     Pseudotumor cerebri      Past Surgical History:   Procedure Laterality Date     SECTION      x2    HYSTERECTOMY, TOTAL, LAPAROSCOPIC, WITH SALPINGECTOMY N/A 2025    Procedure: HYSTERECTOMY, TOTAL, LAPAROSCOPIC, W/ SALPINGECTOMY;  Surgeon: Brenda Staples MD;  Location: [facility];  Service: OB/GYN;  Laterality: N/A;    TONSILLECTOMY       Current Medications[1]  Review of patient's allergies indicates:   Allergen Reactions    Pcn [penicillins] Swelling     OB History    Para Term  AB Living   2 2 2   2   SAB IAB Ectopic Multiple Live Births       2      # Outcome Date GA Lbr Casey/2nd Weight Sex Type Anes PTL Lv   2 Term      CS-LTranv      1 Term      CS-LTranv        Social History[2]  Family History   Problem Relation Name Age of Onset    Cancer Father      Diabetes Neg Hx      Eclampsia Neg Hx      Hypertension Neg Hx      Miscarriages / Stillbirths Neg Hx       labor  Neg Hx      Stroke Neg Hx         Review of Systems   Negative except as in HPI    Physical Exam   Vitals:    07/07/25 1406   BP: 126/88     Body mass index is 47.69 kg/m².    Physical Exam  Constitutional:       General: She is not in acute distress.     Appearance: Normal appearance.   HENT:      Head: Normocephalic and atraumatic.   Eyes:      Extraocular Movements: Extraocular movements intact.      Pupils: Pupils are equal, round, and reactive to light.   Pulmonary:      Effort: Pulmonary effort is normal.   Abdominal:      General: There is no distension.      Palpations: Abdomen is soft.      Tenderness: There is no abdominal tenderness. There is no guarding or rebound.      Comments: LSC incisions healing well   Musculoskeletal:         General: Normal range of motion.      Cervical back: Normal range of motion.   Neurological:      General: No focal deficit present.      Mental Status: She is alert and oriented to person, place, and time.   Skin:     General: Skin is warm and dry.   Psychiatric:         Mood and Affect: Mood normal.         Behavior: Behavior normal.         Thought Content: Thought content normal.   Vitals reviewed.          ASSESSMENT:   Problem List[3]    PLAN:  Problem List Items Addressed This Visit          Renal/    s/p TLH/BS    Acute cystitis with hematuria       Other    Postoperative visit - Primary    Current Assessment & Plan   Improving postop, decreased involuntary urine leakage. Urine culture showed GBS, antibiotics Rx sent to pharmacy. LSC incisions healing well, path benign. Continue routine postop care, RTC for cuff check in 4 weeks. Continue pelvic rest until 8 weeks postop.              Follow up in about 4 weeks (around 8/4/2025) for Postop.       Brenda Staples MD  Department of Obstetrics & Gynecology  [facility]         [1]   Current Outpatient Medications:     acetaminophen (TYLENOL) 650 MG TbSR, Take 1 tablet (650 mg total) by mouth every 6  (six) hours as needed., Disp: 30 tablet, Rfl: 0    docusate sodium (COLACE) 100 MG capsule, Take 1 capsule (100 mg total) by mouth 2 (two) times daily., Disp: 120 capsule, Rfl: 0    ibuprofen (ADVIL,MOTRIN) 600 MG tablet, Take 1 tablet (600 mg total) by mouth every 6 (six) hours as needed for Pain., Disp: 30 tablet, Rfl: 0    ondansetron (ZOFRAN-ODT) 4 MG TbDL, Take 1 tablet (4 mg total) by mouth every 6 (six) hours as needed (Nausea)., Disp: 30 tablet, Rfl: 1    oxyCODONE (ROXICODONE) 5 MG immediate release tablet, Take 1 tablet (5 mg total) by mouth every 6 (six) hours as needed for Pain., Disp: 5 tablet, Rfl: 0  [2]   Social History  Tobacco Use    Smoking status: Never    Smokeless tobacco: Never   Vaping Use    Vaping status: Former   Substance Use Topics    Alcohol use: Yes     Comment: occas    Drug use: Never   [3]   Patient Active Problem List  Diagnosis    Right optic neuritis    Papilledema    Empty sella    Symptomatic anemia    Abnormal uterine bleeding (AUB)    Morbid obesity with BMI of 45.0-49.9, adult    IIH (idiopathic intracranial hypertension)    Uterine leiomyoma    s/p TLH/BS    Urinary incontinence    Postoperative visit    Acute cystitis with hematuria

## 2025-07-26 NOTE — ASSESSMENT & PLAN NOTE
Improving postop, decreased involuntary urine leakage. Urine culture showed GBS, antibiotics Rx sent to pharmacy. LSC incisions healing well, path benign. Continue routine postop care, RTC for cuff check in 4 weeks. Continue pelvic rest until 8 weeks postop.

## 2025-07-28 ENCOUNTER — OFFICE VISIT (OUTPATIENT)
Dept: OBSTETRICS AND GYNECOLOGY | Facility: CLINIC | Age: 35
End: 2025-07-28
Payer: COMMERCIAL

## 2025-07-28 VITALS
BODY MASS INDEX: 48.67 KG/M2 | SYSTOLIC BLOOD PRESSURE: 124 MMHG | WEIGHT: 292.13 LBS | HEIGHT: 65 IN | DIASTOLIC BLOOD PRESSURE: 84 MMHG

## 2025-07-28 DIAGNOSIS — Z48.89 POSTOPERATIVE VISIT: Primary | ICD-10-CM

## 2025-07-28 DIAGNOSIS — Z90.710 S/P LAPAROSCOPIC HYSTERECTOMY: ICD-10-CM

## 2025-07-28 PROCEDURE — 99999 PR PBB SHADOW E&M-EST. PATIENT-LVL III: CPT | Mod: PBBFAC,,, | Performed by: OBSTETRICS & GYNECOLOGY

## 2025-07-28 PROCEDURE — 1159F MED LIST DOCD IN RCRD: CPT | Mod: CPTII,S$GLB,, | Performed by: OBSTETRICS & GYNECOLOGY

## 2025-07-28 PROCEDURE — 3044F HG A1C LEVEL LT 7.0%: CPT | Mod: CPTII,S$GLB,, | Performed by: OBSTETRICS & GYNECOLOGY

## 2025-07-28 PROCEDURE — 3074F SYST BP LT 130 MM HG: CPT | Mod: CPTII,S$GLB,, | Performed by: OBSTETRICS & GYNECOLOGY

## 2025-07-28 PROCEDURE — 3079F DIAST BP 80-89 MM HG: CPT | Mod: CPTII,S$GLB,, | Performed by: OBSTETRICS & GYNECOLOGY

## 2025-07-28 PROCEDURE — 1160F RVW MEDS BY RX/DR IN RCRD: CPT | Mod: CPTII,S$GLB,, | Performed by: OBSTETRICS & GYNECOLOGY

## 2025-07-28 PROCEDURE — 99024 POSTOP FOLLOW-UP VISIT: CPT | Mod: S$GLB,,, | Performed by: OBSTETRICS & GYNECOLOGY

## 2025-07-28 NOTE — PROGRESS NOTES
Chief Complaint   Patient presents with    Post-op Evaluation       HPI:   35 y.o.  here today for 5-6 week postop visit. She is s/p TLH/BS on 25 for AUB-L refractory to medical management. Her procedure was complicated by long operating time and difficult uterine extraction due to narrow pelvis and large uterus requiring contained vaginal morcellation. She was unable to pass her active void trial and was in a significant amount of pain postop so alcala was replaced and she was kept for observation and was discharged POD#1 in stable condition after passing a second active void trial. She had a small amount of vaginal spotting for a few days after surgery but this has resolved and denies continued vaginal bleeding or abnormal discharge. She was seen  for involuntary bladder emptying requiring pad use. Gentle speculum exam was performed to ensure no signs of vaginal dehiscence and no pooling of peritoneal fluid or vesicovaginal fistula was noted. A small amount of brownish discharge was seen, she was seen 1 week later and noted pain improved and no longer experienced significant loss of urine/fluid. Denies fever, chills, nausea, vomiting, SOB or CP. She is having regular bowel movements and denies constipation/straining.     Today she notes cessation of involuntary loss of urine and denies continued brownish discharge. Does feel increased urinary urgency and uses the restroom every 2-3 hours. She is overall feeling well.     Intra-op findings:  Normal external female genitalia. Long, narrow vaginal vault. Uterus sounded to 14 cm. Laparoscopically, normal bilateral ovaries and fallopian tubes. Moderate adhesive disease from prior  deliveries between the anterior lower uterine segment and bladder. Enlarged, globular uterus with limited mobility. Adhesions from sigmoid colon to left pelvic sidewall. Normal large and small bowel, normal appendix, liver, and stomach. Normal cardiac window. Uterus  removed vaginally using contained vaginal morcellation in Javon bag with ExCITE technique. Incidental vaginal cuff laceration repaired with Endostitch. Cuff closed laparoscopically with Endostitch. Bilateral ureters visualized in their normal anatomic positions with good peristalsis at the beginning and end of the case. Roya powder applied to vaginal cuff along left aspect where a small amount of oozing from posterior peritoneum was noted. Excellent hemostasis at the end of the case from all vascular pedicles. Small incidental vaginal lacerations at the introitus at 8:00 repaired with 2-0 vicryl on CT and small suburethral laceration repaired with 3-0 vicryl on SH respectively. Bella draining clear urine at end of case.      Path:  1. Morcellated uterus, cervix and right fallopian tube weighing 331 g showing:   Inactive endometrium  Negative cervix   Multiple intramural leiomyomas identified in this morcellated specimen.  Some have areas of fibrosis and degenerative changes.  Unremarkable right fallopian tube  2. Unremarkable left fallopian tube    Past Medical History:   Diagnosis Date    Anxiety     Pseudotumor cerebri      Past Surgical History:   Procedure Laterality Date     SECTION      x2    HYSTERECTOMY, TOTAL, LAPAROSCOPIC, WITH SALPINGECTOMY N/A 2025    Procedure: HYSTERECTOMY, TOTAL, LAPAROSCOPIC, W/ SALPINGECTOMY;  Surgeon: Brenda Staples MD;  Location: [facility];  Service: OB/GYN;  Laterality: N/A;    TONSILLECTOMY       Current Medications[1]  Review of patient's allergies indicates:   Allergen Reactions    Pcn [penicillins] Swelling     OB History    Para Term  AB Living   2 2 2   2   SAB IAB Ectopic Multiple Live Births       2      # Outcome Date GA Lbr Casey/2nd Weight Sex Type Anes PTL Lv   2 Term      CS-LTranv      1 Term      CS-LTranv        Social History[2]  Family History   Problem Relation Name Age of Onset    Cancer Father      Diabetes Neg Hx       Eclampsia Neg Hx      Hypertension Neg Hx      Miscarriages / Stillbirths Neg Hx       labor Neg Hx      Stroke Neg Hx         Review of Systems   Negative except as in HPI    Physical Exam   Vitals:    25 1012   BP: 124/84     Body mass index is 48.61 kg/m².    Physical Exam  Constitutional:       General: She is not in acute distress.     Appearance: Normal appearance.     Genitourinary:    Vulva, right adnexa, left adnexa and urethral meatus normal.   The external female genitalia was normal.   No external genitalia lesions identified,Genitalia hair distrobution normal .   Vaginal cuff intact and Sutures intact.  There is vaginal discharge (scant yellowish discharge in vault, likely inflammatory, no evidence of vaginitis, no bleeding, cuff completely intact) in the vagina. No bleeding in the vagina.    No vaginal prolapse present.     No vaginal atrophy present.  Right adnexum displays no mass, no tenderness and no fullness. Left adnexum displays no mass, no tenderness and no fullness. Cervix is absent.Uterus is absent.   HENT:      Head: Normocephalic and atraumatic.   Eyes:      Extraocular Movements: Extraocular movements intact.      Pupils: Pupils are equal, round, and reactive to light.   Pulmonary:      Effort: Pulmonary effort is normal.   Abdominal:      General: Abdomen is flat. There is no distension.      Palpations: Abdomen is soft.      Tenderness: There is no abdominal tenderness. There is no guarding or rebound.      Comments: LSC incisions well-healed   Musculoskeletal:         General: Normal range of motion.      Cervical back: Normal range of motion.   Neurological:      General: No focal deficit present.      Mental Status: She is alert and oriented to person, place, and time.   Skin:     General: Skin is warm and dry.   Psychiatric:         Mood and Affect: Mood normal.         Behavior: Behavior normal.         Thought Content: Thought content normal.   Vitals reviewed.        ASSESSMENT:   Problem List[3]    PLAN:  Problem List Items Addressed This Visit          Renal/    s/p TLH/BS       Other    Postoperative visit - Primary    Current Assessment & Plan   Doing well postop. Cuff intact visibly and on palpation. Pelvic rest until 8 weeks postop (after 8/15). Okay to soak in the tub after this Friday. Return to work letter given. Path benign. Patient to reach out with any continued urinary issues, would consider referral to PFPT and/or Urogyn.              Follow up if symptoms worsen or fail to improve.       Brenda Staples MD  Department of Obstetrics & Gynecology  [facility]           [1]   Current Outpatient Medications:     docusate sodium (COLACE) 100 MG capsule, Take 1 capsule (100 mg total) by mouth 2 (two) times daily., Disp: 120 capsule, Rfl: 0  [2]   Social History  Tobacco Use    Smoking status: Never    Smokeless tobacco: Never   Vaping Use    Vaping status: Former   Substance Use Topics    Alcohol use: Yes     Comment: occas    Drug use: Never   [3]   Patient Active Problem List  Diagnosis    Right optic neuritis    Papilledema    Empty sella    Symptomatic anemia    Abnormal uterine bleeding (AUB)    Morbid obesity with BMI of 45.0-49.9, adult    IIH (idiopathic intracranial hypertension)    Uterine leiomyoma    s/p TLH/BS    Urinary incontinence    Postoperative visit    Acute cystitis with hematuria

## 2025-07-28 NOTE — ASSESSMENT & PLAN NOTE
Doing well postop. Cuff intact visibly and on palpation. Pelvic rest until 8 weeks postop (after 8/15). Okay to soak in the tub after this Friday. Return to work letter given. Path benign. Patient to reach out with any continued urinary issues, would consider referral to PFPT and/or Urogyn.

## 2025-07-30 ENCOUNTER — TELEPHONE (OUTPATIENT)
Dept: OBSTETRICS AND GYNECOLOGY | Facility: CLINIC | Age: 35
End: 2025-07-30
Payer: COMMERCIAL

## 2025-08-18 PROBLEM — N30.01 ACUTE CYSTITIS WITH HEMATURIA: Status: RESOLVED | Noted: 2025-07-07 | Resolved: 2025-08-18

## (undated) DEVICE — SPONGE LAP 18X18 PREWASHED

## (undated) DEVICE — PACK LAPAROSCOPY BAPTIST

## (undated) DEVICE — SET IRR CYSTO 4 LEAD 90IN

## (undated) DEVICE — SYR 10CC LUER LOCK

## (undated) DEVICE — SOL STRL WATER INJ 1000ML BG

## (undated) DEVICE — SCISSOR 5MMX35CM DIRECT DRIVE

## (undated) DEVICE — NDL INSUFFLATION VERRES 120MM

## (undated) DEVICE — TIP RUMI KOH-EFFICIENT 3.0

## (undated) DEVICE — TOWEL OR DISP STRL BLUE 4/PK

## (undated) DEVICE — NDL HYPO REG 25G X 1 1/2

## (undated) DEVICE — TROCAR KII FIOS 11MM X 100MM

## (undated) DEVICE — GRASPER EPIX LAPSCP 5MM 35CM

## (undated) DEVICE — SOL NORMAL USPCA 0.9%

## (undated) DEVICE — SYS SEE SHARP SCP ANTIFG LNG

## (undated) DEVICE — TROCAR KII FIOS 5MM X 100MM

## (undated) DEVICE — DRAPE STERI LONG

## (undated) DEVICE — SOL IRR SOD CHL .9% POUR

## (undated) DEVICE — SUT VICRYL+ 27 UR-6 VIOL

## (undated) DEVICE — ELECTRODE REM PLYHSV RETURN 9

## (undated) DEVICE — GLOVE SENSICARE PI SURG 6

## (undated) DEVICE — PORT ACCESS 5MM W/120MM

## (undated) DEVICE — SOL POVIDONE SCRUB IODINE 4 OZ

## (undated) DEVICE — SUT 0 8-27IN VICRYL PL CT-1

## (undated) DEVICE — GOWN NONREINF SET-IN SLV XL

## (undated) DEVICE — SUT MCRYL PLUS 4-0 PS2 27IN

## (undated) DEVICE — DRAPE LAPSCP CHOLE 122X102X78

## (undated) DEVICE — SYR 50CC LL

## (undated) DEVICE — JELLY SURGILUBE 5GR

## (undated) DEVICE — SOL POVIDONE PREP IODINE 4 OZ

## (undated) DEVICE — GLOVE SENSICARE PI GRN 6.5

## (undated) DEVICE — IRRIGATOR ENDOSCOPY DISP.

## (undated) DEVICE — SEALER LIGASURE LAP 37CM 5MM

## (undated) DEVICE — RELOAD V-LOC 180 0 8IN/20CM

## (undated) DEVICE — SET TRI-LUMEN FILTERED TUBE

## (undated) DEVICE — KIT WING PAD POSITIONING

## (undated) DEVICE — TIP RUMI GREEN DISPOSABLE6.7MM